# Patient Record
Sex: MALE | Race: WHITE | NOT HISPANIC OR LATINO | Employment: PART TIME | URBAN - METROPOLITAN AREA
[De-identification: names, ages, dates, MRNs, and addresses within clinical notes are randomized per-mention and may not be internally consistent; named-entity substitution may affect disease eponyms.]

---

## 2017-02-24 ENCOUNTER — OFFICE VISIT (OUTPATIENT)
Dept: RETAIL CLINIC | Facility: CLINIC | Age: 72
End: 2017-02-24

## 2017-02-24 DIAGNOSIS — Z02.83 ENCOUNTER FOR DRUG SCREENING: Primary | ICD-10-CM

## 2017-02-24 NOTE — PROGRESS NOTES
Donor presents to clinic for urine drug test. Photo ID and Epassport/Epassport ID number obtained. Consent form signed and dated.     Specimen collected per policy. See [facility] in scanned documents.     Jeri Neely, APRN

## 2017-04-26 ENCOUNTER — OFFICE VISIT (OUTPATIENT)
Dept: RETAIL CLINIC | Facility: CLINIC | Age: 72
End: 2017-04-26

## 2017-04-26 DIAGNOSIS — Z02.83 ENCOUNTER FOR DRUG SCREENING: Primary | ICD-10-CM

## 2017-04-26 NOTE — PROGRESS NOTES
Presents to clinic for urine drug screen. [facility] ID number and picture ID obtained from donor. Consent signed and dated. Urine drug screening performed per policy.       See scanned documents for Custody Control Form.

## 2017-06-16 ENCOUNTER — TRANSCRIBE ORDERS (OUTPATIENT)
Dept: ADMINISTRATIVE | Facility: HOSPITAL | Age: 72
End: 2017-06-16

## 2017-06-16 DIAGNOSIS — Z87.891 PERSONAL HISTORY OF TOBACCO USE, PRESENTING HAZARDS TO HEALTH: Primary | ICD-10-CM

## 2017-06-22 ENCOUNTER — HOSPITAL ENCOUNTER (OUTPATIENT)
Dept: ULTRASOUND IMAGING | Facility: HOSPITAL | Age: 72
Discharge: HOME | End: 2017-06-22
Admitting: NURSE PRACTITIONER

## 2017-06-22 DIAGNOSIS — Z87.891 PERSONAL HISTORY OF TOBACCO USE, PRESENTING HAZARDS TO HEALTH: ICD-10-CM

## 2017-06-22 PROCEDURE — 76706 US ABDL AORTA SCREEN AAA: CPT

## 2018-01-31 ENCOUNTER — TRANSCRIBE ORDERS (OUTPATIENT)
Dept: ADMINISTRATIVE | Facility: HOSPITAL | Age: 73
End: 2018-01-31

## 2018-01-31 ENCOUNTER — HOSPITAL ENCOUNTER (OUTPATIENT)
Dept: GENERAL RADIOLOGY | Facility: HOSPITAL | Age: 73
Discharge: HOME | End: 2018-01-31
Attending: INTERNAL MEDICINE | Admitting: INTERNAL MEDICINE

## 2018-01-31 DIAGNOSIS — M54.16 LUMBAR RADICULOPATHY: ICD-10-CM

## 2018-01-31 DIAGNOSIS — M54.16 LUMBAR RADICULOPATHY: Primary | ICD-10-CM

## 2018-01-31 PROCEDURE — 72110 X-RAY EXAM L-2 SPINE 4/>VWS: CPT

## 2018-03-09 ENCOUNTER — TRANSCRIBE ORDERS (OUTPATIENT)
Dept: ADMINISTRATIVE | Facility: HOSPITAL | Age: 73
End: 2018-03-09

## 2018-03-09 DIAGNOSIS — M54.16 LUMBAR RADICULOPATHY: Primary | ICD-10-CM

## 2018-03-15 ENCOUNTER — HOSPITAL ENCOUNTER (OUTPATIENT)
Dept: MRI IMAGING | Facility: HOSPITAL | Age: 73
Discharge: HOME | End: 2018-03-15
Attending: INTERNAL MEDICINE | Admitting: INTERNAL MEDICINE

## 2018-03-15 DIAGNOSIS — M54.16 LUMBAR RADICULOPATHY: ICD-10-CM

## 2018-03-15 PROCEDURE — 72148 MRI LUMBAR SPINE W/O DYE: CPT

## 2018-03-30 ENCOUNTER — OFFICE VISIT (OUTPATIENT)
Dept: NEUROSURGERY | Facility: CLINIC | Age: 73
End: 2018-03-30

## 2018-03-30 VITALS — WEIGHT: 190 LBS | HEIGHT: 66 IN | BODY MASS INDEX: 30.53 KG/M2 | TEMPERATURE: 97.3 F

## 2018-03-30 DIAGNOSIS — M43.06 LUMBAR SPONDYLOLYSIS: ICD-10-CM

## 2018-03-30 DIAGNOSIS — M48.061 SPINAL STENOSIS OF LUMBAR REGION WITHOUT NEUROGENIC CLAUDICATION: Primary | ICD-10-CM

## 2018-03-30 DIAGNOSIS — M51.36 DEGENERATIVE DISC DISEASE, LUMBAR: ICD-10-CM

## 2018-03-30 PROCEDURE — 99203 OFFICE O/P NEW LOW 30 MIN: CPT | Performed by: NEUROLOGICAL SURGERY

## 2018-03-30 NOTE — PROGRESS NOTES
Patient: Aj Marie  : 1945    Primary Care Provider: Asiya Gardner MD    Requesting Provider: As above        History    Chief Complaint: Low back and leg pain with sensory alteration.    History of Present Illness: Mr. Marie is a 72-year-old gentleman who works for [employer].  He's had some low-grade back difficulties in the past and was told that he had spinal stenosis some 15 years ago.  For 2 months he's had bothersome pain in his back that extended as a burning into his thighs.  His pain is actually better.  He's been doing some physical therapy.  His main complaint is an ongoing numbness in his feet and legs that may be worse by the end of the day.  He does some home exercises.  He gets some pain with protracted walking or standing but that doesn't seem to be particularly limiting.    Review of Systems   Constitutional: Negative for activity change, appetite change, chills, diaphoresis, fatigue, fever and unexpected weight change.   HENT: Positive for hearing loss. Negative for congestion, dental problem, drooling, ear discharge, ear pain, facial swelling, mouth sores, nosebleeds, postnasal drip, rhinorrhea, sinus pressure, sneezing, sore throat, tinnitus, trouble swallowing and voice change.    Eyes: Negative for photophobia, pain, discharge, redness, itching and visual disturbance.   Respiratory: Negative for apnea, cough, choking, chest tightness, shortness of breath, wheezing and stridor.    Cardiovascular: Negative for chest pain, palpitations and leg swelling.   Gastrointestinal: Negative for abdominal distention, abdominal pain, anal bleeding, blood in stool, constipation, diarrhea, nausea, rectal pain and vomiting.   Endocrine: Negative for cold intolerance, heat intolerance, polydipsia, polyphagia and polyuria.   Genitourinary: Negative for decreased urine volume, difficulty urinating, dysuria, enuresis, flank pain, frequency, genital sores, hematuria and urgency.  "  Musculoskeletal: Negative for arthralgias, back pain, gait problem, joint swelling, myalgias, neck pain and neck stiffness.   Skin: Negative for color change, pallor, rash and wound.   Allergic/Immunologic: Negative for environmental allergies, food allergies and immunocompromised state.   Neurological: Positive for numbness. Negative for dizziness, tremors, seizures, syncope, facial asymmetry, speech difficulty, weakness, light-headedness and headaches.   Hematological: Negative for adenopathy. Does not bruise/bleed easily.   Psychiatric/Behavioral: Negative for agitation, behavioral problems, confusion, decreased concentration, dysphoric mood, hallucinations, self-injury, sleep disturbance and suicidal ideas. The patient is not nervous/anxious and is not hyperactive.        The patient's past medical history, past surgical history, family history, and social history have been reviewed at length in the electronic medical record.    Physical Exam:   Temp 97.3 °F (36.3 °C) (Temporal Artery )   Ht 167.6 cm (66\")   Wt 86.2 kg (190 lb)   BMI 30.67 kg/m²   CONSTITUTIONAL: Patient is well-nourished, pleasant and appears stated age.  CV: Heart regular rate and rhythm without murmur, rub, or gallop.  PULMONARY: Lungs are clear to ascultation.  MUSCULOSKELETAL:  Straight leg raising is negative.  Ace's Sign is negative.  ROM in back is normal.  Tenderness in the back to palpation is not observed.  NEUROLOGICAL:  Orientation, memory, attention span, language function, and cognition have been examined and are intact.  Strength is intact in the lower extremities to direct testing.  Muscle tone is normal throughout.  Station and gait are normal.  Sensation is intact to light touch testing throughout.  Deep tendon reflexes are 2+ and symmetrical.  Herman signs are negative.  No clonus is elicited at his ankles.  Coordination is intact.      Medical Decision Making    Data Review:   MRI of the lumbar spine demonstrates " reversal of the normal lumbar lordosis with diffuse degenerative disc disease.  There is moderate stenosis at L3-4 and L4-5.    Diagnosis:   The patient has a mixed syndrome of neurogenic claudication as well as mechanical back pain.  His symptoms are modest at this point.    Treatment Options:   I've recommended ongoing home exercise and symptomatic treatment.  If his symptoms progress then I will be happy to see him in follow-up.  He is quite pleased that I have not recommended surgical intervention.       Diagnosis Plan   1. Spinal stenosis of lumbar region without neurogenic claudication     2. Degenerative disc disease, lumbar     3. Lumbar spondylolysis         Scribed for Spencer Ricketts MD by Falguni Holden CMA on 03/30/2018 at 12:55 PM      I, Dr. Ricketts, personally performed the services described in the documentation, as scribed in my presence, and it is both accurate and complete.

## 2018-10-30 ENCOUNTER — CONSULT (OUTPATIENT)
Dept: CARDIOLOGY | Facility: CLINIC | Age: 73
End: 2018-10-30

## 2018-10-30 VITALS
DIASTOLIC BLOOD PRESSURE: 82 MMHG | BODY MASS INDEX: 31.64 KG/M2 | HEART RATE: 63 BPM | WEIGHT: 201.6 LBS | HEIGHT: 67 IN | SYSTOLIC BLOOD PRESSURE: 128 MMHG

## 2018-10-30 DIAGNOSIS — I44.0 FIRST DEGREE ATRIOVENTRICULAR BLOCK: Chronic | ICD-10-CM

## 2018-10-30 DIAGNOSIS — I10 ESSENTIAL HYPERTENSION: ICD-10-CM

## 2018-10-30 DIAGNOSIS — R07.89 ATYPICAL CHEST PAIN: Primary | ICD-10-CM

## 2018-10-30 DIAGNOSIS — Z91.89 CANDIDATE FOR STATIN THERAPY DUE TO RISK OF FUTURE CARDIOVASCULAR EVENT: ICD-10-CM

## 2018-10-30 PROCEDURE — 93000 ELECTROCARDIOGRAM COMPLETE: CPT | Performed by: INTERNAL MEDICINE

## 2018-10-30 PROCEDURE — 99204 OFFICE O/P NEW MOD 45 MIN: CPT | Performed by: INTERNAL MEDICINE

## 2018-10-30 RX ORDER — TAMSULOSIN HYDROCHLORIDE 0.4 MG/1
CAPSULE ORAL DAILY
COMMUNITY
Start: 2018-09-27 | End: 2021-02-24

## 2018-10-30 NOTE — ASSESSMENT & PLAN NOTE
· Patient has recurrent atypical chest pain symptoms, some of which are pleuritic and chest pain and some of which are chest wall sounding.  · Given patient's cardiovascular risks, I recommend exercise echo for further risk stratification

## 2018-11-08 ENCOUNTER — TRANSCRIBE ORDERS (OUTPATIENT)
Dept: ADMINISTRATIVE | Facility: HOSPITAL | Age: 73
End: 2018-11-08

## 2018-11-08 ENCOUNTER — HOSPITAL ENCOUNTER (OUTPATIENT)
Dept: GENERAL RADIOLOGY | Facility: HOSPITAL | Age: 73
Discharge: HOME | End: 2018-11-08
Attending: INTERNAL MEDICINE | Admitting: INTERNAL MEDICINE

## 2018-11-08 DIAGNOSIS — R06.02 SHORTNESS OF BREATH: Primary | ICD-10-CM

## 2018-11-08 PROCEDURE — 71046 X-RAY EXAM CHEST 2 VIEWS: CPT

## 2018-11-29 ENCOUNTER — HOSPITAL ENCOUNTER (OUTPATIENT)
Dept: CARDIOLOGY | Facility: HOSPITAL | Age: 73
Discharge: HOME | End: 2018-11-29
Attending: INTERNAL MEDICINE | Admitting: INTERNAL MEDICINE

## 2018-11-29 VITALS
DIASTOLIC BLOOD PRESSURE: 78 MMHG | HEIGHT: 66 IN | WEIGHT: 189 LBS | HEART RATE: 70 BPM | BODY MASS INDEX: 30.37 KG/M2 | SYSTOLIC BLOOD PRESSURE: 164 MMHG

## 2018-11-29 DIAGNOSIS — R07.89 ATYPICAL CHEST PAIN: ICD-10-CM

## 2018-11-29 LAB
BH CV ECHO MEAS - AI DEC SLOPE: 120.9 CM/SEC^2
BH CV ECHO MEAS - AI MAX PG: 32.1 MMHG
BH CV ECHO MEAS - AI MAX VEL: 283.4 CM/SEC
BH CV ECHO MEAS - AI P1/2T: 686.8 MSEC
BH CV ECHO MEAS - AO ROOT AREA (BSA CORRECTED): 1.4
BH CV ECHO MEAS - AO ROOT AREA: 6.5 CM^2
BH CV ECHO MEAS - AO ROOT DIAM: 2.9 CM
BH CV ECHO MEAS - BSA(HAYCOCK): 2.1 M^2
BH CV ECHO MEAS - BSA: 2 M^2
BH CV ECHO MEAS - BZI_BMI: 32.4 KILOGRAMS/M^2
BH CV ECHO MEAS - BZI_METRIC_HEIGHT: 167.6 CM
BH CV ECHO MEAS - BZI_METRIC_WEIGHT: 91.2 KG
BH CV ECHO MEAS - EDV(CUBED): 51.2 ML
BH CV ECHO MEAS - EDV(TEICH): 58.6 ML
BH CV ECHO MEAS - EF(CUBED): 75.1 %
BH CV ECHO MEAS - EF(TEICH): 67.9 %
BH CV ECHO MEAS - ESV(CUBED): 12.7 ML
BH CV ECHO MEAS - ESV(TEICH): 18.8 ML
BH CV ECHO MEAS - FS: 37.1 %
BH CV ECHO MEAS - IVS/LVPW: 1
BH CV ECHO MEAS - IVSD: 1.2 CM
BH CV ECHO MEAS - LA DIMENSION: 3.1 CM
BH CV ECHO MEAS - LA/AO: 1.1
BH CV ECHO MEAS - LV MASS(C)D: 156.1 GRAMS
BH CV ECHO MEAS - LV MASS(C)DI: 77.9 GRAMS/M^2
BH CV ECHO MEAS - LVIDD: 3.7 CM
BH CV ECHO MEAS - LVIDS: 2.3 CM
BH CV ECHO MEAS - LVPWD: 1.2 CM
BH CV ECHO MEAS - MV A MAX VEL: 119 CM/SEC
BH CV ECHO MEAS - MV DEC SLOPE: 217.7 CM/SEC^2
BH CV ECHO MEAS - MV E MAX VEL: 59.2 CM/SEC
BH CV ECHO MEAS - MV E/A: 0.5
BH CV ECHO MEAS - MV P1/2T MAX VEL: 73.1 CM/SEC
BH CV ECHO MEAS - MV P1/2T: 98.4 MSEC
BH CV ECHO MEAS - MVA P1/2T LCG: 3 CM^2
BH CV ECHO MEAS - MVA(P1/2T): 2.2 CM^2
BH CV ECHO MEAS - SI(CUBED): 19.2 ML/M^2
BH CV ECHO MEAS - SI(TEICH): 19.9 ML/M^2
BH CV ECHO MEAS - SV(CUBED): 38.5 ML
BH CV ECHO MEAS - SV(TEICH): 39.8 ML
BH CV STRESS BP STAGE 1: NORMAL
BH CV STRESS BP STAGE 2: NORMAL
BH CV STRESS DURATION MIN STAGE 1: 3
BH CV STRESS DURATION MIN STAGE 2: 3
BH CV STRESS DURATION SEC STAGE 1: 0
BH CV STRESS DURATION SEC STAGE 2: 0
BH CV STRESS GRADE STAGE 1: 10
BH CV STRESS GRADE STAGE 2: 12
BH CV STRESS HR STAGE 2: 142
BH CV STRESS METS STAGE 1: 5
BH CV STRESS METS STAGE 2: 7.5
BH CV STRESS PROTOCOL 1: NORMAL
BH CV STRESS RECOVERY BP: NORMAL MMHG
BH CV STRESS RECOVERY HR: 89 BPM
BH CV STRESS SPEED STAGE 1: 1.7
BH CV STRESS SPEED STAGE 2: 2.5
BH CV STRESS STAGE 1: 1
BH CV STRESS STAGE 2: 2
LV EF 2D ECHO EST: 65 %
PERCENT MAX PREDICTED HR: 103.45 %
STRESS BASELINE BP: NORMAL MMHG
STRESS BASELINE HR: 70 BPM
STRESS PERCENT HR: 122 %
STRESS POST ESTIMATED WORKLOAD: 7 METS
STRESS POST EXERCISE DUR MIN: 6 MIN
STRESS POST EXERCISE DUR SEC: 0 SEC
STRESS POST PEAK BP: NORMAL MMHG
STRESS POST PEAK HR: 150 BPM

## 2018-11-29 PROCEDURE — 93350 STRESS TTE ONLY: CPT | Performed by: INTERNAL MEDICINE

## 2018-11-29 PROCEDURE — 93320 DOPPLER ECHO COMPLETE: CPT | Performed by: INTERNAL MEDICINE

## 2018-11-29 PROCEDURE — 93320 DOPPLER ECHO COMPLETE: CPT

## 2018-11-29 PROCEDURE — 25010000002 SULFUR HEXAFLUORIDE MICROSPH 60.7-25 MG RECONSTITUTED SUSPENSION: Performed by: INTERNAL MEDICINE

## 2018-11-29 PROCEDURE — 93017 CV STRESS TEST TRACING ONLY: CPT

## 2018-11-29 PROCEDURE — 93350 STRESS TTE ONLY: CPT

## 2018-11-29 PROCEDURE — 93325 DOPPLER ECHO COLOR FLOW MAPG: CPT

## 2018-11-29 PROCEDURE — 93352 ADMIN ECG CONTRAST AGENT: CPT | Performed by: INTERNAL MEDICINE

## 2018-11-29 PROCEDURE — 93325 DOPPLER ECHO COLOR FLOW MAPG: CPT | Performed by: INTERNAL MEDICINE

## 2018-11-29 PROCEDURE — 93018 CV STRESS TEST I&R ONLY: CPT | Performed by: INTERNAL MEDICINE

## 2018-11-29 RX ORDER — LISINOPRIL 5 MG/1
12.5 TABLET ORAL DAILY
COMMUNITY

## 2018-11-29 RX ADMIN — SULFUR HEXAFLUORIDE 5 ML: KIT at 14:00

## 2019-06-14 ENCOUNTER — TRANSCRIBE ORDERS (OUTPATIENT)
Dept: ADMINISTRATIVE | Facility: HOSPITAL | Age: 74
End: 2019-06-14

## 2019-06-14 DIAGNOSIS — R41.3 MEMORY CHANGE: Primary | ICD-10-CM

## 2019-06-20 ENCOUNTER — HOSPITAL ENCOUNTER (OUTPATIENT)
Dept: MRI IMAGING | Facility: HOSPITAL | Age: 74
Discharge: HOME | End: 2019-06-20
Admitting: INTERNAL MEDICINE

## 2019-06-20 DIAGNOSIS — R41.3 MEMORY CHANGE: ICD-10-CM

## 2019-06-20 PROCEDURE — 70551 MRI BRAIN STEM W/O DYE: CPT

## 2020-07-17 ENCOUNTER — OFFICE VISIT (OUTPATIENT)
Dept: NEUROLOGY | Facility: CLINIC | Age: 75
End: 2020-07-17

## 2020-07-17 VITALS
OXYGEN SATURATION: 98 % | DIASTOLIC BLOOD PRESSURE: 72 MMHG | TEMPERATURE: 97.3 F | HEART RATE: 56 BPM | WEIGHT: 188 LBS | HEIGHT: 67 IN | BODY MASS INDEX: 29.51 KG/M2 | SYSTOLIC BLOOD PRESSURE: 138 MMHG

## 2020-07-17 DIAGNOSIS — G31.84 MILD COGNITIVE IMPAIRMENT: Primary | ICD-10-CM

## 2020-07-17 PROCEDURE — 99204 OFFICE O/P NEW MOD 45 MIN: CPT | Performed by: PSYCHIATRY & NEUROLOGY

## 2020-07-17 RX ORDER — DONEPEZIL HYDROCHLORIDE 5 MG/1
5 TABLET, FILM COATED ORAL DAILY
Qty: 30 TABLET | Refills: 11 | Status: SHIPPED | OUTPATIENT
Start: 2020-07-17 | End: 2021-02-24

## 2020-07-17 NOTE — PROGRESS NOTES
"Subjective     CC: memory loss    History of Present Illness   Aj Marie is a 75 y.o. male who comes to clinic today for evaluation of memory impairment. His family has noted symptoms since about 2019 marked initially by mild forgetfulness. However, his symptoms worsened significantly after a Whipple's procedure for a pancreatic cyst at  in 11/19 marked by a significant delirium. Though he improved initially, he has had a progressive cognitive decline since that time marked by impairments in memory, along with some generally associated confusion and disorientation.  There are not identified modifying factors. There are not definite ADL impairments and he continues to work.    Prior evaluation has included a normal MRI of the brain and screening bloodwork.    I have reviewed and confirmed the past family, social and medical history as accurate on 7/17/20.     Review of Systems   Constitutional: Negative.    Respiratory: Negative.    Cardiovascular: Negative.    Gastrointestinal: Negative.    Genitourinary: Negative.    Musculoskeletal: Negative.    All other systems reviewed and are negative.      Objective   General appearance today is normal.   Peripheral pulses were present and symmetric.   The ophthalmoscopic exam today is unremarkable. The discs and posterior elements are unremarkable.    /72   Pulse 56   Temp 97.3 °F (36.3 °C)   Ht 168.9 cm (66.5\")   Wt 85.3 kg (188 lb)   SpO2 98%   BMI 29.89 kg/m²     Physical Exam   Neurological: He has normal strength. He has a normal Finger-Nose-Finger Test. Gait normal.   Reflex Scores:       Tricep reflexes are 2+ on the right side and 2+ on the left side.       Bicep reflexes are 2+ on the right side and 2+ on the left side.       Brachioradialis reflexes are 2+ on the right side and 2+ on the left side.  Psychiatric: His speech is normal.        Neurologic Exam     Mental Status   Oriented to person.   Oriented to place.   Disoriented to time. "   Registration: recalls 3 of 3 objects. Recall at 5 minutes: recalls 1 of 3 objects. Follows 3 step commands.   Attention: normal.   Speech: speech is normal   Level of consciousness: alert  Knowledge: poor.   Able to name object. Able to read. Able to repeat. Able to write. Normal comprehension.     Cranial Nerves   Cranial nerves II through XII intact.     Motor Exam   Muscle bulk: normal  Overall muscle tone: normal    Strength   Strength 5/5 throughout.     Sensory Exam   Light touch normal.     Gait, Coordination, and Reflexes     Gait  Gait: normal    Coordination   Finger to nose coordination: normal    Reflexes   Right brachioradialis: 2+  Left brachioradialis: 2+  Right biceps: 2+  Left biceps: 2+  Right triceps: 2+  Left triceps: 2+      MMSE=23      Assessment/Plan   Aj was seen today for memory loss.    Diagnoses and all orders for this visit:    Mild cognitive impairment    Other orders  -     donepezil (Aricept) 5 MG tablet; Take 1 tablet by mouth Daily.          DISCUSSION/SUMMARY    Aj Marie comes to clinic today for evaluation of memory impairment. His history and examination, including bedside cognitive testing are most consistent with Mild Cognitive Impairment (MCI) , which was discussed. His course is complicated by his history of delirium making prognostication difficult. I also discussed SLP referral. He will then follow up in 6 months , or sooner if needed.     As part of this visit I reviewed prior lab results, reviewed radiology results, reviewed radiology images and obtained additional history from the family which is incorporated in the HPI. Please see above for additional details.

## 2020-08-04 ENCOUNTER — TELEPHONE (OUTPATIENT)
Dept: NEUROLOGY | Facility: CLINIC | Age: 75
End: 2020-08-04

## 2020-08-04 DIAGNOSIS — G31.84 MILD COGNITIVE IMPAIRMENT: Primary | ICD-10-CM

## 2020-08-04 NOTE — TELEPHONE ENCOUNTER
PT'S WIFE ALPHONSO(RAMIRO) CALLING STATING THAT DR. COOK HAD MENTIONED ABOUT SPEECH THERAPY AT THE LAST APPT 7-17-20 AND PT IS WANTING TO DO IT.  PLEASE CALL HER BACK [phone]

## 2020-08-04 NOTE — TELEPHONE ENCOUNTER
Called and LVM informing pt wife Shwetha that order has been sent and once authorized, will receive a call from Central scheduling. Thanks.

## 2020-08-07 ENCOUNTER — TELEPHONE (OUTPATIENT)
Dept: NEUROLOGY | Facility: CLINIC | Age: 75
End: 2020-08-07

## 2020-08-07 NOTE — TELEPHONE ENCOUNTER
Phone call with wife who requested a call from  for education on his cognitive issues.  And Mrs. 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  Wife expressed a lot of stress in coping with his memory and thinking trouble. Says they both get very frustrated at his confusion, forgetting details, conversations or events (poor short term memory), she noted issues with executive functioning in that he couldn't problem solve through if he took some of his meds, took his night med in morning and didn't understand why he was taking Aricept, thinking it was for his dizziness. Wife is concerned about his driving cars, not that she's observed issues but concerned since memory has changed. She notices that he forgets he's already bathed and will shower twice in a day. She's noticed he is short tempered and irritable usually around noticing his memory issues. They keep a very structured day/week, both work, keep up with Je[employer] witness small groups via zoom, attend Je[employer] witness on sundays, lots of puzzles and games. They are interested in starting SLP/cog rehab which is in process of being scheduled, the techniques learned will be beneficial for them both. I provided her with self care techniques to help with her stress of coping with his memory issues, gave her support groups and educational program from the [employer]. She understood she can call me at any time.

## 2020-08-13 ENCOUNTER — HOSPITAL ENCOUNTER (OUTPATIENT)
Dept: SPEECH THERAPY | Facility: HOSPITAL | Age: 75
Setting detail: THERAPIES SERIES
Discharge: HOME | End: 2020-08-13

## 2020-08-13 DIAGNOSIS — G31.84 MILD COGNITIVE IMPAIRMENT: ICD-10-CM

## 2020-08-13 PROCEDURE — 92523 SPEECH SOUND LANG COMPREHEN: CPT

## 2020-08-13 NOTE — THERAPY EVALUATION
Outpatient Speech Language Pathology   Adult Speech Language Cognitive Initial Evaluation  [facility]     Patient Name: Aj Marie  : 1945  MRN: [id number]  Today's Date: 2020        Visit Date: 2020   Patient Active Problem List   Diagnosis   • First degree atrioventricular block   • OA (osteoarthritis)   • Hearing loss   • Essential hypertension   • Candidate for statin therapy due to risk of future cardiovascular event   • Atypical chest pain   • Mild cognitive impairment        Past Medical History:   Diagnosis Date   • Chest pain     Chest pain/dyspnea: Considering anginal equivalent and his risk factors, we will evaluate with a stress echocardiogram at his convenience and send a letter with the results and further recommendations.   • Dyslipidemia     2015:  Total cholesterol 186, triglycerides 147, HDL 37, .ASCVD 41.9%.   • First degree atrioventricular block     Chronicity unknown at this time, possibly first told 20 years ago.    • Hearing loss    • Hyperlipidemia    • Hypertension    • OA (osteoarthritis)         Past Surgical History:   Procedure Laterality Date   • HERNIA REPAIR     • OTHER SURGICAL HISTORY      Remote skull surgery.   • POLYPECTOMY           Visit Dx:    ICD-10-CM ICD-9-CM   1. Mild cognitive impairment G31.84 331.83           SLP [facility] - 20 1000        Communication Assessment/Intervention    Document Type  evaluation   -HG    Subjective Information  no complaints   -HG    Patient Observations  alert;cooperative;agree to therapy   -HG    Patient/Family Observations  Pt's wife present. Both acknowledge memory deficits.   -HG    Patient Effort  excellent   -HG    Symptoms Noted During/After Treatment  none   -HG       General Information    Patient Profile Reviewed  yes   -HG    Pertinent History Of Current Problem  Pt is a 75 year old male following by Neurologist for Mild Cognitive Impairment. Pt with a recent whipple procedue  in November of 2019 and pt states memory deficits more apparent following surgery.   -HG    Precautions/Limitations, Vision  WFL   -HG    Precautions/Limitations, Hearing  hearing impairment, bilaterally   -HG    Patient Level of Education  3 years college   -HG    Prior Level of Function-Communication  WFL   -HG    Plans/Goals Discussed with  patient;spouse/S.O.   -HG    Barriers to Rehab  none identified   -HG    Patient's Goals for Discharge  functional cognition   -HG    Family Goals for Discharge  functional cognition   -HG    Standardized Assessment Used  RBANS   -HG       Auditory Comprehension Assessment/Intervention    Auditory Comprehension (Communication)  WFL   -HG       Verbal Expression Assessment/Intervention    Verbal Expression  WFL   -HG       Oral Motor Structure and Function    Oral Motor Structure and Function  WFL   -HG       Oral Musculature and Cranial Nerve Assessment    Oral Motor General Assessment  WFL   -HG       Motor Speech Assessment/Intervention    Motor Speech Function  WFL   -HG       Cursory Voice Assessment/Intervention    Quality and Resonance (Voice)  WFL   -HG       Cognitive Assessment Intervention- SLP    Cognitive Function (Cognition)  moderate impairment   -HG    Orientation Status (Cognition)  WFL   -HG    Memory (Cognitive)  simple;immediate;moderate impairment;short-term;delayed;unrelated;severe impairment   -HG    Attention (Cognitive)  sustained;alternating;mild impairment;moderate impairment   -HG    Thought Organization (Cognitive)  concrete divergent;WFL   -HG    Reasoning (Cognitive)  simple;mod-complex;mild impairment   -HG    Problem Solving (Cognitive)  simple;divergent;multifactorial;moderate impairment   -HG       RBANS- Repeatable Battery for the Assessment of Neuropsychological Status    Immediate Memory Index Score  73   -HG    Immediate Memory Percentile  4 %   -HG    Immediate Memory Qualitative Description  borderline   -HG    Visuospatial Index Score   89   -HG    Visuospatial Percentile  23 %   -HG    Visuospatial Qualitative Description  low average   -HG    Language Index Score  96   -HG    Language Percentile  39 %   -HG    Language Qualitative Description  average   -HG    Attention Index Score  88   -HG    Attention Percentile  21 %   -HG    Attention Qualitative Description  low average   -HG    Delayed Memory Index Score  60   -HG    Delayed Memory Percentile  0 %   -HG    Delayed Memory Qualitative Description  extremely low   -HG    Total Index Score  406   -HG    Total Percentile  6 %   -HG    Total Qualitative Description  borderline   -HG      User Key  (r) = Recorded By, (t) = Taken By, (c) = Cosigned By    Initials Name Provider Type    Candy Bello MS CCC-SLP Speech and Language Pathologist                         OP SLP Education     Row Name 08/13/20 1000       Education    Barriers to Learning  No barriers identified  -HG    Education Provided  Family/caregivers demonstrated recommended strategies;Patient demonstrated recommended strategies;Family/caregivers require further education on strategies, risks;Patient requires further education on strategies, risks  -    Assessed  Learning needs;Learning motivation;Learning preferences;Learning readiness  -    Learning Motivation  Strong  -    Learning Method  Explanation;Demonstration;Teach back;Written materials  -    Teaching Response  Verbalized understanding;Demonstrated understanding;Reinforcement needed  -    Education Comments  Education initiated for internal and external memory strategies. Pt provided a handout and it was reviewed by SLP.   -HG      User Key  (r) = Recorded By, (t) = Taken By, (c) = Cosigned By    Initials Name Effective Dates    Candy Bello MS CCC-SLP 08/09/20 -           SLP OP Goals     Row Name 08/13/20 1000          Goal Type Needed    Goal Type Needed  Memory;Attention/Orientation;Other Adult Goals  -HG        Memory Goals    Patient will  be able to remember information needed to return to work and function on work-related tasks  90%:;with intermittent cues  -HG     Status: Patient will be able to remember information needed to return to work and function on work-related tasks  New  -HG     Patient will demonstrate improved ability to recall information by immediately recalling a series of words  80%:;related;after delay;with cues  -HG     Status: Patient will demonstrate improved ability to recall information by immediately recalling a series of words  New  -HG     Patient’s memory skills will be enhanced as reported by patient by utilizing internal memory strategies to recall up to 3 pieces of information after a 5- minute delay  80%:;with cues  -HG     Status: Patient’s memory skills will be enhanced as reported by patient by utilizing internal memory strategies to recall up to 3 pieces of information after a 5- minute delay  New  -HG     Patient’s memory skills will be enhanced as reported by patient by using external memory aides  80%:;with cues  -HG     Status: Patient’s memory skills will be enhanced as reported by patient by using external memory aides  New  -HG     Patient will demonstrate improved ability to recall information by stating activities patient has completed that day  80%:;with cues  -HG     Status: Patient will demonstrate improved ability to recall information by stating activities patient has completed that day  New  -HG        Attention/Orientation Goals    Patient will be able to execute all activities necessary to manage a home  Independently;With Cues  -HG     Status: Patient will be able to execute all activities necessary to manage a home  New  -HG     Patient will improve attention skills by sustaining consistent behavioral response during continuous and repetitive activity (e.g., listening for target words, auditory/reading comprehension tasks)  with cues;10 minute task  -HG     Status: Patient will improve attention  skills by sustaining consistent behavioral response during continuous and repetitive activity (e.g., listening for target words, auditory/reading comprehension tasks)  New  -HG     Patient will improve attention skills by sustaining focus in order to actively hold and manipulate information provided (e.g., sequencing auditorily presented number series in ascending or descending order)  80%:;with cues  -HG     Status: Patient will improve attention skills by sustaining focus in order to actively hold and manipulate information provided (e.g., sequencing auditorily presented number series in ascending or descending order)  New  -HG     Patient will improve attention skills by alternating or shifting focus between two different tasks in order to complete both tasks  80%:;with cues  -HG     Status: Patient will improve attention skills by alternating or shifting focus between two different tasks in order to complete both tasks  New  -HG     Patient will improve attention skills by dividing focus and responding simultaneously to multiple tasks or in order to complete task  80%:;with cues  -HG     Status: Patient will improve attention skills by dividing focus and responding simultaneously to multiple tasks or in order to complete task  New  -HG     Patient will improve attention skills by sustaining focus to high-level cognitive tasks in order to complete task  80%:;with cues  -HG     Status: Patient will improve attention skills by sustaining focus to high-level cognitive tasks in order to complete task  New  -HG        Other Goals    Other Adult Goal- 1  Pt will improve RBANS Total to at least an 85 placing pt in the Low Average range.   -HG     Status: Other Adult Goal- 1  New  -HG        SLP Time Calculation    SLP Goal Re-Cert Due Date  09/12/20  -HG       User Key  (r) = Recorded By, (t) = Taken By, (c) = Cosigned By    Initials Name Provider Type    Candy Bello MS CCC-SLP Speech and Language Pathologist           OP SLP Assessment/Plan - 08/13/20 1000        SLP Assessment    Functional Problems  Speech Language- Adult/Cognition   -HG    Impact on Function: Adult Speech Language/Cognition  Trouble learning or remembering new information;Requires supervision   -HG    Clinical Impression: Speech Language-Adult/Congnition  Moderate:;Cognitive Communication Impairment   -HG    Functional Problems Comment  Pt reports having the have information repeated to him over and over as well as 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    -HG    Clinical Impression Comments  RBANS Total Score of 77 places pt in the Borderline range.    -HG    Please refer to paper survey for additional self-reported information  Yes   -HG    Please refer to items scanned into chart for additional diagnostic informaiton and handouts as provided by clinician  Yes   -HG    SLP Diagnosis  Moderate Cognitive Communication impairment    -HG    Prognosis  Excellent (comment)   -HG    Patient/caregiver participated in establishment of treatment plan and goals  Yes   -HG    Patient would benefit from skilled therapy intervention  Yes   -HG       SLP Plan    Frequency  1-2x/week   -HG    Duration  12 weeks   -HG    Planned CPT's?  SLP SPEECH & LANGUAGE EVAL: 82280;SLP INDIVIDUAL SPEECH THERAPY: 29728   -HG    Expected Duration Therapy Session - minutes  45-60 minutes   -HG    Plan Comments  Initiate Cog tx.    -HG      User Key  (r) = Recorded By, (t) = Taken By, (c) = Cosigned By    Initials Name Provider Type     Candy Rubin, MS CCC-SLP Speech and Language Pathologist                 Time Calculation:   SLP Start Time: 1000    Therapy Charges for Today     Code Description Service Date Service Provider Modifiers Qty    [id number]  ST EVAL SPEECH AND PROD W LANG  4 8/13/2020 Candy Rubin MS CCC-SLP GN 1                   Candy Rubin MS CCC-SLP  8/13/2020

## 2020-08-27 ENCOUNTER — HOSPITAL ENCOUNTER (OUTPATIENT)
Dept: SPEECH THERAPY | Facility: HOSPITAL | Age: 75
Setting detail: THERAPIES SERIES
Discharge: HOME | End: 2020-08-27

## 2020-08-27 DIAGNOSIS — G31.84 MILD COGNITIVE IMPAIRMENT: Primary | ICD-10-CM

## 2020-08-27 PROCEDURE — 92507 TX SP LANG VOICE COMM INDIV: CPT

## 2020-08-27 NOTE — THERAPY TREATMENT NOTE
Outpatient Speech Language Pathology   Adult Speech Language Cognitive Treatment Note  [facility]     Patient Name: Aj Marie  : 1945  MRN: [id number]  Today's Date: 2020         Visit Date: 2020   Patient Active Problem List   Diagnosis   • First degree atrioventricular block   • OA (osteoarthritis)   • Hearing loss   • Essential hypertension   • Candidate for statin therapy due to risk of future cardiovascular event   • Atypical chest pain   • Mild cognitive impairment          Visit Dx:    ICD-10-CM ICD-9-CM   1. Mild cognitive impairment G31.84 331.83                         SLP OP Goals     Row Name 20 0900          Goal Type Needed    Goal Type Needed  Memory;Attention/Orientation;Other Adult Goals  -HG        Subjective Comments    Subjective Comments  Pt alert, cooperative, did not return with journal.   -HG        Memory Goals    Patient will be able to remember information needed to return to work and function on work-related tasks  90%:;with intermittent cues  -HG     Status: Patient will be able to remember information needed to return to work and function on work-related tasks  New  -HG     Patient will demonstrate improved ability to recall information by immediately recalling a series of words  80%:;related;after delay;with cues  -HG     Status: Patient will demonstrate improved ability to recall information by immediately recalling a series of words  Progressing as expected  -HG     Comments: Patient will demonstrate improved ability to recall information by immediately recalling a series of words  20: 4 related words and pt was 1/4 and improved to 3/4 and 4/4 with mneumonic device.   -HG     Patient’s memory skills will be enhanced as reported by patient by utilizing internal memory strategies to recall up to 3 pieces of information after a 5- minute delay  80%:;with cues  -HG     Status: Patient’s memory skills will be enhanced as reported by patient by  "utilizing internal memory strategies to recall up to 3 pieces of information after a 5- minute delay  New  -HG     Patient’s memory skills will be enhanced as reported by patient by using external memory aides  80%:;with cues  -HG     Status: Patient’s memory skills will be enhanced as reported by patient by using external memory aides  Progressing as expected  -HG     Comments: Patient’s memory skills will be enhanced as reported by patient by using external memory aides  8/27/20: Education for use of journal and the need for his delayed recall.   -HG     Patient will demonstrate improved ability to recall information by stating activities patient has completed that day  80%:;with cues  -HG     Status: Patient will demonstrate improved ability to recall information by stating activities patient has completed that day  Progressing as expected  -HG     Comments: Patient will demonstrate improved ability to recall information by stating activities patient has completed that day  8/27/20: Education for recall of what he has done throughout the day. Pt states that he never really worried about these things, \"That's why I had 2 secretaries.\"   -HG        Attention/Orientation Goals    Patient will be able to execute all activities necessary to manage a home  Independently;With Cues  -HG     Status: Patient will be able to execute all activities necessary to manage a home  New  -HG     Patient will improve attention skills by sustaining consistent behavioral response during continuous and repetitive activity (e.g., listening for target words, auditory/reading comprehension tasks)  with cues;10 minute task  -HG     Status: Patient will improve attention skills by sustaining consistent behavioral response during continuous and repetitive activity (e.g., listening for target words, auditory/reading comprehension tasks)  New  -HG     Patient will improve attention skills by sustaining focus in order to actively hold and " manipulate information provided (e.g., sequencing auditorily presented number series in ascending or descending order)  80%:;with cues  -HG     Status: Patient will improve attention skills by sustaining focus in order to actively hold and manipulate information provided (e.g., sequencing auditorily presented number series in ascending or descending order)  New  -HG     Patient will improve attention skills by alternating or shifting focus between two different tasks in order to complete both tasks  80%:;with cues  -HG     Status: Patient will improve attention skills by alternating or shifting focus between two different tasks in order to complete both tasks  New  -HG     Patient will improve attention skills by dividing focus and responding simultaneously to multiple tasks or in order to complete task  80%:;with cues  -HG     Status: Patient will improve attention skills by dividing focus and responding simultaneously to multiple tasks or in order to complete task  New  -HG     Patient will improve attention skills by sustaining focus to high-level cognitive tasks in order to complete task  80%:;with cues  -HG     Status: Patient will improve attention skills by sustaining focus to high-level cognitive tasks in order to complete task  Progressing as expected  -HG     Comments: Patient will improve attention skills by sustaining focus to high-level cognitive tasks in order to complete task  8/27/20: One Pile Card Game: Pt was 75% accurate.   -HG        Other Goals    Other Adult Goal- 1  Pt will improve RBANS Total to at least an 85 placing pt in the Low Average range.   -HG     Status: Other Adult Goal- 1  New  -HG        SLP Time Calculation    SLP Goal Re-Cert Due Date  09/12/20  -HG       User Key  (r) = Recorded By, (t) = Taken By, (c) = Cosigned By    Initials Name Provider Type    Candy Bello MS CCC-SLP Speech and Language Pathologist          OP SLP Education     Row Name 08/27/20 0900        Education    Education Comments  Education and written instructions for use of a daily journal or planner.   -      User Key  (r) = Recorded By, (t) = Taken By, (c) = Cosigned By    Initials Name Effective Dates    Candy Bello MS CCC-SLP 08/09/20 -           OP SLP Assessment/Plan - 08/27/20 0900        SLP Plan    Plan Comments  Cont with Cog tx.    -      User Key  (r) = Recorded By, (t) = Taken By, (c) = Cosigned By    Initials Name Provider Type    Candy Bello MS CCC-SLP Speech and Language Pathologist                 Time Calculation:   SLP Start Time: 0900    Therapy Charges for Today     Code Description Service Date Service Provider Modifiers Qty    28059301041 HC ST TREATMENT SPEECH 4 8/27/2020 Candy Rubin MS CCC-SLP GN 1                   Candy Rubin MS CCC-ROZINA  8/27/2020

## 2020-09-03 ENCOUNTER — HOSPITAL ENCOUNTER (OUTPATIENT)
Dept: SPEECH THERAPY | Facility: HOSPITAL | Age: 75
Setting detail: THERAPIES SERIES
Discharge: HOME | End: 2020-09-03

## 2020-09-03 DIAGNOSIS — G31.84 MILD COGNITIVE IMPAIRMENT: Primary | ICD-10-CM

## 2020-09-03 PROCEDURE — 92507 TX SP LANG VOICE COMM INDIV: CPT

## 2020-09-03 NOTE — THERAPY TREATMENT NOTE
Outpatient Speech Language Pathology   Adult Speech Language Cognitive Treatment Note  [facility]     Patient Name: Aj Marie  : 1945  MRN: [id number]  Today's Date: 9/3/2020         Visit Date: 2020   Patient Active Problem List   Diagnosis   • First degree atrioventricular block   • OA (osteoarthritis)   • Hearing loss   • Essential hypertension   • Candidate for statin therapy due to risk of future cardiovascular event   • Atypical chest pain   • Mild cognitive impairment          Visit Dx:    ICD-10-CM ICD-9-CM   1. Mild cognitive impairment G31.84 331.83                         SLP OP Goals     Row Name 20 0900          Goal Type Needed    Goal Type Needed  Memory;Attention/Orientation;Other Adult Goals  -HG        Subjective Comments    Subjective Comments  Pt alert, cooperative, returned with homework.   -HG        Memory Goals    Patient will be able to remember information needed to return to work and function on work-related tasks  90%:;with intermittent cues  -HG     Status: Patient will be able to remember information needed to return to work and function on work-related tasks  New  -HG     Patient will demonstrate improved ability to recall information by immediately recalling a series of words  80%:;related;after delay;with cues  -HG     Status: Patient will demonstrate improved ability to recall information by immediately recalling a series of words  Progressing as expected  -HG     Comments: Patient will demonstrate improved ability to recall information by immediately recalling a series of words  9/3/20: 4 related words from homework: pt was 2/4 I'ly. Moved to un-related pictures for visual ease and pt was 2/4. 20: 4 related words and pt was 1/4 and improved to 3/4 and 4/4 with mneumonic device.   -HG     Patient’s memory skills will be enhanced as reported by patient by utilizing internal memory strategies to recall up to 3 pieces of information after a 5- minute  "delay  80%:;with cues  -HG     Status: Patient’s memory skills will be enhanced as reported by patient by utilizing internal memory strategies to recall up to 3 pieces of information after a 5- minute delay  New  -HG     Patient’s memory skills will be enhanced as reported by patient by using external memory aides  80%:;with cues  -HG     Status: Patient’s memory skills will be enhanced as reported by patient by using external memory aides  Progressing as expected  -HG     Comments: Patient’s memory skills will be enhanced as reported by patient by using external memory aides  9/3/20: Pt states that he journaling \"a little\". 8/27/20: Education for use of journal and the need for his delayed recall.   -HG     Patient will demonstrate improved ability to recall information by stating activities patient has completed that day  80%:;with cues  -HG     Status: Patient will demonstrate improved ability to recall information by stating activities patient has completed that day  Progressing as expected  -HG     Comments: Patient will demonstrate improved ability to recall information by stating activities patient has completed that day  8/27/20: Education for recall of what he has done throughout the day. Pt states that he never really worried about these things, \"That's why I had 2 secretaries.\"   -HG        Attention/Orientation Goals    Patient will be able to execute all activities necessary to manage a home  Independently;With Cues  -HG     Status: Patient will be able to execute all activities necessary to manage a home  New  -HG     Patient will improve attention skills by sustaining consistent behavioral response during continuous and repetitive activity (e.g., listening for target words, auditory/reading comprehension tasks)  with cues;10 minute task  -HG     Status: Patient will improve attention skills by sustaining consistent behavioral response during continuous and repetitive activity (e.g., listening for " target words, auditory/reading comprehension tasks)  New  -HG     Patient will improve attention skills by sustaining focus in order to actively hold and manipulate information provided (e.g., sequencing auditorily presented number series in ascending or descending order)  80%:;with cues  -HG     Status: Patient will improve attention skills by sustaining focus in order to actively hold and manipulate information provided (e.g., sequencing auditorily presented number series in ascending or descending order)  Progressing as expected  -HG     Comments: Patient will improve attention skills by sustaining focus in order to actively hold and manipulate information provided (e.g., sequencing auditorily presented number series in ascending or descending order)  9/3/20: Digit Span: pt was able to recall up to 6 digits.   -HG     Patient will improve attention skills by alternating or shifting focus between two different tasks in order to complete both tasks  80%:;with cues  -HG     Status: Patient will improve attention skills by alternating or shifting focus between two different tasks in order to complete both tasks  Progressing as expected  -HG     Comments: Patient will improve attention skills by alternating or shifting focus between two different tasks in order to complete both tasks  9/3/20: Alternating attention for cards with N and sorting into suit and pt was 50% accurate.   -HG     Patient will improve attention skills by dividing focus and responding simultaneously to multiple tasks or in order to complete task  80%:;with cues  -HG     Status: Patient will improve attention skills by dividing focus and responding simultaneously to multiple tasks or in order to complete task  New  -HG     Patient will improve attention skills by sustaining focus to high-level cognitive tasks in order to complete task  80%:;with cues  -HG     Status: Patient will improve attention skills by sustaining focus to high-level cognitive  tasks in order to complete task  Progressing as expected  -HG     Comments: Patient will improve attention skills by sustaining focus to high-level cognitive tasks in order to complete task  8/27/20: One Pile Card Game: Pt was 75% accurate.   -HG        Other Goals    Other Adult Goal- 1  Pt will improve RBANS Total to at least an 85 placing pt in the Low Average range.   -HG     Status: Other Adult Goal- 1  New  -HG        SLP Time Calculation    SLP Goal Re-Cert Due Date  09/12/20  -HG       User Key  (r) = Recorded By, (t) = Taken By, (c) = Cosigned By    Initials Name Provider Type    Candy Bello MS CCC-SLP Speech and Language Pathologist          OP SLP Education     Row Name 09/03/20 0900       Education    Education Comments  Hmwk for delayed recall and journaling.   -HG      User Key  (r) = Recorded By, (t) = Taken By, (c) = Cosigned By    Initials Name Effective Dates    HG Candy Rubin MS CCC-SLP 08/09/20 -           OP SLP Assessment/Plan - 09/03/20 0900        SLP Plan    Plan Comments  Cont with Cog tx.    -HG      User Key  (r) = Recorded By, (t) = Taken By, (c) = Cosigned By    Initials Name Provider Type    Candy Bello MS CCC-SLP Speech and Language Pathologist                 Time Calculation:   SLP Start Time: 0900    Therapy Charges for Today     Code Description Service Date Service Provider Modifiers Qty    [id number]  ST TREATMENT SPEECH 4 9/3/2020 Candy Rubin MS CCC-SLP GN 1                   Candy Rubin MS CCC-SLP  9/3/2020

## 2020-09-10 ENCOUNTER — TELEPHONE (OUTPATIENT)
Dept: NEUROLOGY | Facility: CLINIC | Age: 75
End: 2020-09-10

## 2020-09-10 ENCOUNTER — HOSPITAL ENCOUNTER (OUTPATIENT)
Dept: SPEECH THERAPY | Facility: HOSPITAL | Age: 75
Setting detail: THERAPIES SERIES
Discharge: HOME | End: 2020-09-10

## 2020-09-10 DIAGNOSIS — G31.84 MILD COGNITIVE IMPAIRMENT: Primary | ICD-10-CM

## 2020-09-10 PROCEDURE — 92507 TX SP LANG VOICE COMM INDIV: CPT

## 2020-09-10 NOTE — TELEPHONE ENCOUNTER
Phone call with Mrs. Marie who wanted to discuss new/worsening cognitive concerns. She has noticed more forgetfulness, especially as it relates to the pandemic, not remembering mask, etc. He is still working for [employer] car and boss called her about concerns of needing more assistance and reminders while at work. He got lost while in [place] recently so they are limiting his driving to [place]. Wife feels he safe around town but will jump in car with him to reduce chance of him becoming disoriented. We discussed setting up his GPS, find my phone on iphone, and then we can discuss at next appt (or sooner if needed) [place] driving evaluation or the need to discuss giving up driving should more concerns arise. She asked about role of nutrition which we dicussed mediterranean and MIND diet, I offered to refer her to [facility] [place] Nutrition Education dept if needed. She will attend his next SLP session for more ideas on how to best help and communicate with him and we, too, will work with SLP on reassessing his cognitive abilities and related support needs. I again referred her to [employer] support groups.

## 2020-09-10 NOTE — THERAPY PROGRESS REPORT/RE-CERT
Outpatient Speech Language Pathology   Adult Speech Language Cognitive Progress Note  [facility]     Patient Name: Aj Marie  : 1945  MRN: [id number]  Today's Date: 9/10/2020         Visit Date: 09/10/2020   Patient Active Problem List   Diagnosis   • First degree atrioventricular block   • OA (osteoarthritis)   • Hearing loss   • Essential hypertension   • Candidate for statin therapy due to risk of future cardiovascular event   • Atypical chest pain   • Mild cognitive impairment          Visit Dx:    ICD-10-CM ICD-9-CM   1. Mild cognitive impairment G31.84 331.83                         SLP OP Goals     Row Name 09/10/20 0800          Goal Type Needed    Goal Type Needed  Memory;Attention/Orientation;Other Adult Goals  -HG        Subjective Comments    Subjective Comments  Pt alert, cooperative, returned with no notebook or homework.   -HG        Memory Goals    Patient will be able to remember information needed to return to work and function on work-related tasks  90%:;with intermittent cues  -HG     Status: Patient will be able to remember information needed to return to work and function on work-related tasks  New  -HG     Patient will demonstrate improved ability to recall information by immediately recalling a series of words  80%:;related;after delay;with cues  -HG     Status: Patient will demonstrate improved ability to recall information by immediately recalling a series of words  Progressing as expected  -HG     Comments: Patient will demonstrate improved ability to recall information by immediately recalling a series of words  :9/3/20: 4 related words from homework: pt was 2/4 I'ly. Moved to un-related pictures for visual ease and pt was 2/4. 20: 4 related words and pt was 1/4 and improved to 3/4 and 4/4 with mneumonic device.   -HG     Patient’s memory skills will be enhanced as reported by patient by utilizing internal memory strategies to recall up to 3 pieces of information  "after a 5- minute delay  80%:;with cues  -HG     Status: Patient’s memory skills will be enhanced as reported by patient by utilizing internal memory strategies to recall up to 3 pieces of information after a 5- minute delay  Progressing as expected  -HG     Comments: Patient’s memory skills will be enhanced as reported by patient by utilizing internal memory strategies to recall up to 3 pieces of information after a 5- minute delay  9/10/20: 4 un-related pictures from homework: pt was 3/4.  2/4- moved to 4 related pictures and pt was 0/4 I'ly. With cues, pt was 3/4.   -HG     Patient’s memory skills will be enhanced as reported by patient by using external memory aides  80%:;with cues  -HG     Status: Patient’s memory skills will be enhanced as reported by patient by using external memory aides  Progressing as expected  -HG     Comments: Patient’s memory skills will be enhanced as reported by patient by using external memory aides  9/10/20: Pt stated that he has written in his journal a little. Provided visual prompts to remind pt to write in his journal. 9/3/20: Pt states that he journaling \"a little\". 8/27/20: Education for use of journal and the need for his delayed recall.   -HG     Patient will demonstrate improved ability to recall information by stating activities patient has completed that day  80%:;with cues  -HG     Status: Patient will demonstrate improved ability to recall information by stating activities patient has completed that day  Progressing as expected  -HG     Comments: Patient will demonstrate improved ability to recall information by stating activities patient has completed that day  8/27/20: Education for recall of what he has done throughout the day. Pt states that he never really worried about these things, \"That's why I had 2 secretaries.\"   -HG        Attention/Orientation Goals    Patient will be able to execute all activities necessary to manage a home  Independently;With Cues  -HG  "    Status: Patient will be able to execute all activities necessary to manage a home  New  -HG     Patient will improve attention skills by sustaining consistent behavioral response during continuous and repetitive activity (e.g., listening for target words, auditory/reading comprehension tasks)  with cues;10 minute task  -HG     Status: Patient will improve attention skills by sustaining consistent behavioral response during continuous and repetitive activity (e.g., listening for target words, auditory/reading comprehension tasks)  Progressing as expected  -HG     Comments: Patient will improve attention skills by sustaining consistent behavioral response during continuous and repetitive activity (e.g., listening for target words, auditory/reading comprehension tasks)  9/10/20: Card ID for a card that is one less than one before it:   -HG     Patient will improve attention skills by sustaining focus in order to actively hold and manipulate information provided (e.g., sequencing auditorily presented number series in ascending or descending order)  80%:;with cues  -HG     Status: Patient will improve attention skills by sustaining focus in order to actively hold and manipulate information provided (e.g., sequencing auditorily presented number series in ascending or descending order)  Progressing as expected  -HG     Comments: Patient will improve attention skills by sustaining focus in order to actively hold and manipulate information provided (e.g., sequencing auditorily presented number series in ascending or descending order)  9/3/20: Digit Span: pt was able to recall up to 6 digits.   -HG     Patient will improve attention skills by alternating or shifting focus between two different tasks in order to complete both tasks  80%:;with cues  -HG     Status: Patient will improve attention skills by alternating or shifting focus between two different tasks in order to complete both tasks  Progressing as expected  -HG      Comments: Patient will improve attention skills by alternating or shifting focus between two different tasks in order to complete both tasks  9/3/20: Alternating attention for cards with N and sorting into suit and pt was 50% accurate.   -HG     Patient will improve attention skills by dividing focus and responding simultaneously to multiple tasks or in order to complete task  80%:;with cues  -HG     Status: Patient will improve attention skills by dividing focus and responding simultaneously to multiple tasks or in order to complete task  Progressing as expected  -HG     Comments: Patient will improve attention skills by dividing focus and responding simultaneously to multiple tasks or in order to complete task  9/10/20: Divided attention for card sorting according to suit and specific date: pt was 90% accurate.   -HG     Patient will improve attention skills by sustaining focus to high-level cognitive tasks in order to complete task  80%:;with cues  -HG     Status: Patient will improve attention skills by sustaining focus to high-level cognitive tasks in order to complete task  Progressing as expected  -HG     Comments: Patient will improve attention skills by sustaining focus to high-level cognitive tasks in order to complete task  8/27/20: One Pile Card Game: Pt was 75% accurate.   -HG        Other Goals    Other Adult Goal- 1  Pt will improve RBANS Total to at least an 85 placing pt in the Low Average range.   -HG     Status: Other Adult Goal- 1  Progressing as expected  -HG     Comments: Other Adult Goal- 1  9/10/20: RBANS not re-administered this date, pt given SLUMS and scored a 19/30.   -HG        SLP Time Calculation    SLP Goal Re-Cert Due Date  10/10/20  -HG       User Key  (r) = Recorded By, (t) = Taken By, (c) = Cosigned By    Initials Name Provider Type    Candy Bello MS CCC-SLP Speech and Language Pathologist          OP SLP Education     Row Name 09/10/20 0800       Education     Education Comments  Hmwk for delayed recall of 4 related pictures and to journal.   -HG      User Key  (r) = Recorded By, (t) = Taken By, (c) = Cosigned By    Initials Name Effective Dates    Candy Bello MS CCC-SLP 08/09/20 -           OP SLP Assessment/Plan - 09/10/20 0800        SLP Assessment    Functional Problems  Speech Language- Adult/Cognition   -HG    Clinical Impression Comments  RBANS not re-administered due to only 4 sessions in but SLUMS was given at a 19/30 falling in the dementia range.   -HG    Please refer to paper survey for additional self-reported information  Yes   -HG    Please refer to items scanned into chart for additional diagnostic informaiton and handouts as provided by clinician  Yes   -HG    SLP Diagnosis  Moderate cognitive impairment   -HG    Prognosis  Excellent (comment)   -HG       SLP Plan    Plan Comments  Cont with Cog tx.    -HG      User Key  (r) = Recorded By, (t) = Taken By, (c) = Cosigned By    Initials Name Provider Type    Candy Bello MS CCC-SLP Speech and Language Pathologist                 Time Calculation:   SLP Start Time: 0800    Therapy Charges for Today     Code Description Service Date Service Provider Modifiers Qty    [id number]  ST TREATMENT SPEECH 4 9/10/2020 Candy Rubin MS CCC-SLP GN 1                   Candy Rubin MS CCC-SLP  9/10/2020

## 2020-09-17 ENCOUNTER — HOSPITAL ENCOUNTER (OUTPATIENT)
Dept: SPEECH THERAPY | Facility: HOSPITAL | Age: 75
Setting detail: THERAPIES SERIES
Discharge: HOME | End: 2020-09-17

## 2020-09-17 DIAGNOSIS — G31.84 MILD COGNITIVE IMPAIRMENT: Primary | ICD-10-CM

## 2020-09-17 PROCEDURE — 92507 TX SP LANG VOICE COMM INDIV: CPT

## 2020-09-17 NOTE — THERAPY TREATMENT NOTE
Outpatient Speech Language Pathology   Adult Speech Language Cognitive Treatment Note  [facility]     Patient Name: Aj Marie  : 1945  MRN: [id number]  Today's Date: 2020         Visit Date: 2020   Patient Active Problem List   Diagnosis   • First degree atrioventricular block   • OA (osteoarthritis)   • Hearing loss   • Essential hypertension   • Candidate for statin therapy due to risk of future cardiovascular event   • Atypical chest pain   • Mild cognitive impairment          Visit Dx:    ICD-10-CM ICD-9-CM   1. Mild cognitive impairment  G31.84 331.83                         SLP OP Goals     Row Name 20 0800          Goal Type Needed    Goal Type Needed  Memory;Attention/Orientation;Other Adult Goals  -HG        Subjective Comments    Subjective Comments  Pt alert, cooperative, returned with journal.   -HG        Memory Goals    Patient will be able to remember information needed to return to work and function on work-related tasks  90%:;with intermittent cues  -HG     Status: Patient will be able to remember information needed to return to work and function on work-related tasks  New  -HG     Patient will demonstrate improved ability to recall information by immediately recalling a series of words  80%:;related;after delay;with cues  -HG     Status: Patient will demonstrate improved ability to recall information by immediately recalling a series of words  Progressing as expected  -HG     Comments: Patient will demonstrate improved ability to recall information by immediately recalling a series of words  20: Immediate recall of word placement for 4 words: pt was 100% accurate. Hmwk for 5 words.  9/3/20: 4 related words from homework: pt was 2/4 I'ly. Moved to un-related pictures for visual ease and pt was 2/4. 20: 4 related words and pt was 1/4 and improved to 3/4 and 4/4 with mneumonic device.   -HG     Patient’s memory skills will be enhanced as reported by  "patient by utilizing internal memory strategies to recall up to 3 pieces of information after a 5- minute delay  80%:;with cues  -HG     Status: Patient’s memory skills will be enhanced as reported by patient by utilizing internal memory strategies to recall up to 3 pieces of information after a 5- minute delay  Progressing as expected  -HG     Comments: Patient’s memory skills will be enhanced as reported by patient by utilizing internal memory strategies to recall up to 3 pieces of information after a 5- minute delay  9/17/20: 4 related words from homework and pt was 1/4- changed to 4 words that pertain to pt and he was 3/4.  9/10/20: 4 un-related pictures from homework: pt was 3/4.  2/4- moved to 4 related pictures and pt was 0/4 I'ly. With cues, pt was 3/4.   -HG     Patient’s memory skills will be enhanced as reported by patient by using external memory aides  80%:;with cues  -HG     Status: Patient’s memory skills will be enhanced as reported by patient by using external memory aides  Progressing as expected  -HG     Comments: Patient’s memory skills will be enhanced as reported by patient by using external memory aides  9/17/20: Pt writing in journal but not in great detail and not consistent- education ongoing.  9/10/20: Pt stated that he has written in his journal a little. Provided visual prompts to remind pt to write in his journal. 9/3/20: Pt states that he journaling \"a little\". 8/27/20: Education for use of journal and the need for his delayed recall.   -HG     Patient will demonstrate improved ability to recall information by stating activities patient has completed that day  80%:;with cues  -HG     Status: Patient will demonstrate improved ability to recall information by stating activities patient has completed that day  Progressing as expected  -HG     Comments: Patient will demonstrate improved ability to recall information by stating activities patient has completed that day  8/27/20: Education " "for recall of what he has done throughout the day. Pt states that he never really worried about these things, \"That's why I had 2 secretaries.\"   -HG        Attention/Orientation Goals    Patient will be able to execute all activities necessary to manage a home  Independently;With Cues  -HG     Status: Patient will be able to execute all activities necessary to manage a home  New  -HG     Patient will improve attention skills by sustaining consistent behavioral response during continuous and repetitive activity (e.g., listening for target words, auditory/reading comprehension tasks)  with cues;10 minute task  -HG     Status: Patient will improve attention skills by sustaining consistent behavioral response during continuous and repetitive activity (e.g., listening for target words, auditory/reading comprehension tasks)  Progressing as expected  -HG     Comments: Patient will improve attention skills by sustaining consistent behavioral response during continuous and repetitive activity (e.g., listening for target words, auditory/reading comprehension tasks)  9/10/20: Card ID for a card that is one less than one before it:   -HG     Patient will improve attention skills by sustaining focus in order to actively hold and manipulate information provided (e.g., sequencing auditorily presented number series in ascending or descending order)  80%:;with cues  -HG     Status: Patient will improve attention skills by sustaining focus in order to actively hold and manipulate information provided (e.g., sequencing auditorily presented number series in ascending or descending order)  Progressing as expected  -HG     Comments: Patient will improve attention skills by sustaining focus in order to actively hold and manipulate information provided (e.g., sequencing auditorily presented number series in ascending or descending order)  9/3/20: Digit Span: pt was able to recall up to 6 digits.   -HG     Patient will improve attention " skills by alternating or shifting focus between two different tasks in order to complete both tasks  80%:;with cues  -HG     Status: Patient will improve attention skills by alternating or shifting focus between two different tasks in order to complete both tasks  Progressing as expected  -HG     Comments: Patient will improve attention skills by alternating or shifting focus between two different tasks in order to complete both tasks  9/3/20: Alternating attention for cards with N and sorting into suit and pt was 50% accurate.   -HG     Patient will improve attention skills by dividing focus and responding simultaneously to multiple tasks or in order to complete task  80%:;with cues  -HG     Status: Patient will improve attention skills by dividing focus and responding simultaneously to multiple tasks or in order to complete task  Progressing as expected  -HG     Comments: Patient will improve attention skills by dividing focus and responding simultaneously to multiple tasks or in order to complete task  9/10/20: Divided attention for card sorting according to suit and specific date: pt was 90% accurate.   -HG     Patient will improve attention skills by sustaining focus to high-level cognitive tasks in order to complete task  80%:;with cues  -HG     Status: Patient will improve attention skills by sustaining focus to high-level cognitive tasks in order to complete task  Progressing as expected  -HG     Comments: Patient will improve attention skills by sustaining focus to high-level cognitive tasks in order to complete task  8/27/20: One Pile Card Game: Pt was 75% accurate.   -HG        Other Goals    Other Adult Goal- 1  Pt will improve RBANS Total to at least an 85 placing pt in the Low Average range.   -HG     Status: Other Adult Goal- 1  Progressing as expected  -HG     Comments: Other Adult Goal- 1  9/10/20: RBANS not re-administered this date, pt given SLUMS and scored a 19/30.   -HG        SLP Time  Calculation    SLP Goal Re-Cert Due Date  10/10/20  -       User Key  (r) = Recorded By, (t) = Taken By, (c) = Cosigned By    Initials Name Provider Type    Candy Bello MS CCC-SLP Speech and Language Pathologist          OP SLP Education     Row Name 09/17/20 0800       Education    Education Comments  Hmwk for delayed recall of 4 related words and journaling.   -      User Key  (r) = Recorded By, (t) = Taken By, (c) = Cosigned By    Initials Name Effective Dates    Candy Bello MS CCC-SLP 08/09/20 -           OP SLP Assessment/Plan - 09/17/20 0800        SLP Plan    Plan Comments  Cont with Cog tx.    -      User Key  (r) = Recorded By, (t) = Taken By, (c) = Cosigned By    Initials Name Provider Type    Candy Bello MS CCC-SLP Speech and Language Pathologist                 Time Calculation:   SLP Start Time: 0800    Therapy Charges for Today     Code Description Service Date Service Provider Modifiers Qty    [id number]  ST TREATMENT SPEECH 4 9/17/2020 Candy Rubin MS CCC-SLP GN 1                   Candy Rubin MS CCC-SLP  9/17/2020

## 2020-10-01 ENCOUNTER — HOSPITAL ENCOUNTER (OUTPATIENT)
Dept: SPEECH THERAPY | Facility: HOSPITAL | Age: 75
Setting detail: THERAPIES SERIES
Discharge: HOME | End: 2020-10-01

## 2020-10-01 DIAGNOSIS — G31.84 MILD COGNITIVE IMPAIRMENT: Primary | ICD-10-CM

## 2020-10-01 PROCEDURE — 92507 TX SP LANG VOICE COMM INDIV: CPT

## 2020-10-01 NOTE — THERAPY TREATMENT NOTE
Outpatient Speech Language Pathology   Adult Speech Language Cognitive Treatment Note  [facility]     Patient Name: Aj Marie  : 1945  MRN: [id number]  Today's Date: 10/1/2020         Visit Date: 10/01/2020   Patient Active Problem List   Diagnosis   • First degree atrioventricular block   • OA (osteoarthritis)   • Hearing loss   • Essential hypertension   • Candidate for statin therapy due to risk of future cardiovascular event   • Atypical chest pain   • Mild cognitive impairment          Visit Dx:    ICD-10-CM ICD-9-CM   1. Mild cognitive impairment  G31.84 331.83                         SLP OP Goals     Row Name 10/01/20 0800          Goal Type Needed    Goal Type Needed  Memory;Attention/Orientation;Other Adult Goals  -HG        Subjective Comments    Subjective Comments  Pt alert, cooperative, accompanied with wife.   -HG        Memory Goals    Patient will be able to remember information needed to return to work and function on work-related tasks  90%:;with intermittent cues  -HG     Status: Patient will be able to remember information needed to return to work and function on work-related tasks  New  -HG     Patient will demonstrate improved ability to recall information by immediately recalling a series of words  80%:;related;after delay;with cues  -HG     Status: Patient will demonstrate improved ability to recall information by immediately recalling a series of words  Progressing as expected  -HG     Comments: Patient will demonstrate improved ability to recall information by immediately recalling a series of words  10/1/20: IGORJOEL completed this date and pt did exhibit at least one significant error. 20: Immediate recall of word placement for 4 words: pt was 100% accurate. Hmwk for 5 words.  9/3/20: 4 related words from homework: pt was 2/4 I'ly. Moved to un-related pictures for visual ease and pt was 2/4. 20: 4 related words and pt was 1/4 and improved to 3/4 and 4/4 with  "mneumonic device.   -HG     Patient’s memory skills will be enhanced as reported by patient by utilizing internal memory strategies to recall up to 3 pieces of information after a 5- minute delay  80%:;with cues  -HG     Status: Patient’s memory skills will be enhanced as reported by patient by utilizing internal memory strategies to recall up to 3 pieces of information after a 5- minute delay  Progressing as expected  -HG     Comments: Patient’s memory skills will be enhanced as reported by patient by utilizing internal memory strategies to recall up to 3 pieces of information after a 5- minute delay  10/1/20: FROMAJE: Delayed recall also exhibited at least one significant error. 9/17/20: 4 related words from homework and pt was 1/4- changed to 4 words that pertain to pt and he was 3/4.  9/10/20: 4 un-related pictures from homework: pt was 3/4.  2/4- moved to 4 related pictures and pt was 0/4 I'ly. With cues, pt was 3/4.   -HG     Patient’s memory skills will be enhanced as reported by patient by using external memory aides  80%:;with cues  -HG     Status: Patient’s memory skills will be enhanced as reported by patient by using external memory aides  Progressing as expected  -HG     Comments: Patient’s memory skills will be enhanced as reported by patient by using external memory aides  10/1/20: Significant education with pt and wife regarding use of journal and/or notes lavon on phone. 9/17/20: Pt writing in journal but not in great detail and not consistent- education ongoing.  9/10/20: Pt stated that he has written in his journal a little. Provided visual prompts to remind pt to write in his journal. 9/3/20: Pt states that he journaling \"a little\". 8/27/20: Education for use of journal and the need for his delayed recall.   -HG     Patient will demonstrate improved ability to recall information by stating activities patient has completed that day  80%:;with cues  -HG     Status: Patient will demonstrate improved " "ability to recall information by stating activities patient has completed that day  Progressing as expected  -HG     Comments: Patient will demonstrate improved ability to recall information by stating activities patient has completed that day  10/1/20: Thorough education with pt and wife regarding use of journal. Pt admitted having to admit to his deficits. 8/27/20: Education for recall of what he has done throughout the day. Pt states that he never really worried about these things, \"That's why I had 2 secretaries.\"   -HG        Attention/Orientation Goals    Patient will be able to execute all activities necessary to manage a home  Independently;With Cues  -HG     Status: Patient will be able to execute all activities necessary to manage a home  Progressing as expected  -HG     Comments: Patient will be able to execute all activities necessary to manage a home  10/1/20: FROMDIANA for attention and pt exhibited fast processing with minimal errors.   -HG     Patient will improve attention skills by sustaining consistent behavioral response during continuous and repetitive activity (e.g., listening for target words, auditory/reading comprehension tasks)  with cues;10 minute task  -HG     Status: Patient will improve attention skills by sustaining consistent behavioral response during continuous and repetitive activity (e.g., listening for target words, auditory/reading comprehension tasks)  Progressing as expected  -HG     Comments: Patient will improve attention skills by sustaining consistent behavioral response during continuous and repetitive activity (e.g., listening for target words, auditory/reading comprehension tasks)  9/10/20: Card ID for a card that is one less than one before it:   -HG     Patient will improve attention skills by sustaining focus in order to actively hold and manipulate information provided (e.g., sequencing auditorily presented number series in ascending or descending order)  80%:;with " cues  -HG     Status: Patient will improve attention skills by sustaining focus in order to actively hold and manipulate information provided (e.g., sequencing auditorily presented number series in ascending or descending order)  Progressing as expected  -HG     Comments: Patient will improve attention skills by sustaining focus in order to actively hold and manipulate information provided (e.g., sequencing auditorily presented number series in ascending or descending order)  9/3/20: Digit Span: pt was able to recall up to 6 digits.   -HG     Patient will improve attention skills by alternating or shifting focus between two different tasks in order to complete both tasks  80%:;with cues  -HG     Status: Patient will improve attention skills by alternating or shifting focus between two different tasks in order to complete both tasks  Progressing as expected  -HG     Comments: Patient will improve attention skills by alternating or shifting focus between two different tasks in order to complete both tasks  9/3/20: Alternating attention for cards with N and sorting into suit and pt was 50% accurate.   -HG     Patient will improve attention skills by dividing focus and responding simultaneously to multiple tasks or in order to complete task  80%:;with cues  -HG     Status: Patient will improve attention skills by dividing focus and responding simultaneously to multiple tasks or in order to complete task  Progressing as expected  -HG     Comments: Patient will improve attention skills by dividing focus and responding simultaneously to multiple tasks or in order to complete task  9/10/20: Divided attention for card sorting according to suit and specific date: pt was 90% accurate.   -HG     Patient will improve attention skills by sustaining focus to high-level cognitive tasks in order to complete task  80%:;with cues  -HG     Status: Patient will improve attention skills by sustaining focus to high-level cognitive tasks  in order to complete task  Progressing as expected  -HG     Comments: Patient will improve attention skills by sustaining focus to high-level cognitive tasks in order to complete task  8/27/20: One Pile Card Game: Pt was 75% accurate.   -HG        Other Goals    Other Adult Goal- 1  Pt will improve RBANS Total to at least an 85 placing pt in the Low Average range.   -HG     Status: Other Adult Goal- 1  Progressing as expected  -HG     Comments: Other Adult Goal- 1  10/1/20: ROMIE: Pt score a 13- Stage 4.3. 9/10/20: RBANS not re-administered this date, pt given SLUMS and scored a 19/30.   -HG        SLP Time Calculation    SLP Goal Re-Cert Due Date  10/10/20  -HG       User Key  (r) = Recorded By, (t) = Taken By, (c) = Cosigned By    Initials Name Provider Type    Candy Bello MS CCC-SLP Speech and Language Pathologist          OP SLP Education     Row Name 10/01/20 0800       Education    Education Comments  Hmwk for delayed recall of 4 related pictures relative to pt.   -HG      User Key  (r) = Recorded By, (t) = Taken By, (c) = Cosigned By    Initials Name Effective Dates    Candy Bello MS CCC-SLP 08/09/20 -           OP SLP Assessment/Plan - 10/01/20 0800        SLP Assessment    Clinical Impression Comments  ROMIE score of 13- Stage 4.3   -HG       SLP Plan    Plan Comments  Cont with Cog tx.    -HG      User Key  (r) = Recorded By, (t) = Taken By, (c) = Cosigned By    Initials Name Provider Type    Candy Bello MS CCC-SLP Speech and Language Pathologist                 Time Calculation:   SLP Start Time: 0800    Therapy Charges for Today     Code Description Service Date Service Provider Modifiers Qty    34068348765 HC ST TREATMENT SPEECH 4 10/1/2020 Candy Rubin MS CCC-SLP GN 1                   Candy Rubin MS CCC-SLP  10/1/2020

## 2020-10-15 ENCOUNTER — HOSPITAL ENCOUNTER (OUTPATIENT)
Dept: SPEECH THERAPY | Facility: HOSPITAL | Age: 75
Setting detail: THERAPIES SERIES
Discharge: HOME | End: 2020-10-15

## 2020-10-15 DIAGNOSIS — G31.84 MILD COGNITIVE IMPAIRMENT: Primary | ICD-10-CM

## 2020-10-15 PROCEDURE — 92507 TX SP LANG VOICE COMM INDIV: CPT

## 2020-10-15 NOTE — THERAPY PROGRESS REPORT/RE-CERT
Outpatient Speech Language Pathology   Adult Speech Language Cognitive Progress Note  [facility]     Patient Name: Aj Marie  : 1945  MRN: [id number]  Today's Date: 10/15/2020         Visit Date: 10/15/2020   Patient Active Problem List   Diagnosis   • First degree atrioventricular block   • OA (osteoarthritis)   • Hearing loss   • Essential hypertension   • Candidate for statin therapy due to risk of future cardiovascular event   • Atypical chest pain   • Mild cognitive impairment          Visit Dx:    ICD-10-CM ICD-9-CM   1. Mild cognitive impairment  G31.84 331.83                         SLP OP Goals     Row Name 10/15/20 1300          Goal Type Needed    Goal Type Needed  Memory;Attention/Orientation;Other Adult Goals  -HG        Subjective Comments    Subjective Comments  Pt alert, cooperative, returned with homework.   -HG        Memory Goals    Patient will be able to remember information needed to return to work and function on work-related tasks  90%:;with intermittent cues  -HG     Status: Patient will be able to remember information needed to return to work and function on work-related tasks  New  -HG     Patient will demonstrate improved ability to recall information by immediately recalling a series of words  80%:;related;after delay;with cues  -HG     Status: Patient will demonstrate improved ability to recall information by immediately recalling a series of words  Progressing as expected  -HG     Comments: Patient will demonstrate improved ability to recall information by immediately recalling a series of words  10/15/20: Immediate recall of visuospatial Constructional task and pt was 80% accurate with min cues. 10/1/20: ROMIE completed this date and pt did exhibit at least one significant error. 20: Immediate recall of word placement for 4 words: pt was 100% accurate. Hmwk for 5 words.  9/3/20: 4 related words from homework: pt was 2/4 I'ly. Moved to un-related pictures for  "visual ease and pt was 2/4. 8/27/20: 4 related words and pt was 1/4 and improved to 3/4 and 4/4 with mneumonic device.   -HG     Patient’s memory skills will be enhanced as reported by patient by utilizing internal memory strategies to recall up to 3 pieces of information after a 5- minute delay  80%:;with cues  -HG     Status: Patient’s memory skills will be enhanced as reported by patient by utilizing internal memory strategies to recall up to 3 pieces of information after a 5- minute delay  Progressing as expected  -HG     Comments: Patient’s memory skills will be enhanced as reported by patient by utilizing internal memory strategies to recall up to 3 pieces of information after a 5- minute delay  10/15/20: Delayed recall of 4 related picture relative to pt and pt was 3/4 x 3. 10/1/20: FROMAJE: Delayed recall also exhibited at least one significant error. 9/17/20: 4 related words from homework and pt was 1/4- changed to 4 words that pertain to pt and he was 3/4.  9/10/20: 4 un-related pictures from homework: pt was 3/4.  2/4- moved to 4 related pictures and pt was 0/4 I'ly. With cues, pt was 3/4.   -HG     Patient’s memory skills will be enhanced as reported by patient by using external memory aides  80%:;with cues  -HG     Status: Patient’s memory skills will be enhanced as reported by patient by using external memory aides  Progressing as expected  -HG     Comments: Patient’s memory skills will be enhanced as reported by patient by using external memory aides  10/15/20: Pt reports writing a little- pt states that he doesn't write very well.  10/1/20: Significant education with pt and wife regarding use of journal and/or notes lavon on phone. 9/17/20: Pt writing in journal but not in great detail and not consistent- education ongoing.  9/10/20: Pt stated that he has written in his journal a little. Provided visual prompts to remind pt to write in his journal. 9/3/20: Pt states that he journaling \"a little\". " "8/27/20: Education for use of journal and the need for his delayed recall.   -HG     Patient will demonstrate improved ability to recall information by stating activities patient has completed that day  80%:;with cues  -HG     Status: Patient will demonstrate improved ability to recall information by stating activities patient has completed that day  Progressing as expected  -HG     Comments: Patient will demonstrate improved ability to recall information by stating activities patient has completed that day  10/1/20: Thorough education with pt and wife regarding use of journal. Pt admitted having to admit to his deficits. 8/27/20: Education for recall of what he has done throughout the day. Pt states that he never really worried about these things, \"That's why I had 2 secretaries.\"   -HG        Attention/Orientation Goals    Patient will be able to execute all activities necessary to manage a home  Independently;With Cues  -HG     Status: Patient will be able to execute all activities necessary to manage a home  Progressing as expected  -HG     Comments: Patient will be able to execute all activities necessary to manage a home  10/1/20: ROMIE for attention and pt exhibited fast processing with minimal errors.   -HG     Patient will improve attention skills by sustaining consistent behavioral response during continuous and repetitive activity (e.g., listening for target words, auditory/reading comprehension tasks)  with cues;10 minute task  -HG     Status: Patient will improve attention skills by sustaining consistent behavioral response during continuous and repetitive activity (e.g., listening for target words, auditory/reading comprehension tasks)  Progressing as expected  -HG     Comments: Patient will improve attention skills by sustaining consistent behavioral response during continuous and repetitive activity (e.g., listening for target words, auditory/reading comprehension tasks)  9/10/20: Card ID for a " card that is one less than one before it:   -HG     Patient will improve attention skills by sustaining focus in order to actively hold and manipulate information provided (e.g., sequencing auditorily presented number series in ascending or descending order)  80%:;with cues  -HG     Status: Patient will improve attention skills by sustaining focus in order to actively hold and manipulate information provided (e.g., sequencing auditorily presented number series in ascending or descending order)  Progressing as expected  -HG     Comments: Patient will improve attention skills by sustaining focus in order to actively hold and manipulate information provided (e.g., sequencing auditorily presented number series in ascending or descending order)  9/3/20: Digit Span: pt was able to recall up to 6 digits.   -HG     Patient will improve attention skills by alternating or shifting focus between two different tasks in order to complete both tasks  80%:;with cues  -HG     Status: Patient will improve attention skills by alternating or shifting focus between two different tasks in order to complete both tasks  Progressing as expected  -HG     Comments: Patient will improve attention skills by alternating or shifting focus between two different tasks in order to complete both tasks  9/3/20: Alternating attention for cards with N and sorting into suit and pt was 50% accurate.   -HG     Patient will improve attention skills by dividing focus and responding simultaneously to multiple tasks or in order to complete task  80%:;with cues  -HG     Status: Patient will improve attention skills by dividing focus and responding simultaneously to multiple tasks or in order to complete task  Progressing as expected  -HG     Comments: Patient will improve attention skills by dividing focus and responding simultaneously to multiple tasks or in order to complete task  9/10/20: Divided attention for card sorting according to suit and specific  date: pt was 90% accurate.   -HG     Patient will improve attention skills by sustaining focus to high-level cognitive tasks in order to complete task  80%:;with cues  -HG     Status: Patient will improve attention skills by sustaining focus to high-level cognitive tasks in order to complete task  Progressing as expected  -HG     Comments: Patient will improve attention skills by sustaining focus to high-level cognitive tasks in order to complete task  10/15/20: One Pile Card Game: reviewed before play: pt was 80%accurate. 8/27/20: One Pile Card Game: Pt was 75% accurate.   -HG        Other Goals    Other Adult Goal- 1  Pt will improve RBANS Total to at least an 85 placing pt in the Low Average range.   -HG     Status: Other Adult Goal- 1  Progressing as expected  -HG     Comments: Other Adult Goal- 1  10/1/20: FROMAJE: Pt score a 13- Stage 4.3. 9/10/20: RBANS not re-administered this date, pt given SLUMS and scored a 19/30.   -HG        SLP Time Calculation    SLP Goal Re-Cert Due Date  11/09/20  -HG       User Key  (r) = Recorded By, (t) = Taken By, (c) = Cosigned By    Initials Name Provider Type    Candy Bello MS CCC-SLP Speech and Language Pathologist          OP SLP Education     Row Name 10/15/20 1300       Education    Education Comments  Hmwk for 5 related pictures.   -HG      User Key  (r) = Recorded By, (t) = Taken By, (c) = Cosigned By    Initials Name Effective Dates    HG Candy Rubin MS CCC-SLP 08/09/20 -           OP SLP Assessment/Plan - 10/15/20 1300        SLP Assessment    Functional Problems  Speech Language- Adult/Cognition   -HG    Impact on Function: Adult Speech Language/Cognition  Trouble learning or remembering new information;Poor attention to task   -HG    Clinical Impression: Speech Language-Adult/Congnition  Moderate:;Cognitive Communication Impairment   -HG    Functional Problems Comment  Pt reports that the content doesn't change the level of difficulty   -HG     Clinical Impression Comments  FROMAJE score of 13- Stage 4.3   -HG    Please refer to paper survey for additional self-reported information  Yes   -HG    Please refer to items scanned into chart for additional diagnostic informaiton and handouts as provided by clinician  Yes   -HG    SLP Diagnosis  Moderate cognitive communication impairment   -HG    Prognosis  Excellent (comment)   -HG    Patient/caregiver participated in establishment of treatment plan and goals  Yes   -HG    Patient would benefit from skilled therapy intervention  Yes   -HG       SLP Plan    Frequency  1-2x/week   -HG    Duration  8 weeks   -HG    Planned CPT's?  SLP INDIVIDUAL SPEECH THERAPY: 23483   -    Plan Comments  Cont with Cog tx.    -HG      User Key  (r) = Recorded By, (t) = Taken By, (c) = Cosigned By    Initials Name Provider Type     Candy Rubin MS CCC-SLP Speech and Language Pathologist                 Time Calculation:   SLP Start Time: 1300    Therapy Charges for Today     Code Description Service Date Service Provider Modifiers Qty    91736089189 HC ST TREATMENT SPEECH 5 10/15/2020 Candy Rbuin MS CCC-SLP GN 1                   Candy Rubin MS CCC-SLP  10/15/2020

## 2020-10-22 ENCOUNTER — HOSPITAL ENCOUNTER (OUTPATIENT)
Dept: SPEECH THERAPY | Facility: HOSPITAL | Age: 75
Setting detail: THERAPIES SERIES
Discharge: HOME | End: 2020-10-22

## 2020-10-22 DIAGNOSIS — G31.84 MILD COGNITIVE IMPAIRMENT: Primary | ICD-10-CM

## 2020-10-22 PROCEDURE — 92507 TX SP LANG VOICE COMM INDIV: CPT

## 2020-10-22 NOTE — THERAPY TREATMENT NOTE
Outpatient Speech Language Pathology   Adult Speech Language Cognitive Treatment Note  [facility]     Patient Name: Aj Marie  : 1945  MRN: [id number]  Today's Date: 10/22/2020         Visit Date: 10/22/2020   Patient Active Problem List   Diagnosis   • First degree atrioventricular block   • OA (osteoarthritis)   • Hearing loss   • Essential hypertension   • Candidate for statin therapy due to risk of future cardiovascular event   • Atypical chest pain   • Mild cognitive impairment          Visit Dx:    ICD-10-CM ICD-9-CM   1. Mild cognitive impairment  G31.84 331.83                         SLP OP Goals     Row Name 10/22/20 0900          Goal Type Needed    Goal Type Needed  Memory;Attention/Orientation;Other Adult Goals  -HG        Subjective Comments    Subjective Comments  Pt alert, cooperative, returned with no journal or homework.   -HG        Memory Goals    Patient will be able to remember information needed to return to work and function on work-related tasks  90%:;with intermittent cues  -HG     Status: Patient will be able to remember information needed to return to work and function on work-related tasks  New  -HG     Patient will demonstrate improved ability to recall information by immediately recalling a series of words  80%:;related;after delay;with cues  -HG     Status: Patient will demonstrate improved ability to recall information by immediately recalling a series of words  Progressing as expected  -HG     Comments: Patient will demonstrate improved ability to recall information by immediately recalling a series of words  10/15/20: Immediate recall of visuospatial Constructional task and pt was 80% accurate with min cues. 10/1/20: ROMIE completed this date and pt did exhibit at least one significant error. 20: Immediate recall of word placement for 4 words: pt was 100% accurate. Hmwk for 5 words.  9/3/20: 4 related words from homework: pt was 2/4 I'ly. Moved to  un-related pictures for visual ease and pt was 2/4. 8/27/20: 4 related words and pt was 1/4 and improved to 3/4 and 4/4 with mneumonic device.   -HG     Patient’s memory skills will be enhanced as reported by patient by utilizing internal memory strategies to recall up to 3 pieces of information after a 5- minute delay  80%:;with cues  -HG     Status: Patient’s memory skills will be enhanced as reported by patient by utilizing internal memory strategies to recall up to 3 pieces of information after a 5- minute delay  Progressing as expected  -HG     Comments: Patient’s memory skills will be enhanced as reported by patient by utilizing internal memory strategies to recall up to 3 pieces of information after a 5- minute delay  10/22/20: Delayed recall of 5 related pictures and pt was 5/5, 4/5, 5/5 x 2.  Added a sixth picture and pt was 5/6 . 10/15/20: Delayed recall of 4 related picture relative to pt and pt was 3/4 x 3. 10/1/20: FROMAJE: Delayed recall also exhibited at least one significant error. 9/17/20: 4 related words from homework and pt was 1/4- changed to 4 words that pertain to pt and he was 3/4.  9/10/20: 4 un-related pictures from homework: pt was 3/4.  2/4- moved to 4 related pictures and pt was 0/4 I'ly. With cues, pt was 3/4.   -HG     Patient’s memory skills will be enhanced as reported by patient by using external memory aides  80%:;with cues  -HG     Status: Patient’s memory skills will be enhanced as reported by patient by using external memory aides  Progressing as expected  -HG     Comments: Patient’s memory skills will be enhanced as reported by patient by using external memory aides  10/15/20: Pt reports writing a little- pt states that he doesn't write very well.  10/1/20: Significant education with pt and wife regarding use of journal and/or notes lavon on phone. 9/17/20: Pt writing in journal but not in great detail and not consistent- education ongoing.  9/10/20: Pt stated that he has written  "in his journal a little. Provided visual prompts to remind pt to write in his journal. 9/3/20: Pt states that he journaling \"a little\". 8/27/20: Education for use of journal and the need for his delayed recall.   -HG     Patient will demonstrate improved ability to recall information by stating activities patient has completed that day  80%:;with cues  -HG     Status: Patient will demonstrate improved ability to recall information by stating activities patient has completed that day  Progressing as expected  -HG     Comments: Patient will demonstrate improved ability to recall information by stating activities patient has completed that day  10/22/20: When asked about his weekend, he was able to provide general information, not specifics of places or things seen. 10/1/20: Thorough education with pt and wife regarding use of journal. Pt admitted having to admit to his deficits. 8/27/20: Education for recall of what he has done throughout the day. Pt states that he never really worried about these things, \"That's why I had 2 secretaries.\"   -HG        Attention/Orientation Goals    Patient will be able to execute all activities necessary to manage a home  Independently;With Cues  -HG     Status: Patient will be able to execute all activities necessary to manage a home  Progressing as expected  -HG     Comments: Patient will be able to execute all activities necessary to manage a home  10/1/20: ROMIE for attention and pt exhibited fast processing with minimal errors.   -HG     Patient will improve attention skills by sustaining consistent behavioral response during continuous and repetitive activity (e.g., listening for target words, auditory/reading comprehension tasks)  with cues;10 minute task  -HG     Status: Patient will improve attention skills by sustaining consistent behavioral response during continuous and repetitive activity (e.g., listening for target words, auditory/reading comprehension tasks)  " Progressing as expected  -HG     Comments: Patient will improve attention skills by sustaining consistent behavioral response during continuous and repetitive activity (e.g., listening for target words, auditory/reading comprehension tasks)  10/22/20: Recognition of cards presented quickly: pt was nearly 100% accurate. 9/10/20: Card ID for a card that is one less than one before it:   -HG     Patient will improve attention skills by sustaining focus in order to actively hold and manipulate information provided (e.g., sequencing auditorily presented number series in ascending or descending order)  80%:;with cues  -HG     Status: Patient will improve attention skills by sustaining focus in order to actively hold and manipulate information provided (e.g., sequencing auditorily presented number series in ascending or descending order)  Progressing as expected  -HG     Comments: Patient will improve attention skills by sustaining focus in order to actively hold and manipulate information provided (e.g., sequencing auditorily presented number series in ascending or descending order)  10/22/20: Digit Span: pt continues to be successful with 6 digits. 7 digits: 6/7. 9/3/20: Digit Span: pt was able to recall up to 6 digits.   -HG     Patient will improve attention skills by alternating or shifting focus between two different tasks in order to complete both tasks  80%:;with cues  -HG     Status: Patient will improve attention skills by alternating or shifting focus between two different tasks in order to complete both tasks  Progressing as expected  -HG     Comments: Patient will improve attention skills by alternating or shifting focus between two different tasks in order to complete both tasks  10/22/20: Alternating attention for cards with N and into their suit and pt was 80% accurate. 9/3/20: Alternating attention for cards with N and sorting into suit and pt was 50% accurate.   -HG     Patient will improve attention  skills by dividing focus and responding simultaneously to multiple tasks or in order to complete task  80%:;with cues  -HG     Status: Patient will improve attention skills by dividing focus and responding simultaneously to multiple tasks or in order to complete task  Progressing as expected  -HG     Comments: Patient will improve attention skills by dividing focus and responding simultaneously to multiple tasks or in order to complete task  9/10/20: Divided attention for card sorting according to suit and specific date: pt was 90% accurate.   -HG     Patient will improve attention skills by sustaining focus to high-level cognitive tasks in order to complete task  80%:;with cues  -HG     Status: Patient will improve attention skills by sustaining focus to high-level cognitive tasks in order to complete task  Progressing as expected  -HG     Comments: Patient will improve attention skills by sustaining focus to high-level cognitive tasks in order to complete task  10/15/20: One Pile Card Game: reviewed before play: pt was 80%accurate. 8/27/20: One Pile Card Game: Pt was 75% accurate.   -HG        Other Goals    Other Adult Goal- 1  Pt will improve RBANS Total to at least an 85 placing pt in the Low Average range.   -HG     Status: Other Adult Goal- 1  Progressing as expected  -HG     Comments: Other Adult Goal- 1  10/1/20: FROMAJE: Pt score a 13- Stage 4.3. 9/10/20: RBANS not re-administered this date, pt given SLUMS and scored a 19/30.   -HG     Other Adult Goal- 2  Pt will complete high level reasoning tasks and problem solving with at least 80% accuracy.  -HG     Status: Other Adult Goal- 2  New;Progressing as expected  -HG     Comments: Other Adult Goal- 2  10/22/20: Deduction puzzle: pt was 70% accurate.    -HG        SLP Time Calculation    SLP Goal Re-Cert Due Date  11/09/20  -HG       User Key  (r) = Recorded By, (t) = Taken By, (c) = Cosigned By    Initials Name Provider Type    Candy Bello MS  CCC-SLP Speech and Language Pathologist          OP SLP Education     Row Name 10/22/20 0900       Education    Education Comments  Hmwk for 6 related pictures.   -      User Key  (r) = Recorded By, (t) = Taken By, (c) = Cosigned By    Initials Name Effective Dates    Candy Bello MS CCC-SLP 08/09/20 -           OP SLP Assessment/Plan - 10/22/20 0900        SLP Plan    Plan Comments  Cont with Cog tx.    -      User Key  (r) = Recorded By, (t) = Taken By, (c) = Cosigned By    Initials Name Provider Type    Candy Bello MS CCC-SLP Speech and Language Pathologist                 Time Calculation:   SLP Start Time: 0900    Therapy Charges for Today     Code Description Service Date Service Provider Modifiers Qty    91646457843 HC ST TREATMENT SPEECH 4 10/22/2020 Candy Rubin MS CCC-SLP GN 1                   Candy Rubin MS CCC-SLP  10/22/2020

## 2020-10-29 ENCOUNTER — HOSPITAL ENCOUNTER (OUTPATIENT)
Dept: SPEECH THERAPY | Facility: HOSPITAL | Age: 75
Setting detail: THERAPIES SERIES
Discharge: HOME | End: 2020-10-29

## 2020-10-29 DIAGNOSIS — G31.84 MILD COGNITIVE IMPAIRMENT: Primary | ICD-10-CM

## 2020-10-29 PROCEDURE — 92507 TX SP LANG VOICE COMM INDIV: CPT

## 2020-10-29 NOTE — THERAPY TREATMENT NOTE
Outpatient Speech Language Pathology   Adult Speech Language Cognitive Treatment Note  [facility]     Patient Name: Aj Marie  : 1945  MRN: [id number]  Today's Date: 10/29/2020         Visit Date: 10/29/2020   Patient Active Problem List   Diagnosis   • First degree atrioventricular block   • OA (osteoarthritis)   • Hearing loss   • Essential hypertension   • Candidate for statin therapy due to risk of future cardiovascular event   • Atypical chest pain   • Mild cognitive impairment          Visit Dx:    ICD-10-CM ICD-9-CM   1. Mild cognitive impairment  G31.84 331.83                         SLP OP Goals     Row Name 10/29/20 0900          Goal Type Needed    Goal Type Needed  Memory;Attention/Orientation;Other Adult Goals  -HG        Subjective Comments    Subjective Comments  Pt alert, cooperative, returned with no journal.   -HG        Memory Goals    Patient will be able to remember information needed to return to work and function on work-related tasks  90%:;with intermittent cues  -HG     Status: Patient will be able to remember information needed to return to work and function on work-related tasks  New  -HG     Patient will demonstrate improved ability to recall information by immediately recalling a series of words  80%:;related;after delay;with cues  -HG     Status: Patient will demonstrate improved ability to recall information by immediately recalling a series of words  Progressing as expected  -HG     Comments: Patient will demonstrate improved ability to recall information by immediately recalling a series of words  10/15/20: Immediate recall of visuospatial Constructional task and pt was 80% accurate with min cues. 10/1/20: ROMIE completed this date and pt did exhibit at least one significant error. 20: Immediate recall of word placement for 4 words: pt was 100% accurate. Hmwk for 5 words.  9/3/20: 4 related words from homework: pt was 2/4 I'ly. Moved to un-related pictures  for visual ease and pt was 2/4. 8/27/20: 4 related words and pt was 1/4 and improved to 3/4 and 4/4 with mneumonic device.   -HG     Patient’s memory skills will be enhanced as reported by patient by utilizing internal memory strategies to recall up to 3 pieces of information after a 5- minute delay  80%:;with cues  -HG     Status: Patient’s memory skills will be enhanced as reported by patient by utilizing internal memory strategies to recall up to 3 pieces of information after a 5- minute delay  Progressing as expected  -HG     Comments: Patient’s memory skills will be enhanced as reported by patient by utilizing internal memory strategies to recall up to 3 pieces of information after a 5- minute delay  10/29/20: Delayed recall of 6 related pictures: pt was 4/6. With use of first letter initials, pt was 6/6 accurate. 7th word added: pt was 7/7 x 3.   10/22/20: Delayed recall of 5 related pictures and pt was 5/5, 4/5, 5/5 x 2.  Added a sixth picture and pt was 5/6 . 10/15/20: Delayed recall of 4 related picture relative to pt and pt was 3/4 x 3. 10/1/20: FROMAJE: Delayed recall also exhibited at least one significant error. 9/17/20: 4 related words from homework and pt was 1/4- changed to 4 words that pertain to pt and he was 3/4.  9/10/20: 4 un-related pictures from homework: pt was 3/4.  2/4- moved to 4 related pictures and pt was 0/4 I'ly. With cues, pt was 3/4.   -HG     Patient’s memory skills will be enhanced as reported by patient by using external memory aides  80%:;with cues  -HG     Status: Patient’s memory skills will be enhanced as reported by patient by using external memory aides  Progressing as expected  -HG     Comments: Patient’s memory skills will be enhanced as reported by patient by using external memory aides  10/29/20: cont'd education for use of daily journal. Provided a visual prompt.   10/15/20: Pt reports writing a little- pt states that he doesn't write very well.  10/1/20: Significant  "education with pt and wife regarding use of journal and/or notes lavon on phone. 9/17/20: Pt writing in journal but not in great detail and not consistent- education ongoing.  9/10/20: Pt stated that he has written in his journal a little. Provided visual prompts to remind pt to write in his journal. 9/3/20: Pt states that he journaling \"a little\". 8/27/20: Education for use of journal and the need for his delayed recall.   -HG     Patient will demonstrate improved ability to recall information by stating activities patient has completed that day  80%:;with cues  -HG     Status: Patient will demonstrate improved ability to recall information by stating activities patient has completed that day  Progressing as expected  -HG     Comments: Patient will demonstrate improved ability to recall information by stating activities patient has completed that day  10/22/20: When asked about his weekend, he was able to provide general information, not specifics of places or things seen. 10/1/20: Thorough education with pt and wife regarding use of journal. Pt admitted having to admit to his deficits. 8/27/20: Education for recall of what he has done throughout the day. Pt states that he never really worried about these things, \"That's why I had 2 secretaries.\"   -HG        Attention/Orientation Goals    Patient will be able to execute all activities necessary to manage a home  Independently;With Cues  -HG     Status: Patient will be able to execute all activities necessary to manage a home  Progressing as expected  -HG     Comments: Patient will be able to execute all activities necessary to manage a home  10/1/20: ROMIE for attention and pt exhibited fast processing with minimal errors.   -HG     Patient will improve attention skills by sustaining consistent behavioral response during continuous and repetitive activity (e.g., listening for target words, auditory/reading comprehension tasks)  with cues;10 minute task  -HG     " Status: Patient will improve attention skills by sustaining consistent behavioral response during continuous and repetitive activity (e.g., listening for target words, auditory/reading comprehension tasks)  Progressing as expected  -HG     Comments: Patient will improve attention skills by sustaining consistent behavioral response during continuous and repetitive activity (e.g., listening for target words, auditory/reading comprehension tasks)  10/22/20: Recognition of cards presented quickly: pt was nearly 100% accurate. 9/10/20: Card ID for a card that is one less than one before it:   -HG     Patient will improve attention skills by sustaining focus in order to actively hold and manipulate information provided (e.g., sequencing auditorily presented number series in ascending or descending order)  80%:;with cues  -HG     Status: Patient will improve attention skills by sustaining focus in order to actively hold and manipulate information provided (e.g., sequencing auditorily presented number series in ascending or descending order)  Progressing as expected  -HG     Comments: Patient will improve attention skills by sustaining focus in order to actively hold and manipulate information provided (e.g., sequencing auditorily presented number series in ascending or descending order)  10/22/20: Digit Span: pt continues to be successful with 6 digits. 7 digits: 6/7. 9/3/20: Digit Span: pt was able to recall up to 6 digits.   -HG     Patient will improve attention skills by alternating or shifting focus between two different tasks in order to complete both tasks  80%:;with cues  -HG     Status: Patient will improve attention skills by alternating or shifting focus between two different tasks in order to complete both tasks  Progressing as expected  -HG     Comments: Patient will improve attention skills by alternating or shifting focus between two different tasks in order to complete both tasks  10/22/20: Alternating  attention for cards with N and into their suit and pt was 80% accurate. 9/3/20: Alternating attention for cards with N and sorting into suit and pt was 50% accurate.   -HG     Patient will improve attention skills by dividing focus and responding simultaneously to multiple tasks or in order to complete task  80%:;with cues  -HG     Status: Patient will improve attention skills by dividing focus and responding simultaneously to multiple tasks or in order to complete task  Progressing as expected  -HG     Comments: Patient will improve attention skills by dividing focus and responding simultaneously to multiple tasks or in order to complete task  10/29/20: Card sorting based on order per suit and high to low. 9/10/20: Divided attention for card sorting according to suit and specific date: pt was 90% accurate.   -HG     Patient will improve attention skills by sustaining focus to high-level cognitive tasks in order to complete task  80%:;with cues  -HG     Status: Patient will improve attention skills by sustaining focus to high-level cognitive tasks in order to complete task  Progressing as expected  -HG     Comments: Patient will improve attention skills by sustaining focus to high-level cognitive tasks in order to complete task  10/15/20: One Pile Card Game: reviewed before play: pt was 80%accurate. 8/27/20: One Pile Card Game: Pt was 75% accurate.   -HG        Other Goals    Other Adult Goal- 1  Pt will improve RBANS Total to at least an 85 placing pt in the Low Average range.   -HG     Status: Other Adult Goal- 1  Progressing as expected  -HG     Comments: Other Adult Goal- 1  10/1/20: FROMAJE: Pt score a 13- Stage 4.3. 9/10/20: RBANS not re-administered this date, pt given SLUMS and scored a 19/30.   -HG     Other Adult Goal- 2  Pt will complete high level reasoning tasks and problem solving with at least 80% accuracy.  -HG     Status: Other Adult Goal- 2  New;Progressing as expected  -HG     Comments: Other  Adult Goal- 2  10/22/20: Deduction puzzle: pt was 70% accurate.    -        SLP Time Calculation    SLP Goal Re-Cert Due Date  11/09/20  -HG       User Key  (r) = Recorded By, (t) = Taken By, (c) = Cosigned By    Initials Name Provider Type    Candy Bello MS CCC-SLP Speech and Language Pathologist          OP SLP Education     Row Name 10/29/20 0900       Education    Education Comments  Hwmk for 7 related pictures.  -      User Key  (r) = Recorded By, (t) = Taken By, (c) = Cosigned By    Initials Name Effective Dates    Candy Bello MS CCC-SLP 08/09/20 -           OP SLP Assessment/Plan - 10/29/20 0900        SLP Plan    Plan Comments  Cont with Cog tx with plans to re-assess at next session.    -      User Key  (r) = Recorded By, (t) = Taken By, (c) = Cosigned By    Initials Name Provider Type    Candy Bello MS CCC-SLP Speech and Language Pathologist                 Time Calculation:   SLP Start Time: 1000    Therapy Charges for Today     Code Description Service Date Service Provider Modifiers Qty    [id number] HC ST TREATMENT SPEECH 4 10/29/2020 Candy Rubin MS CCC-SLP GN 1                   Candy Rubin MS CCC-SLP  10/29/2020

## 2020-11-05 ENCOUNTER — HOSPITAL ENCOUNTER (OUTPATIENT)
Dept: SPEECH THERAPY | Facility: HOSPITAL | Age: 75
Setting detail: THERAPIES SERIES
Discharge: HOME | End: 2020-11-05

## 2020-11-05 DIAGNOSIS — G31.84 MILD COGNITIVE IMPAIRMENT: Primary | ICD-10-CM

## 2020-11-05 PROCEDURE — 92507 TX SP LANG VOICE COMM INDIV: CPT

## 2020-11-05 NOTE — THERAPY PROGRESS REPORT/RE-CERT
Outpatient Speech Language Pathology   Adult Speech Language Cognitive Progress Note  [facility]     Patient Name: Aj Marie  : 1945  MRN: [id number]  Today's Date: 2020         Visit Date: 2020   Patient Active Problem List   Diagnosis   • First degree atrioventricular block   • OA (osteoarthritis)   • Hearing loss   • Essential hypertension   • Candidate for statin therapy due to risk of future cardiovascular event   • Atypical chest pain   • Mild cognitive impairment          Visit Dx:    ICD-10-CM ICD-9-CM   1. Mild cognitive impairment  G31.84 331.83                         SLP OP Goals     Row Name 20 0900          Goal Type Needed    Goal Type Needed  Memory;Attention/Orientation;Other Adult Goals  -HG        Subjective Comments    Subjective Comments  Pt alert, cooperative. SLP had a lengthy conversation with wife via phone before session to address current function.  SLP to address   -HG        Memory Goals    Patient will be able to remember information needed to return to work and function on work-related tasks  90%:;with intermittent cues  -HG     Status: Patient will be able to remember information needed to return to work and function on work-related tasks  New  -HG     Patient will demonstrate improved ability to recall information by immediately recalling a series of words  80%:;related;after delay;with cues  -HG     Status: Patient will demonstrate improved ability to recall information by immediately recalling a series of words  Progressing as expected  -HG     Comments: Patient will demonstrate improved ability to recall information by immediately recalling a series of words  20: RBANS Immediate recall score: Pt improved to an 81 from a 73. 10/15/20: Immediate recall of visuospatial Constructional task and pt was 80% accurate with min cues. 10/1/20: ROMIE completed this date and pt did exhibit at least one significant error. 20: Immediate recall of  word placement for 4 words: pt was 100% accurate. Hmwk for 5 words.  9/3/20: 4 related words from homework: pt was 2/4 I'ly. Moved to un-related pictures for visual ease and pt was 2/4. 8/27/20: 4 related words and pt was 1/4 and improved to 3/4 and 4/4 with mneumonic device.   -HG     Patient’s memory skills will be enhanced as reported by patient by utilizing internal memory strategies to recall up to 3 pieces of information after a 5- minute delay  80%:;with cues  -HG     Status: Patient’s memory skills will be enhanced as reported by patient by utilizing internal memory strategies to recall up to 3 pieces of information after a 5- minute delay  Progressing as expected  -HG     Comments: Patient’s memory skills will be enhanced as reported by patient by utilizing internal memory strategies to recall up to 3 pieces of information after a 5- minute delay  11/5/20: RBANS Delayed recall: pt was improved to a 79 from a 60.  10/29/20: Delayed recall of 6 related pictures: pt was 4/6. With use of first letter initials, pt was 6/6 accurate. 7th word added: pt was 7/7 x 3.   10/22/20: Delayed recall of 5 related pictures and pt was 5/5, 4/5, 5/5 x 2.  Added a sixth picture and pt was 5/6 . 10/15/20: Delayed recall of 4 related picture relative to pt and pt was 3/4 x 3. 10/1/20: FROMAJE: Delayed recall also exhibited at least one significant error. 9/17/20: 4 related words from homework and pt was 1/4- changed to 4 words that pertain to pt and he was 3/4.  9/10/20: 4 un-related pictures from homework: pt was 3/4.  2/4- moved to 4 related pictures and pt was 0/4 I'ly. With cues, pt was 3/4.   -HG     Patient’s memory skills will be enhanced as reported by patient by using external memory aides  80%:;with cues  -HG     Status: Patient’s memory skills will be enhanced as reported by patient by using external memory aides  Progressing as expected  -HG     Comments: Patient’s memory skills will be enhanced as reported by  "patient by using external memory aides  11/5/20:Education and expectation of using his journal daily and returning to therapy with it next week. 10/29/20: cont'd education for use of daily journal. Provided a visual prompt.   10/15/20: Pt reports writing a little- pt states that he doesn't write very well.  10/1/20: Significant education with pt and wife regarding use of journal and/or notes lavon on phone. 9/17/20: Pt writing in journal but not in great detail and not consistent- education ongoing.  9/10/20: Pt stated that he has written in his journal a little. Provided visual prompts to remind pt to write in his journal. 9/3/20: Pt states that he journaling \"a little\". 8/27/20: Education for use of journal and the need for his delayed recall.   -HG     Patient will demonstrate improved ability to recall information by stating activities patient has completed that day  80%:;with cues  -HG     Status: Patient will demonstrate improved ability to recall information by stating activities patient has completed that day  Progressing as expected  -HG     Comments: Patient will demonstrate improved ability to recall information by stating activities patient has completed that day  10/22/20: When asked about his weekend, he was able to provide general information, not specifics of places or things seen. 10/1/20: Thorough education with pt and wife regarding use of journal. Pt admitted having to admit to his deficits. 8/27/20: Education for recall of what he has done throughout the day. Pt states that he never really worried about these things, \"That's why I had 2 secretaries.\"   -HG        Attention/Orientation Goals    Patient will be able to execute all activities necessary to manage a home  Independently;With Cues  -HG     Status: Patient will be able to execute all activities necessary to manage a home  Progressing as expected  -HG     Comments: Patient will be able to execute all activities necessary to manage a home  " 11/5/20: RBANS Attention Score: Improved to a 103 from an 88. 10/1/20: FROMAJE for attention and pt exhibited fast processing with minimal errors.   -HG     Patient will improve attention skills by sustaining consistent behavioral response during continuous and repetitive activity (e.g., listening for target words, auditory/reading comprehension tasks)  with cues;10 minute task  -HG     Status: Patient will improve attention skills by sustaining consistent behavioral response during continuous and repetitive activity (e.g., listening for target words, auditory/reading comprehension tasks)  Progressing as expected  -HG     Comments: Patient will improve attention skills by sustaining consistent behavioral response during continuous and repetitive activity (e.g., listening for target words, auditory/reading comprehension tasks)  10/22/20: Recognition of cards presented quickly: pt was nearly 100% accurate. 9/10/20: Card ID for a card that is one less than one before it:   -HG     Patient will improve attention skills by sustaining focus in order to actively hold and manipulate information provided (e.g., sequencing auditorily presented number series in ascending or descending order)  80%:;with cues  -HG     Status: Patient will improve attention skills by sustaining focus in order to actively hold and manipulate information provided (e.g., sequencing auditorily presented number series in ascending or descending order)  Progressing as expected  -HG     Comments: Patient will improve attention skills by sustaining focus in order to actively hold and manipulate information provided (e.g., sequencing auditorily presented number series in ascending or descending order)  10/22/20: Digit Span: pt continues to be successful with 6 digits. 7 digits: 6/7. 9/3/20: Digit Span: pt was able to recall up to 6 digits.   -HG     Patient will improve attention skills by alternating or shifting focus between two different tasks in order  to complete both tasks  80%:;with cues  -HG     Status: Patient will improve attention skills by alternating or shifting focus between two different tasks in order to complete both tasks  Progressing as expected  -HG     Comments: Patient will improve attention skills by alternating or shifting focus between two different tasks in order to complete both tasks  10/22/20: Alternating attention for cards with N and into their suit and pt was 80% accurate. 9/3/20: Alternating attention for cards with N and sorting into suit and pt was 50% accurate.   -HG     Patient will improve attention skills by dividing focus and responding simultaneously to multiple tasks or in order to complete task  80%:;with cues  -HG     Status: Patient will improve attention skills by dividing focus and responding simultaneously to multiple tasks or in order to complete task  Progressing as expected  -HG     Comments: Patient will improve attention skills by dividing focus and responding simultaneously to multiple tasks or in order to complete task  10/29/20: Card sorting based on order per suit and high to low. 9/10/20: Divided attention for card sorting according to suit and specific date: pt was 90% accurate.   -HG     Patient will improve attention skills by sustaining focus to high-level cognitive tasks in order to complete task  80%:;with cues  -HG     Status: Patient will improve attention skills by sustaining focus to high-level cognitive tasks in order to complete task  Progressing as expected  -HG     Comments: Patient will improve attention skills by sustaining focus to high-level cognitive tasks in order to complete task  10/15/20: One Pile Card Game: reviewed before play: pt was 80%accurate. 8/27/20: One Pile Card Game: Pt was 75% accurate.   -HG        Other Goals    Other Adult Goal- 1  Pt will improve RBANS Total to at least a 90 placing pt in the  Average range.   -HG     Status: Other Adult Goal- 1  Progressing as  expected;Revised  -HG     Comments: Other Adult Goal- 1  11/5/30: RBANS Total of 82 improved from a 77. 10/1/20: FROMAJE: Pt score a 13- Stage 4.3. 9/10/20: RBANS not re-administered this date, pt given SLUMS and scored a 19/30.   -HG     Other Adult Goal- 2  Pt will complete high level reasoning tasks and problem solving with at least 80% accuracy.  -HG     Status: Other Adult Goal- 2  New;Progressing as expected  -HG     Comments: Other Adult Goal- 2  10/22/20: Deduction puzzle: pt was 70% accurate.    -HG        SLP Time Calculation    SLP Goal Re-Cert Due Date  12/05/20  -HG       User Key  (r) = Recorded By, (t) = Taken By, (c) = Cosigned By    Initials Name Provider Type    ALYSON CanisCandy MS CCC-SLP Speech and Language Pathologist          OP SLP Education     Row Name 11/05/20 0900       Education    Education Comments  Hmwk for 8 related pictures. Education and importance of daily journal.   -HG      User Key  (r) = Recorded By, (t) = Taken By, (c) = Cosigned By    Initials Name Effective Dates    ALYSON CarynCandy MS CCC-SLP 08/09/20 -           OP SLP Assessment/Plan - 11/05/20 0900        SLP Assessment    Functional Problems  Speech Language- Adult/Cognition   -HG    Impact on Function: Adult Speech Language/Cognition  Trouble learning or remembering new information   -HG    Clinical Impression: Speech Language-Adult/Congnition  Mild-Moderate:;Cognitive Communication Impairment   -HG    Functional Problems Comment  Pt reports need for written cues at times.   -HG    Clinical Impression Comments  RBANS Re-assessment score of 82 is improved from a 77.   -HG    Please refer to paper survey for additional self-reported information  Yes   -HG    Please refer to items scanned into chart for additional diagnostic informaiton and handouts as provided by clinician  Yes   -HG    SLP Diagnosis  Mild to Moderate cognitive communication impairment   -HG    Prognosis  Excellent (comment)   -HG     Patient/caregiver participated in establishment of treatment plan and goals  Yes   -HG    Patient would benefit from skilled therapy intervention  Yes   -HG       SLP Plan    Frequency  1-2x/week   -HG    Duration  8 weeks   -HG    Planned CPT's?  SLP INDIVIDUAL SPEECH THERAPY: 38649   -HG    Expected Duration of Therapy Session (SLP Eval)  60   -HG    Plan Comments  Cont with Cog tx.    -HG      User Key  (r) = Recorded By, (t) = Taken By, (c) = Cosigned By    Initials Name Provider Type     Candy Rubin MS CCC-SLP Speech and Language Pathologist                 Time Calculation:   SLP Start Time: 0900    Therapy Charges for Today     Code Description Service Date Service Provider Modifiers Qty    [id number]  ST TREATMENT SPEECH 5 11/5/2020 Candy Rubin MS CCC-SLP GN 1                   Candy Rubin MS CCC-SLP  11/5/2020

## 2020-11-12 ENCOUNTER — HOSPITAL ENCOUNTER (OUTPATIENT)
Dept: SPEECH THERAPY | Facility: HOSPITAL | Age: 75
Setting detail: THERAPIES SERIES
Discharge: HOME | End: 2020-11-12

## 2020-11-12 DIAGNOSIS — G31.84 MILD COGNITIVE IMPAIRMENT: Primary | ICD-10-CM

## 2020-11-12 PROCEDURE — 92507 TX SP LANG VOICE COMM INDIV: CPT

## 2020-11-12 NOTE — THERAPY TREATMENT NOTE
Outpatient Speech Language Pathology   Adult Speech Language Cognitive Treatment Note  [facility]     Patient Name: Aj Marie  : 1945  MRN: [id number]  Today's Date: 2020         Visit Date: 2020   Patient Active Problem List   Diagnosis   • First degree atrioventricular block   • OA (osteoarthritis)   • Hearing loss   • Essential hypertension   • Candidate for statin therapy due to risk of future cardiovascular event   • Atypical chest pain   • Mild cognitive impairment          Visit Dx:    ICD-10-CM ICD-9-CM   1. Mild cognitive impairment  G31.84 331.83                         SLP OP Goals     Row Name 20 0900          Goal Type Needed    Goal Type Needed  Memory;Attention/Orientation;Other Adult Goals  -HG        Subjective Comments    Subjective Comments  Pt alert, cooperative.   -HG        Memory Goals    Patient will be able to remember information needed to return to work and function on work-related tasks  90%:;with intermittent cues  -HG     Status: Patient will be able to remember information needed to return to work and function on work-related tasks  New  -HG     Patient will demonstrate improved ability to recall information by immediately recalling a series of words  80%:;related;after delay;with cues  -HG     Status: Patient will demonstrate improved ability to recall information by immediately recalling a series of words  Progressing as expected  -HG     Comments: Patient will demonstrate improved ability to recall information by immediately recalling a series of words  20: Immediate recall of a picture scene and pt was 70% accurate with min cues. 20: RBANS Immediate recall score: Pt improved to an 81 from a 73. 10/15/20: Immediate recall of visuospatial Constructional task and pt was 80% accurate with min cues. 10/1/20: ROMIE completed this date and pt did exhibit at least one significant error. 20: Immediate recall of word placement for 4  words: pt was 100% accurate. Hmwk for 5 words.  9/3/20: 4 related words from homework: pt was 2/4 I'ly. Moved to un-related pictures for visual ease and pt was 2/4. 8/27/20: 4 related words and pt was 1/4 and improved to 3/4 and 4/4 with mneumonic device.   -HG     Patient’s memory skills will be enhanced as reported by patient by utilizing internal memory strategies to recall up to 3 pieces of information after a 5- minute delay  80%:;with cues  -HG     Status: Patient’s memory skills will be enhanced as reported by patient by utilizing internal memory strategies to recall up to 3 pieces of information after a 5- minute delay  Progressing as expected  -HG     Comments: Patient’s memory skills will be enhanced as reported by patient by utilizing internal memory strategies to recall up to 3 pieces of information after a 5- minute delay  11/12/20: Delayed recall of 8 related pictures from homework: pt was 8/8 x 2. 11/5/20: RBANS Delayed recall: pt was improved to a 79 from a 60.  10/29/20: Delayed recall of 6 related pictures: pt was 4/6. With use of first letter initials, pt was 6/6 accurate. 7th word added: pt was 7/7 x 3.   10/22/20: Delayed recall of 5 related pictures and pt was 5/5, 4/5, 5/5 x 2.  Added a sixth picture and pt was 5/6 . 10/15/20: Delayed recall of 4 related picture relative to pt and pt was 3/4 x 3. 10/1/20: FROMAJE: Delayed recall also exhibited at least one significant error. 9/17/20: 4 related words from homework and pt was 1/4- changed to 4 words that pertain to pt and he was 3/4.  9/10/20: 4 un-related pictures from homework: pt was 3/4.  2/4- moved to 4 related pictures and pt was 0/4 I'ly. With cues, pt was 3/4.   -HG     Patient’s memory skills will be enhanced as reported by patient by using external memory aides  80%:;with cues  -HG     Status: Patient’s memory skills will be enhanced as reported by patient by using external memory aides  Progressing as expected  -HG     Comments:  "Patient’s memory skills will be enhanced as reported by patient by using external memory aides  11/12/20: Education with pt for use of journal and bringing it to next therapy session.  11/5/20: Education and expectation of using his journal daily and returning to therapy with it next week. 10/29/20: cont'd education for use of daily journal. Provided a visual prompt.   10/15/20: Pt reports writing a little- pt states that he doesn't write very well.  10/1/20: Significant education with pt and wife regarding use of journal and/or notes lavon on phone. 9/17/20: Pt writing in journal but not in great detail and not consistent- education ongoing.  9/10/20: Pt stated that he has written in his journal a little. Provided visual prompts to remind pt to write in his journal. 9/3/20: Pt states that he journaling \"a little\". 8/27/20: Education for use of journal and the need for his delayed recall.   -HG     Patient will demonstrate improved ability to recall information by stating activities patient has completed that day  80%:;with cues  -HG     Status: Patient will demonstrate improved ability to recall information by stating activities patient has completed that day  Progressing as expected  -HG     Comments: Patient will demonstrate improved ability to recall information by stating activities patient has completed that day  11/12/20: Pt states that he does the same thing every day. 10/22/20: When asked about his weekend, he was able to provide general information, not specifics of places or things seen. 10/1/20: Thorough education with pt and wife regarding use of journal. Pt admitted having to admit to his deficits. 8/27/20: Education for recall of what he has done throughout the day. Pt states that he never really worried about these things, \"That's why I had 2 secretaries.\"   -HG        Attention/Orientation Goals    Patient will be able to execute all activities necessary to manage a home  Independently;With Cues  -HG  "    Status: Patient will be able to execute all activities necessary to manage a home  Progressing as expected  -HG     Comments: Patient will be able to execute all activities necessary to manage a home  11/5/20: RBANS Attention Score: Improved to a 103 from an 88. 10/1/20: FROMAJE for attention and pt exhibited fast processing with minimal errors.   -HG     Patient will improve attention skills by sustaining consistent behavioral response during continuous and repetitive activity (e.g., listening for target words, auditory/reading comprehension tasks)  with cues;10 minute task  -HG     Status: Patient will improve attention skills by sustaining consistent behavioral response during continuous and repetitive activity (e.g., listening for target words, auditory/reading comprehension tasks)  Progressing as expected  -HG     Comments: Patient will improve attention skills by sustaining consistent behavioral response during continuous and repetitive activity (e.g., listening for target words, auditory/reading comprehension tasks)  10/22/20: Recognition of cards presented quickly: pt was nearly 100% accurate. 9/10/20: Card ID for a card that is one less than one before it:   -HG     Patient will improve attention skills by sustaining focus in order to actively hold and manipulate information provided (e.g., sequencing auditorily presented number series in ascending or descending order)  80%:;with cues  -HG     Status: Patient will improve attention skills by sustaining focus in order to actively hold and manipulate information provided (e.g., sequencing auditorily presented number series in ascending or descending order)  Progressing as expected  -HG     Comments: Patient will improve attention skills by sustaining focus in order to actively hold and manipulate information provided (e.g., sequencing auditorily presented number series in ascending or descending order)  10/22/20: Digit Span: pt continues to be successful  with 6 digits. 7 digits: 6/7. 9/3/20: Digit Span: pt was able to recall up to 6 digits.   -HG     Patient will improve attention skills by alternating or shifting focus between two different tasks in order to complete both tasks  80%:;with cues  -HG     Status: Patient will improve attention skills by alternating or shifting focus between two different tasks in order to complete both tasks  Progressing as expected  -HG     Comments: Patient will improve attention skills by alternating or shifting focus between two different tasks in order to complete both tasks  11/12/20: Card sorting by N and suit: pt was 80-90% accurate with 2 run throughs.  10/22/20: Alternating attention for cards with N and into their suit and pt was 80% accurate. 9/3/20: Alternating attention for cards with N and sorting into suit and pt was 50% accurate.   -HG     Patient will improve attention skills by dividing focus and responding simultaneously to multiple tasks or in order to complete task  80%:;with cues  -HG     Status: Patient will improve attention skills by dividing focus and responding simultaneously to multiple tasks or in order to complete task  Progressing as expected  -HG     Comments: Patient will improve attention skills by dividing focus and responding simultaneously to multiple tasks or in order to complete task  10/29/20: Card sorting based on order per suit and high to low. 9/10/20: Divided attention for card sorting according to suit and specific date: pt was 90% accurate.   -HG     Patient will improve attention skills by sustaining focus to high-level cognitive tasks in order to complete task  80%:;with cues  -HG     Status: Patient will improve attention skills by sustaining focus to high-level cognitive tasks in order to complete task  Progressing as expected  -HG     Comments: Patient will improve attention skills by sustaining focus to high-level cognitive tasks in order to complete task  10/15/20: One Pile Card  Game: reviewed before play: pt was 80%accurate. 8/27/20: One Pile Card Game: Pt was 75% accurate.   -HG        Other Goals    Other Adult Goal- 1  Pt will improve RBANS Total to at least a 90 placing pt in the  Average range.   -HG     Status: Other Adult Goal- 1  Progressing as expected;Revised  -HG     Comments: Other Adult Goal- 1  11/5/30: RBANS Total of 82 improved from a 77. 10/1/20: FROMAJE: Pt score a 13- Stage 4.3. 9/10/20: RBANS not re-administered this date, pt given SLUMS and scored a 19/30.   -HG     Other Adult Goal- 2  Pt will complete high level reasoning tasks and problem solving with at least 80% accuracy.  -HG     Status: Other Adult Goal- 2  New;Progressing as expected  -HG     Comments: Other Adult Goal- 2  10/22/20: Deduction puzzle: pt was 70% accurate.    -HG        SLP Time Calculation    SLP Goal Re-Cert Due Date  12/05/20  -HG       User Key  (r) = Recorded By, (t) = Taken By, (c) = Cosigned By    Initials Name Provider Type    Candy Bello MS CCC-SLP Speech and Language Pathologist          OP SLP Education     Row Name 11/12/20 0900       Education    Education Comments  Hmwk for 3 un-related words.   -HG      User Key  (r) = Recorded By, (t) = Taken By, (c) = Cosigned By    Initials Name Effective Dates    Candy Bello MS CCC-SLP 08/09/20 -           OP SLP Assessment/Plan - 11/12/20 0900        SLP Plan    Plan Comments  Cont with Cog tx.    -HG      User Key  (r) = Recorded By, (t) = Taken By, (c) = Cosigned By    Initials Name Provider Type    Candy Bello MS CCC-SLP Speech and Language Pathologist                 Time Calculation:   SLP Start Time: 0900    Therapy Charges for Today     Code Description Service Date Service Provider Modifiers Qty    [id number]  ST TREATMENT SPEECH 4 11/12/2020 Candy Rubin MS CCC-SLP GN 1                   Candy Rubin MS CCC-SLP  11/12/2020

## 2020-11-19 ENCOUNTER — HOSPITAL ENCOUNTER (OUTPATIENT)
Dept: SPEECH THERAPY | Facility: HOSPITAL | Age: 75
Setting detail: THERAPIES SERIES
Discharge: HOME | End: 2020-11-19

## 2020-11-19 DIAGNOSIS — G31.84 MILD COGNITIVE IMPAIRMENT: Primary | ICD-10-CM

## 2020-11-19 PROCEDURE — 92507 TX SP LANG VOICE COMM INDIV: CPT

## 2020-11-19 NOTE — THERAPY TREATMENT NOTE
Outpatient Speech Language Pathology   Adult Speech Language Cognitive Treatment Note  [facility]     Patient Name: Aj Marie  : 1945  MRN: [id number]  Today's Date: 2020         Visit Date: 2020   Patient Active Problem List   Diagnosis   • First degree atrioventricular block   • OA (osteoarthritis)   • Hearing loss   • Essential hypertension   • Candidate for statin therapy due to risk of future cardiovascular event   • Atypical chest pain   • Mild cognitive impairment          Visit Dx:    ICD-10-CM ICD-9-CM   1. Mild cognitive impairment  G31.84 331.83                         SLP OP Goals     Row Name 20 0900          Goal Type Needed    Goal Type Needed  Memory;Attention/Orientation;Other Adult Goals  -HG        Subjective Comments    Subjective Comments  Pt alert, cooperative, did not return with journal- left it in the car (a reminder call was made)  -HG        Memory Goals    Patient will be able to remember information needed to return to work and function on work-related tasks  90%:;with intermittent cues  -HG     Status: Patient will be able to remember information needed to return to work and function on work-related tasks  New  -HG     Patient will demonstrate improved ability to recall information by immediately recalling a series of words  80%:;related;after delay;with cues  -HG     Status: Patient will demonstrate improved ability to recall information by immediately recalling a series of words  Progressing as expected  -HG     Comments: Patient will demonstrate improved ability to recall information by immediately recalling a series of words  20: Immediate recall of picture scene: pt was 70% accurate.  20: Immediate recall of a picture scene and pt was 70% accurate with min cues. 20: RBANS Immediate recall score: Pt improved to an 81 from a 73. 10/15/20: Immediate recall of visuospatial Constructional task and pt was 80% accurate with min cues.  10/1/20: ROMIE completed this date and pt did exhibit at least one significant error. 9/17/20: Immediate recall of word placement for 4 words: pt was 100% accurate. Hmwk for 5 words.  9/3/20: 4 related words from homework: pt was 2/4 I'ly. Moved to un-related pictures for visual ease and pt was 2/4. 8/27/20: 4 related words and pt was 1/4 and improved to 3/4 and 4/4 with mneumonic device.   -HG     Patient’s memory skills will be enhanced as reported by patient by utilizing internal memory strategies to recall up to 3 pieces of information after a 5- minute delay  80%:;with cues  -HG     Status: Patient’s memory skills will be enhanced as reported by patient by utilizing internal memory strategies to recall up to 3 pieces of information after a 5- minute delay  Progressing as expected  -HG     Comments: Patient’s memory skills will be enhanced as reported by patient by utilizing internal memory strategies to recall up to 3 pieces of information after a 5- minute delay  11/19/20: Delayed recall of 3 un-related words: pt was 2/3 with cues x 4. 11/12/20: Delayed recall of 8 related pictures from homework: pt was 8/8 x 2. 11/5/20: RBANS Delayed recall: pt was improved to a 79 from a 60.  10/29/20: Delayed recall of 6 related pictures: pt was 4/6. With use of first letter initials, pt was 6/6 accurate. 7th word added: pt was 7/7 x 3.   10/22/20: Delayed recall of 5 related pictures and pt was 5/5, 4/5, 5/5 x 2.  Added a sixth picture and pt was 5/6 . 10/15/20: Delayed recall of 4 related picture relative to pt and pt was 3/4 x 3. 10/1/20: ROMIE: Delayed recall also exhibited at least one significant error. 9/17/20: 4 related words from homework and pt was 1/4- changed to 4 words that pertain to pt and he was 3/4.  9/10/20: 4 un-related pictures from homework: pt was 3/4.  2/4- moved to 4 related pictures and pt was 0/4 I'ly. With cues, pt was 3/4.   -HG     Patient’s memory skills will be enhanced as reported by  "patient by using external memory aides  80%:;with cues  -HG     Status: Patient’s memory skills will be enhanced as reported by patient by using external memory aides  Progressing as expected  -HG     Comments: Patient’s memory skills will be enhanced as reported by patient by using external memory aides  11/19/20: Pt states \"a little bit.\" 11/12/20: Education with pt for use of journal and bringing it to next therapy session.  11/5/20: Education and expectation of using his journal daily and returning to therapy with it next week. 10/29/20: cont'd education for use of daily journal. Provided a visual prompt.   10/15/20: Pt reports writing a little- pt states that he doesn't write very well.  10/1/20: Significant education with pt and wife regarding use of journal and/or notes lavon on phone. 9/17/20: Pt writing in journal but not in great detail and not consistent- education ongoing.  9/10/20: Pt stated that he has written in his journal a little. Provided visual prompts to remind pt to write in his journal. 9/3/20: Pt states that he journaling \"a little\". 8/27/20: Education for use of journal and the need for his delayed recall.   -HG     Patient will demonstrate improved ability to recall information by stating activities patient has completed that day  80%:;with cues  -HG     Status: Patient will demonstrate improved ability to recall information by stating activities patient has completed that day  Progressing as expected  -HG     Comments: Patient will demonstrate improved ability to recall information by stating activities patient has completed that day  11/12/20: Pt states that he does the same thing every day. 10/22/20: When asked about his weekend, he was able to provide general information, not specifics of places or things seen. 10/1/20: Thorough education with pt and wife regarding use of journal. Pt admitted having to admit to his deficits. 8/27/20: Education for recall of what he has done throughout the " "day. Pt states that he never really worried about these things, \"That's why I had 2 secretaries.\"   -HG        Attention/Orientation Goals    Patient will be able to execute all activities necessary to manage a home  Independently;With Cues  -HG     Status: Patient will be able to execute all activities necessary to manage a home  Progressing as expected  -HG     Comments: Patient will be able to execute all activities necessary to manage a home  11/5/20: RBANS Attention Score: Improved to a 103 from an 88. 10/1/20: FROMAJE for attention and pt exhibited fast processing with minimal errors.   -HG     Patient will improve attention skills by sustaining consistent behavioral response during continuous and repetitive activity (e.g., listening for target words, auditory/reading comprehension tasks)  with cues;10 minute task  -HG     Status: Patient will improve attention skills by sustaining consistent behavioral response during continuous and repetitive activity (e.g., listening for target words, auditory/reading comprehension tasks)  Progressing as expected  -HG     Comments: Patient will improve attention skills by sustaining consistent behavioral response during continuous and repetitive activity (e.g., listening for target words, auditory/reading comprehension tasks)  10/22/20: Recognition of cards presented quickly: pt was nearly 100% accurate. 9/10/20: Card ID for a card that is one less than one before it:   -HG     Patient will improve attention skills by sustaining focus in order to actively hold and manipulate information provided (e.g., sequencing auditorily presented number series in ascending or descending order)  80%:;with cues  -HG     Status: Patient will improve attention skills by sustaining focus in order to actively hold and manipulate information provided (e.g., sequencing auditorily presented number series in ascending or descending order)  Progressing as expected  -HG     Comments: Patient will " improve attention skills by sustaining focus in order to actively hold and manipulate information provided (e.g., sequencing auditorily presented number series in ascending or descending order)  10/22/20: Digit Span: pt continues to be successful with 6 digits. 7 digits: 6/7. 9/3/20: Digit Span: pt was able to recall up to 6 digits.   -HG     Patient will improve attention skills by alternating or shifting focus between two different tasks in order to complete both tasks  80%:;with cues  -HG     Status: Patient will improve attention skills by alternating or shifting focus between two different tasks in order to complete both tasks  Progressing as expected  -HG     Comments: Patient will improve attention skills by alternating or shifting focus between two different tasks in order to complete both tasks  11/12/20: Card sorting by N and suit: pt was 80-90% accurate with 2 run throughs.  10/22/20: Alternating attention for cards with N and into their suit and pt was 80% accurate. 9/3/20: Alternating attention for cards with N and sorting into suit and pt was 50% accurate.   -HG     Patient will improve attention skills by dividing focus and responding simultaneously to multiple tasks or in order to complete task  80%:;with cues  -HG     Status: Patient will improve attention skills by dividing focus and responding simultaneously to multiple tasks or in order to complete task  Progressing as expected  -HG     Comments: Patient will improve attention skills by dividing focus and responding simultaneously to multiple tasks or in order to complete task  10/29/20: Card sorting based on order per suit and high to low. 9/10/20: Divided attention for card sorting according to suit and specific date: pt was 90% accurate.   -HG     Patient will improve attention skills by sustaining focus to high-level cognitive tasks in order to complete task  80%:;with cues  -HG     Status: Patient will improve attention skills by sustaining  focus to high-level cognitive tasks in order to complete task  Progressing as expected  -HG     Comments: Patient will improve attention skills by sustaining focus to high-level cognitive tasks in order to complete task  11/19/20: One Pile Card Game: pt was impulsive and required mod cues and was 80% accurate.  10/15/20: One Pile Card Game: reviewed before play: pt was 80%accurate. 8/27/20: One Pile Card Game: Pt was 75% accurate.   -HG        Other Goals    Other Adult Goal- 1  Pt will improve RBANS Total to at least a 90 placing pt in the  Average range.   -HG     Status: Other Adult Goal- 1  Progressing as expected;Revised  -HG     Comments: Other Adult Goal- 1  11/5/30: RBANS Total of 82 improved from a 77. 10/1/20: FROMAJE: Pt score a 13- Stage 4.3. 9/10/20: RBANS not re-administered this date, pt given SLUMS and scored a 19/30.   -HG     Other Adult Goal- 2  Pt will complete high level reasoning tasks and problem solving with at least 80% accuracy.  -HG     Status: Other Adult Goal- 2  New;Progressing as expected  -HG     Comments: Other Adult Goal- 2  10/22/20: Deduction puzzle: pt was 70% accurate.    -HG        SLP Time Calculation    SLP Goal Re-Cert Due Date  12/05/20  -HG       User Key  (r) = Recorded By, (t) = Taken By, (c) = Cosigned By    Initials Name Provider Type    ALYSON CarynCandy pabon MS JOAO CCC-SLP Speech and Language Pathologist          OP SLP Education     Row Name 11/19/20 0900       Education    Education Comments  Hmwk for cont'd 3 un-related words.   -HG      User Key  (r) = Recorded By, (t) = Taken By, (c) = Cosigned By    Initials Name Effective Dates    Candy Bello MS CCC-SLP 08/09/20 -           OP SLP Assessment/Plan - 11/19/20 0900        SLP Plan    Plan Comments  Cont with Cog tx.    -HG      User Key  (r) = Recorded By, (t) = Taken By, (c) = Cosigned By    Initials Name Provider Type    ALYSON Cancm Candy SHEPHERD MS CCC-SLP Speech and Language Pathologist                 Time  Calculation:   SLP Start Time: 0900    Therapy Charges for Today     Code Description Service Date Service Provider Modifiers Qty    [id number]  ST TREATMENT SPEECH 4 11/19/2020 Candy Rubin, MS CCC-SLP GN 1                   Candy Rubin MS CCC-SLP  11/19/2020

## 2020-12-03 ENCOUNTER — HOSPITAL ENCOUNTER (OUTPATIENT)
Dept: SPEECH THERAPY | Facility: HOSPITAL | Age: 75
Setting detail: THERAPIES SERIES
Discharge: HOME | End: 2020-12-03

## 2020-12-03 DIAGNOSIS — G31.84 MILD COGNITIVE IMPAIRMENT: Primary | ICD-10-CM

## 2020-12-03 PROCEDURE — 92507 TX SP LANG VOICE COMM INDIV: CPT

## 2020-12-03 NOTE — THERAPY PROGRESS REPORT/RE-CERT
Outpatient Speech Language Pathology   Adult Speech Language Cognitive Progress Note  [facility]     Patient Name: Aj Marie  : 1945  MRN: [id number]  Today's Date: 12/3/2020         Visit Date: 2020   Patient Active Problem List   Diagnosis   • First degree atrioventricular block   • OA (osteoarthritis)   • Hearing loss   • Essential hypertension   • Candidate for statin therapy due to risk of future cardiovascular event   • Atypical chest pain   • Mild cognitive impairment          Visit Dx:    ICD-10-CM ICD-9-CM   1. Mild cognitive impairment  G31.84 331.83                         SLP OP Goals     Row Name 20 0900          Goal Type Needed    Goal Type Needed  Memory;Attention/Orientation;Other Adult Goals  -HG        Subjective Comments    Subjective Comments  Pt alert, cooperative. Didn't return with journal. Noted that he left it on the kitchen table.   -HG        Memory Goals    Patient will be able to remember information needed to return to work and function on work-related tasks  90%:;with intermittent cues  -HG     Status: Patient will be able to remember information needed to return to work and function on work-related tasks  New  -HG     Patient will demonstrate improved ability to recall information by immediately recalling a series of words  80%:;related;after delay;with cues  -HG     Status: Patient will demonstrate improved ability to recall information by immediately recalling a series of words  Progressing as expected  -HG     Comments: Patient will demonstrate improved ability to recall information by immediately recalling a series of words  20: Immediate recall of picture scene: pt was 70% accurate.  20: Immediate recall of a picture scene and pt was 70% accurate with min cues. 20: RBANS Immediate recall score: Pt improved to an 81 from a 73. 10/15/20: Immediate recall of visuospatial Constructional task and pt was 80% accurate with min cues.  10/1/20: ROMIE completed this date and pt did exhibit at least one significant error. 9/17/20: Immediate recall of word placement for 4 words: pt was 100% accurate. Hmwk for 5 words.  9/3/20: 4 related words from homework: pt was 2/4 I'ly. Moved to un-related pictures for visual ease and pt was 2/4. 8/27/20: 4 related words and pt was 1/4 and improved to 3/4 and 4/4 with mneumonic device.   -HG     Patient’s memory skills will be enhanced as reported by patient by utilizing internal memory strategies to recall up to 3 pieces of information after a 5- minute delay  80%:;with cues  -HG     Status: Patient’s memory skills will be enhanced as reported by patient by utilizing internal memory strategies to recall up to 3 pieces of information after a 5- minute delay  Progressing as expected  -HG     Comments: Patient’s memory skills will be enhanced as reported by patient by utilizing internal memory strategies to recall up to 3 pieces of information after a 5- minute delay  12/3/20: Delayed recall for 3 un-related and pt was 2/3 x 2 with review and with use of a sentence, pt was 3/3. 11/19/20: Delayed recall of 3 un-related words: pt was 2/3 with cues x 4. 11/12/20: Delayed recall of 8 related pictures from homework: pt was 8/8 x 2. 11/5/20: RBANS Delayed recall: pt was improved to a 79 from a 60.  10/29/20: Delayed recall of 6 related pictures: pt was 4/6. With use of first letter initials, pt was 6/6 accurate. 7th word added: pt was 7/7 x 3.   10/22/20: Delayed recall of 5 related pictures and pt was 5/5, 4/5, 5/5 x 2.  Added a sixth picture and pt was 5/6 . 10/15/20: Delayed recall of 4 related picture relative to pt and pt was 3/4 x 3. 10/1/20: ROMIE: Delayed recall also exhibited at least one significant error. 9/17/20: 4 related words from homework and pt was 1/4- changed to 4 words that pertain to pt and he was 3/4.  9/10/20: 4 un-related pictures from homework: pt was 3/4.  2/4- moved to 4 related pictures  "and pt was 0/4 I'ly. With cues, pt was 3/4.   -HG     Patient’s memory skills will be enhanced as reported by patient by using external memory aides  80%:;with cues  -HG     Status: Patient’s memory skills will be enhanced as reported by patient by using external memory aides  Progressing as expected  -HG     Comments: Patient’s memory skills will be enhanced as reported by patient by using external memory aides  12/3/20: Pt reports writing in journal every other day. 11/19/20: Pt states \"a little bit.\" 11/12/20: Education with pt for use of journal and bringing it to next therapy session.  11/5/20: Education and expectation of using his journal daily and returning to therapy with it next week. 10/29/20: cont'd education for use of daily journal. Provided a visual prompt.   10/15/20: Pt reports writing a little- pt states that he doesn't write very well.  10/1/20: Significant education with pt and wife regarding use of journal and/or notes lavon on phone. 9/17/20: Pt writing in journal but not in great detail and not consistent- education ongoing.  9/10/20: Pt stated that he has written in his journal a little. Provided visual prompts to remind pt to write in his journal. 9/3/20: Pt states that he journaling \"a little\". 8/27/20: Education for use of journal and the need for his delayed recall.   -HG     Patient will demonstrate improved ability to recall information by stating activities patient has completed that day  80%:;with cues  -HG     Status: Patient will demonstrate improved ability to recall information by stating activities patient has completed that day  Progressing as expected  -HG     Comments: Patient will demonstrate improved ability to recall information by stating activities patient has completed that day  11/12/20: Pt states that he does the same thing every day. 10/22/20: When asked about his weekend, he was able to provide general information, not specifics of places or things seen. 10/1/20: " "Thorough education with pt and wife regarding use of journal. Pt admitted having to admit to his deficits. 8/27/20: Education for recall of what he has done throughout the day. Pt states that he never really worried about these things, \"That's why I had 2 secretaries.\"   -HG        Attention/Orientation Goals    Patient will be able to execute all activities necessary to manage a home  Independently;With Cues  -HG     Status: Patient will be able to execute all activities necessary to manage a home  Progressing as expected  -HG     Comments: Patient will be able to execute all activities necessary to manage a home  11/5/20: RBANS Attention Score: Improved to a 103 from an 88. 10/1/20: FROMAJE for attention and pt exhibited fast processing with minimal errors.   -HG     Patient will improve attention skills by sustaining consistent behavioral response during continuous and repetitive activity (e.g., listening for target words, auditory/reading comprehension tasks)  with cues;10 minute task  -HG     Status: Patient will improve attention skills by sustaining consistent behavioral response during continuous and repetitive activity (e.g., listening for target words, auditory/reading comprehension tasks)  Progressing as expected  -HG     Comments: Patient will improve attention skills by sustaining consistent behavioral response during continuous and repetitive activity (e.g., listening for target words, auditory/reading comprehension tasks)  10/22/20: Recognition of cards presented quickly: pt was nearly 100% accurate. 9/10/20: Card ID for a card that is one less than one before it:   -HG     Patient will improve attention skills by sustaining focus in order to actively hold and manipulate information provided (e.g., sequencing auditorily presented number series in ascending or descending order)  80%:;with cues  -HG     Status: Patient will improve attention skills by sustaining focus in order to actively hold and " manipulate information provided (e.g., sequencing auditorily presented number series in ascending or descending order)  Progressing as expected  -HG     Comments: Patient will improve attention skills by sustaining focus in order to actively hold and manipulate information provided (e.g., sequencing auditorily presented number series in ascending or descending order)  12/3/20: Mental Manipulation for word progression: pt was 90% accurate.  10/22/20: Digit Span: pt continues to be successful with 6 digits. 7 digits: 6/7. 9/3/20: Digit Span: pt was able to recall up to 6 digits.   -HG     Patient will improve attention skills by alternating or shifting focus between two different tasks in order to complete both tasks  80%:;with cues  -HG     Status: Patient will improve attention skills by alternating or shifting focus between two different tasks in order to complete both tasks  Progressing as expected  -HG     Comments: Patient will improve attention skills by alternating or shifting focus between two different tasks in order to complete both tasks  11/12/20: Card sorting by N and suit: pt was 80-90% accurate with 2 run throughs.  10/22/20: Alternating attention for cards with N and into their suit and pt was 80% accurate. 9/3/20: Alternating attention for cards with N and sorting into suit and pt was 50% accurate.   -HG     Patient will improve attention skills by dividing focus and responding simultaneously to multiple tasks or in order to complete task  80%:;with cues  -HG     Status: Patient will improve attention skills by dividing focus and responding simultaneously to multiple tasks or in order to complete task  Progressing as expected  -HG     Comments: Patient will improve attention skills by dividing focus and responding simultaneously to multiple tasks or in order to complete task  12/3/20: Alternating attention for visual scanning and pt's impulsivity plays ar role but pt improved to 90% accurate.   10/29/20: Card sorting based on order per suit and high to low. 9/10/20: Divided attention for card sorting according to suit and specific date: pt was 90% accurate.   -HG     Patient will improve attention skills by sustaining focus to high-level cognitive tasks in order to complete task  80%:;with cues  -HG     Status: Patient will improve attention skills by sustaining focus to high-level cognitive tasks in order to complete task  Progressing as expected  -HG     Comments: Patient will improve attention skills by sustaining focus to high-level cognitive tasks in order to complete task  12/3/20: One Pile Card Game: pt was 80% accurate with mod cues. Pt continues to be impulsive when playing Independently and was 70% accurate.  11/19/20: One Pile Card Game: pt was impulsive and required mod cues and was 80% accurate.  10/15/20: One Pile Card Game: reviewed before play: pt was 80%accurate. 8/27/20: One Pile Card Game: Pt was 75% accurate.   -HG        Other Goals    Other Adult Goal- 1  Pt will improve RBANS Total to at least a 90 placing pt in the  Average range.   -HG     Status: Other Adult Goal- 1  Progressing as expected;Revised  -HG     Comments: Other Adult Goal- 1  11/5/30: RBANS Total of 82 improved from a 77. 10/1/20: FROMAJE: Pt score a 13- Stage 4.3. 9/10/20: RBANS not re-administered this date, pt given SLUMS and scored a 19/30.   -HG     Other Adult Goal- 2  Pt will complete high level reasoning tasks and problem solving with at least 80% accuracy.  -HG     Status: Other Adult Goal- 2  New;Progressing as expected  -HG     Comments: Other Adult Goal- 2  10/22/20: Deduction puzzle: pt was 70% accurate.    -HG        SLP Time Calculation    SLP Goal Re-Cert Due Date  01/02/21  -HG       User Key  (r) = Recorded By, (t) = Taken By, (c) = Cosigned By    Initials Name Provider Type    Candy Bello MS CCC-SLP Speech and Language Pathologist          OP SLP Education     Row Name 12/03/20 0900        "Education    Barriers to Learning  No barriers identified  -HG    Education Comments  Hmwk for 4 un-related words.   -HG      User Key  (r) = Recorded By, (t) = Taken By, (c) = Cosigned By    Initials Name Effective Dates    ALYSON CarynCandy MS CCC-SLP 08/09/20 -           OP SLP Assessment/Plan - 12/03/20 0900        SLP Assessment    Functional Problems  Speech Language- Adult/Cognition   -HG    Impact on Function: Adult Speech Language/Cognition  Trouble learning or remembering new information;Poor attention to task   -HG    Clinical Impression: Speech Language-Adult/Congnition  Mild-Moderate:;Cognitive Communication Impairment   -HG    Functional Problems Comment  Pt reports that he remains \"fuzzy\" at times and not able to recall things immediatly.   -HG    Clinical Impression Comments  RBANS not re-administered this date due to short time between last re-assessment.   -HG    Please refer to paper survey for additional self-reported information  No   -HG    Please refer to items scanned into chart for additional diagnostic informaiton and handouts as provided by clinician  No   -HG    SLP Diagnosis  mild to moderate cognitive communication imparirment   -HG    Prognosis  Excellent (comment)   -HG    Patient/caregiver participated in establishment of treatment plan and goals  Yes   -HG    Patient would benefit from skilled therapy intervention  Yes   -HG       SLP Plan    Frequency  1-2x/week   -HG    Duration  46- weeks   -HG    Planned CPT's?  SLP INDIVIDUAL SPEECH THERAPY: [id number]   -HG    Expected Duration of Therapy Session (SLP Eval)  60   -HG    Plan Comments  Cont with Cog tx.    -HG      User Key  (r) = Recorded By, (t) = Taken By, (c) = Cosigned By    Initials Name Provider Type    ALYSON RubinCandy MS CCC-SLP Speech and Language Pathologist                 Time Calculation:   SLP Start Time: 0900    Therapy Charges for Today     Code Description Service Date Service Provider Modifiers Qty    " [id number]  ST TREATMENT SPEECH 4 12/3/2020 Candy Rubin, MS CCC-SLP GN 1                   Candy Rubin MS CCC-SLP  12/3/2020

## 2020-12-10 ENCOUNTER — HOSPITAL ENCOUNTER (OUTPATIENT)
Dept: SPEECH THERAPY | Facility: HOSPITAL | Age: 75
Setting detail: THERAPIES SERIES
Discharge: HOME | End: 2020-12-10

## 2020-12-10 DIAGNOSIS — G31.84 MILD COGNITIVE IMPAIRMENT: Primary | ICD-10-CM

## 2020-12-10 PROCEDURE — 92507 TX SP LANG VOICE COMM INDIV: CPT

## 2020-12-10 NOTE — THERAPY TREATMENT NOTE
Outpatient Speech Language Pathology   Adult Speech Language Cognitive Treatment Note  [facility]     Patient Name: Aj Marie  : 1945  MRN: [id number]  Today's Date: 12/10/2020         Visit Date: 12/10/2020   Patient Active Problem List   Diagnosis   • First degree atrioventricular block   • OA (osteoarthritis)   • Hearing loss   • Essential hypertension   • Candidate for statin therapy due to risk of future cardiovascular event   • Atypical chest pain   • Mild cognitive impairment          Visit Dx:    ICD-10-CM ICD-9-CM   1. Mild cognitive impairment  G31.84 331.83                         SLP OP Goals     Row Name 12/10/20 0900          Goal Type Needed    Goal Type Needed  Memory;Attention/Orientation;Other Adult Goals  -HG        Subjective Comments    Subjective Comments  Pt alert, cooperative, did not return with his journal.   -HG        Memory Goals    Patient will be able to remember information needed to return to work and function on work-related tasks  90%:;with intermittent cues  -HG     Status: Patient will be able to remember information needed to return to work and function on work-related tasks  New  -HG     Patient will demonstrate improved ability to recall information by immediately recalling a series of words  80%:;related;after delay;with cues  -HG     Status: Patient will demonstrate improved ability to recall information by immediately recalling a series of words  Progressing as expected  -HG     Comments: Patient will demonstrate improved ability to recall information by immediately recalling a series of words  20: Immediate recall of picture scene: pt was 70% accurate.  20: Immediate recall of a picture scene and pt was 70% accurate with min cues. 20: RBANS Immediate recall score: Pt improved to an 81 from a 73. 10/15/20: Immediate recall of visuospatial Constructional task and pt was 80% accurate with min cues. 10/1/20: ROMIE completed this date and  pt did exhibit at least one significant error. 9/17/20: Immediate recall of word placement for 4 words: pt was 100% accurate. Hmwk for 5 words.  9/3/20: 4 related words from homework: pt was 2/4 I'ly. Moved to un-related pictures for visual ease and pt was 2/4. 8/27/20: 4 related words and pt was 1/4 and improved to 3/4 and 4/4 with mneumonic device.   -HG     Patient’s memory skills will be enhanced as reported by patient by utilizing internal memory strategies to recall up to 3 pieces of information after a 5- minute delay  80%:;with cues  -HG     Status: Patient’s memory skills will be enhanced as reported by patient by utilizing internal memory strategies to recall up to 3 pieces of information after a 5- minute delay  Progressing as expected  -HG     Comments: Patient’s memory skills will be enhanced as reported by patient by utilizing internal memory strategies to recall up to 3 pieces of information after a 5- minute delay  12/10/20: Delayed recall of 4 un-related words from homework and pt had no studied them and SLP reviewed and with a delay, pt was 3/4 consistently. 12/3/20: Delayed recall for 3 un-related and pt was 2/3 x 2 with review and with use of a sentence, pt was 3/3. 11/19/20: Delayed recall of 3 un-related words: pt was 2/3 with cues x 4. 11/12/20: Delayed recall of 8 related pictures from homework: pt was 8/8 x 2. 11/5/20: RBANS Delayed recall: pt was improved to a 79 from a 60.  10/29/20: Delayed recall of 6 related pictures: pt was 4/6. With use of first letter initials, pt was 6/6 accurate. 7th word added: pt was 7/7 x 3.   10/22/20: Delayed recall of 5 related pictures and pt was 5/5, 4/5, 5/5 x 2.  Added a sixth picture and pt was 5/6 . 10/15/20: Delayed recall of 4 related picture relative to pt and pt was 3/4 x 3. 10/1/20: FROMAJE: Delayed recall also exhibited at least one significant error. 9/17/20: 4 related words from homework and pt was 1/4- changed to 4 words that pertain to pt and  "he was 3/4.  9/10/20: 4 un-related pictures from homework: pt was 3/4.  2/4- moved to 4 related pictures and pt was 0/4 I'ly. With cues, pt was 3/4.   -HG     Patient’s memory skills will be enhanced as reported by patient by using external memory aides  80%:;with cues  -HG     Status: Patient’s memory skills will be enhanced as reported by patient by using external memory aides  Progressing as expected  -HG     Comments: Patient’s memory skills will be enhanced as reported by patient by using external memory aides  12/3/20: Pt reports writing in journal every other day. 11/19/20: Pt states \"a little bit.\" 11/12/20: Education with pt for use of journal and bringing it to next therapy session.  11/5/20: Education and expectation of using his journal daily and returning to therapy with it next week. 10/29/20: cont'd education for use of daily journal. Provided a visual prompt.   10/15/20: Pt reports writing a little- pt states that he doesn't write very well.  10/1/20: Significant education with pt and wife regarding use of journal and/or notes lavon on phone. 9/17/20: Pt writing in journal but not in great detail and not consistent- education ongoing.  9/10/20: Pt stated that he has written in his journal a little. Provided visual prompts to remind pt to write in his journal. 9/3/20: Pt states that he journaling \"a little\". 8/27/20: Education for use of journal and the need for his delayed recall.   -HG     Patient will demonstrate improved ability to recall information by stating activities patient has completed that day  80%:;with cues  -HG     Status: Patient will demonstrate improved ability to recall information by stating activities patient has completed that day  Progressing as expected  -HG     Comments: Patient will demonstrate improved ability to recall information by stating activities patient has completed that day  11/12/20: Pt states that he does the same thing every day. 10/22/20: When asked about his " "weekend, he was able to provide general information, not specifics of places or things seen. 10/1/20: Thorough education with pt and wife regarding use of journal. Pt admitted having to admit to his deficits. 8/27/20: Education for recall of what he has done throughout the day. Pt states that he never really worried about these things, \"That's why I had 2 secretaries.\"   -HG        Attention/Orientation Goals    Patient will be able to execute all activities necessary to manage a home  Independently;With Cues  -HG     Status: Patient will be able to execute all activities necessary to manage a home  Progressing as expected  -HG     Comments: Patient will be able to execute all activities necessary to manage a home  11/5/20: RBANS Attention Score: Improved to a 103 from an 88. 10/1/20: FROMAJE for attention and pt exhibited fast processing with minimal errors.   -HG     Patient will improve attention skills by sustaining consistent behavioral response during continuous and repetitive activity (e.g., listening for target words, auditory/reading comprehension tasks)  with cues;10 minute task  -HG     Status: Patient will improve attention skills by sustaining consistent behavioral response during continuous and repetitive activity (e.g., listening for target words, auditory/reading comprehension tasks)  Progressing as expected  -HG     Comments: Patient will improve attention skills by sustaining consistent behavioral response during continuous and repetitive activity (e.g., listening for target words, auditory/reading comprehension tasks)  12/10/20: APT TEST: Pt scored above average for all areas except Divided attention and that was below average. Pt very concerned it can't remember more than he does. 10/22/20: Recognition of cards presented quickly: pt was nearly 100% accurate. 9/10/20: Card ID for a card that is one less than one before it:   -HG     Patient will improve attention skills by sustaining focus in " order to actively hold and manipulate information provided (e.g., sequencing auditorily presented number series in ascending or descending order)  80%:;with cues  -HG     Status: Patient will improve attention skills by sustaining focus in order to actively hold and manipulate information provided (e.g., sequencing auditorily presented number series in ascending or descending order)  Progressing as expected  -HG     Comments: Patient will improve attention skills by sustaining focus in order to actively hold and manipulate information provided (e.g., sequencing auditorily presented number series in ascending or descending order)  12/3/20: Mental Manipulation for word progression: pt was 90% accurate.  10/22/20: Digit Span: pt continues to be successful with 6 digits. 7 digits: 6/7. 9/3/20: Digit Span: pt was able to recall up to 6 digits.   -HG     Patient will improve attention skills by alternating or shifting focus between two different tasks in order to complete both tasks  80%:;with cues  -HG     Status: Patient will improve attention skills by alternating or shifting focus between two different tasks in order to complete both tasks  Progressing as expected  -HG     Comments: Patient will improve attention skills by alternating or shifting focus between two different tasks in order to complete both tasks  12/10/20: Card Sorting by N and pt was less impulsive and used his own strategy for sorting and pt was 80% accurate. 11/12/20: Card sorting by N and suit: pt was 80-90% accurate with 2 run throughs.  10/22/20: Alternating attention for cards with N and into their suit and pt was 80% accurate. 9/3/20: Alternating attention for cards with N and sorting into suit and pt was 50% accurate.   -HG     Patient will improve attention skills by dividing focus and responding simultaneously to multiple tasks or in order to complete task  80%:;with cues  -HG     Status: Patient will improve attention skills by dividing  focus and responding simultaneously to multiple tasks or in order to complete task  Progressing as expected  -HG     Comments: Patient will improve attention skills by dividing focus and responding simultaneously to multiple tasks or in order to complete task  12/3/20: Alternating attention for visual scanning and pt's impulsivity plays ar role but pt improved to 90% accurate.  10/29/20: Card sorting based on order per suit and high to low. 9/10/20: Divided attention for card sorting according to suit and specific date: pt was 90% accurate.   -HG     Patient will improve attention skills by sustaining focus to high-level cognitive tasks in order to complete task  80%:;with cues  -HG     Status: Patient will improve attention skills by sustaining focus to high-level cognitive tasks in order to complete task  Progressing as expected  -HG     Comments: Patient will improve attention skills by sustaining focus to high-level cognitive tasks in order to complete task  12/3/20: One Pile Card Game: pt was 80% accurate with mod cues. Pt continues to be impulsive when playing Independently and was 70% accurate.  11/19/20: One Pile Card Game: pt was impulsive and required mod cues and was 80% accurate.  10/15/20: One Pile Card Game: reviewed before play: pt was 80%accurate. 8/27/20: One Pile Card Game: Pt was 75% accurate.   -HG        Other Goals    Other Adult Goal- 1  Pt will improve RBANS Total to at least a 90 placing pt in the  Average range.   -HG     Status: Other Adult Goal- 1  Progressing as expected;Revised  -HG     Comments: Other Adult Goal- 1  11/5/30: RBANS Total of 82 improved from a 77. 10/1/20: FROMAJE: Pt score a 13- Stage 4.3. 9/10/20: RBANS not re-administered this date, pt given SLUMS and scored a 19/30.   -HG     Other Adult Goal- 2  Pt will complete high level reasoning tasks and problem solving with at least 80% accuracy.  -HG     Status: Other Adult Goal- 2  New;Progressing as expected  -HG      Comments: Other Adult Goal- 2  10/22/20: Deduction puzzle: pt was 70% accurate.    -        SLP Time Calculation    SLP Goal Re-Cert Due Date  01/02/21  -HG       User Key  (r) = Recorded By, (t) = Taken By, (c) = Cosigned By    Initials Name Provider Type    Candy Bello MS CCC-SLP Speech and Language Pathologist          OP SLP Education     Row Name 12/10/20 0900       Education    Education Comments  Hwmk for 5 un-related words. Ongoing education for use of his journal in order to improve recall.   -      User Key  (r) = Recorded By, (t) = Taken By, (c) = Cosigned By    Initials Name Effective Dates    Candy Bello MS CCC-SLP 08/09/20 -           OP SLP Assessment/Plan - 12/10/20 0900        SLP Plan    Plan Comments  Cont with Cog tx.    -      User Key  (r) = Recorded By, (t) = Taken By, (c) = Cosigned By    Initials Name Provider Type    Candy Bello MS CCC-SLP Speech and Language Pathologist                 Time Calculation:   SLP Start Time: 0900    Therapy Charges for Today     Code Description Service Date Service Provider Modifiers Qty    [id number] HC ST TREATMENT SPEECH 4 12/10/2020 Candy Rubin MS CCC-SLP GN 1                   Candy Rubin MS CCC-ROZINA  12/10/2020

## 2020-12-17 ENCOUNTER — HOSPITAL ENCOUNTER (OUTPATIENT)
Dept: SPEECH THERAPY | Facility: HOSPITAL | Age: 75
Setting detail: THERAPIES SERIES
Discharge: HOME | End: 2020-12-17

## 2020-12-17 DIAGNOSIS — G31.84 MILD COGNITIVE IMPAIRMENT: Primary | ICD-10-CM

## 2020-12-17 PROCEDURE — 92507 TX SP LANG VOICE COMM INDIV: CPT

## 2021-01-20 RX ORDER — SODIUM, POTASSIUM,MAG SULFATES 17.5-3.13G
2 SOLUTION, RECONSTITUTED, ORAL ORAL TAKE AS DIRECTED
Qty: 354 ML | Refills: 0 | Status: SHIPPED | OUTPATIENT
Start: 2021-01-20 | End: 2021-02-24

## 2021-01-21 ENCOUNTER — HOSPITAL ENCOUNTER (OUTPATIENT)
Dept: SPEECH THERAPY | Facility: HOSPITAL | Age: 76
Setting detail: THERAPIES SERIES
Discharge: HOME | End: 2021-01-21

## 2021-01-21 DIAGNOSIS — G31.84 MILD COGNITIVE IMPAIRMENT: Primary | ICD-10-CM

## 2021-01-21 PROCEDURE — 92507 TX SP LANG VOICE COMM INDIV: CPT

## 2021-01-21 NOTE — THERAPY PROGRESS REPORT/RE-CERT
Outpatient Speech Language Pathology   Adult Speech Language Cognitive Progress Note  [facility]     Patient Name: Aj Marie  : 1945  MRN: [id number]  Today's Date: 2021         Visit Date: 2021   Patient Active Problem List   Diagnosis   • First degree atrioventricular block   • OA (osteoarthritis)   • Hearing loss   • Essential hypertension   • Candidate for statin therapy due to risk of future cardiovascular event   • Atypical chest pain   • Mild cognitive impairment          Visit Dx:    ICD-10-CM ICD-9-CM   1. Mild cognitive impairment  G31.84 331.83                         SLP OP Goals     Row Name 21 1100          Goal Type Needed    Goal Type Needed  Memory;Attention/Orientation;Other Adult Goals  -HG        Subjective Comments    Subjective Comments  Pt alert, cooperative, returned after a break from [place] over the [place] and wife notes (over the phone) that pt appears more confused.   -HG        Memory Goals    Patient will be able to remember information needed to return to work and function on work-related tasks  90%:;with intermittent cues  -HG     Status: Patient will be able to remember information needed to return to work and function on work-related tasks  New  -HG     Patient will demonstrate improved ability to recall information by immediately recalling a series of words  80%:;related;after delay;with cues  -HG     Status: Patient will demonstrate improved ability to recall information by immediately recalling a series of words  Progressing as expected  -HG     Comments: Patient will demonstrate improved ability to recall information by immediately recalling a series of words  21: RBANS re-assess score of 78 places pt in the Borderline range, down from previous re-assessment.  20: Immediate recall of picture scene: pt was 70% accurate.  20: Immediate recall of a picture scene and pt was 70% accurate with min cues. 20: RBANS Immediate recall  score: Pt improved to an 81 from a 73. 10/15/20: Immediate recall of visuospatial Constructional task and pt was 80% accurate with min cues. 10/1/20: ROMIE completed this date and pt did exhibit at least one significant error. 9/17/20: Immediate recall of word placement for 4 words: pt was 100% accurate. Hmwk for 5 words.  9/3/20: 4 related words from homework: pt was 2/4 I'ly. Moved to un-related pictures for visual ease and pt was 2/4. 8/27/20: 4 related words and pt was 1/4 and improved to 3/4 and 4/4 with mneumonic device.   -HG     Patient’s memory skills will be enhanced as reported by patient by utilizing internal memory strategies to recall up to 3 pieces of information after a 5- minute delay  80%:;with cues  -HG     Status: Patient’s memory skills will be enhanced as reported by patient by utilizing internal memory strategies to recall up to 3 pieces of information after a 5- minute delay  Progressing as expected  -HG     Comments: Patient’s memory skills will be enhanced as reported by patient by utilizing internal memory strategies to recall up to 3 pieces of information after a 5- minute delay  1/21/21: RBANS re-assess score of Delayed  recall is at 60, Extremely low range, down from last re-assessment by 19 points.  12/17/20: Delayed recall of 5 un-related words: pt was 3/5 with use of visual prompts.  12/10/20: Delayed recall of 4 un-related words from homework and pt had no studied them and SLP reviewed and with a delay, pt was 3/4 consistently. 12/3/20: Delayed recall for 3 un-related and pt was 2/3 x 2 with review and with use of a sentence, pt was 3/3. 11/19/20: Delayed recall of 3 un-related words: pt was 2/3 with cues x 4. 11/12/20: Delayed recall of 8 related pictures from homework: pt was 8/8 x 2. 11/5/20: RBANS Delayed recall: pt was improved to a 79 from a 60.  10/29/20: Delayed recall of 6 related pictures: pt was 4/6. With use of first letter initials, pt was 6/6 accurate. 7th word  "added: pt was 7/7 x 3.   10/22/20: Delayed recall of 5 related pictures and pt was 5/5, 4/5, 5/5 x 2.  Added a sixth picture and pt was 5/6 . 10/15/20: Delayed recall of 4 related picture relative to pt and pt was 3/4 x 3. 10/1/20: FROMAJE: Delayed recall also exhibited at least one significant error. 9/17/20: 4 related words from homework and pt was 1/4- changed to 4 words that pertain to pt and he was 3/4.  9/10/20: 4 un-related pictures from homework: pt was 3/4.  2/4- moved to 4 related pictures and pt was 0/4 I'ly. With cues, pt was 3/4.   -HG     Patient’s memory skills will be enhanced as reported by patient by using external memory aides  80%:;with cues  -HG     Status: Patient’s memory skills will be enhanced as reported by patient by using external memory aides  Progressing as expected  -HG     Comments: Patient’s memory skills will be enhanced as reported by patient by using external memory aides  1/21/21: Education with pt regarding importance of journaling whether on phone (shown lavon and strated an entry for this date) or in a paper journal due to low Delayed recall scores. 12/17/20: Pt reports writing in journal daily but did not return with journal to session. 12/3/20: Pt reports writing in journal every other day. 11/19/20: Pt states \"a little bit.\" 11/12/20: Education with pt for use of journal and bringing it to next therapy session.  11/5/20: Education and expectation of using his journal daily and returning to therapy with it next week. 10/29/20: cont'd education for use of daily journal. Provided a visual prompt.   10/15/20: Pt reports writing a little- pt states that he doesn't write very well.  10/1/20: Significant education with pt and wife regarding use of journal and/or notes lavon on phone. 9/17/20: Pt writing in journal but not in great detail and not consistent- education ongoing.  9/10/20: Pt stated that he has written in his journal a little. Provided visual prompts to remind pt to write " "in his journal. 9/3/20: Pt states that he journaling \"a little\". 8/27/20: Education for use of journal and the need for his delayed recall.   -HG     Patient will demonstrate improved ability to recall information by stating activities patient has completed that day  80%:;with cues  -HG     Status: Patient will demonstrate improved ability to recall information by stating activities patient has completed that day  Progressing as expected  -HG     Comments: Patient will demonstrate improved ability to recall information by stating activities patient has completed that day  1/21/21: Pt required cueing for his confusion of a morning and that he missed his first appt and was re-scheduled.  11/12/20: Pt states that he does the same thing every day. 10/22/20: When asked about his weekend, he was able to provide general information, not specifics of places or things seen. 10/1/20: Thorough education with pt and wife regarding use of journal. Pt admitted having to admit to his deficits. 8/27/20: Education for recall of what he has done throughout the day. Pt states that he never really worried about these things, \"That's why I had 2 secretaries.\"   -HG        Attention/Orientation Goals    Patient will be able to execute all activities necessary to manage a home  Independently;With Cues  -HG     Status: Patient will be able to execute all activities necessary to manage a home  Progressing as expected  -HG     Comments: Patient will be able to execute all activities necessary to manage a home  1/21/21: RBANS re-assess Attention score of 91 is down from 103. 11/5/20: RBANS Attention Score: Improved to a 103 from an 88. 10/1/20: ROMIE for attention and pt exhibited fast processing with minimal errors.   -HG     Patient will improve attention skills by sustaining consistent behavioral response during continuous and repetitive activity (e.g., listening for target words, auditory/reading comprehension tasks)  with cues;10 " minute task  -HG     Status: Patient will improve attention skills by sustaining consistent behavioral response during continuous and repetitive activity (e.g., listening for target words, auditory/reading comprehension tasks)  Progressing as expected  -HG     Comments: Patient will improve attention skills by sustaining consistent behavioral response during continuous and repetitive activity (e.g., listening for target words, auditory/reading comprehension tasks)  12/10/20: APT TEST: Pt scored above average for all areas except Divided attention and that was below average. Pt very concerned it can't remember more than he does. 10/22/20: Recognition of cards presented quickly: pt was nearly 100% accurate. 9/10/20: Card ID for a card that is one less than one before it:   -HG     Patient will improve attention skills by sustaining focus in order to actively hold and manipulate information provided (e.g., sequencing auditorily presented number series in ascending or descending order)  80%:;with cues  -HG     Status: Patient will improve attention skills by sustaining focus in order to actively hold and manipulate information provided (e.g., sequencing auditorily presented number series in ascending or descending order)  Progressing as expected  -HG     Comments: Patient will improve attention skills by sustaining focus in order to actively hold and manipulate information provided (e.g., sequencing auditorily presented number series in ascending or descending order)  12/3/20: Mental Manipulation for word progression: pt was 90% accurate.  10/22/20: Digit Span: pt continues to be successful with 6 digits. 7 digits: 6/7. 9/3/20: Digit Span: pt was able to recall up to 6 digits.   -HG     Patient will improve attention skills by alternating or shifting focus between two different tasks in order to complete both tasks  80%:;with cues  -HG     Status: Patient will improve attention skills by alternating or shifting focus  between two different tasks in order to complete both tasks  Progressing as expected  -HG     Comments: Patient will improve attention skills by alternating or shifting focus between two different tasks in order to complete both tasks  12/10/20: Card Sorting by N and pt was less impulsive and used his own strategy for sorting and pt was 80% accurate. 11/12/20: Card sorting by N and suit: pt was 80-90% accurate with 2 run throughs.  10/22/20: Alternating attention for cards with N and into their suit and pt was 80% accurate. 9/3/20: Alternating attention for cards with N and sorting into suit and pt was 50% accurate.   -HG     Patient will improve attention skills by dividing focus and responding simultaneously to multiple tasks or in order to complete task  80%:;with cues  -HG     Status: Patient will improve attention skills by dividing focus and responding simultaneously to multiple tasks or in order to complete task  Progressing as expected  -HG     Comments: Patient will improve attention skills by dividing focus and responding simultaneously to multiple tasks or in order to complete task  12/3/20: Alternating attention for visual scanning and pt's impulsivity plays ar role but pt improved to 90% accurate.  10/29/20: Card sorting based on order per suit and high to low. 9/10/20: Divided attention for card sorting according to suit and specific date: pt was 90% accurate.   -HG     Patient will improve attention skills by sustaining focus to high-level cognitive tasks in order to complete task  80%:;with cues  -HG     Status: Patient will improve attention skills by sustaining focus to high-level cognitive tasks in order to complete task  Progressing as expected  -HG     Comments: Patient will improve attention skills by sustaining focus to high-level cognitive tasks in order to complete task  12/3/20: One Pile Card Game: pt was 80% accurate with mod cues. Pt continues to be impulsive when playing Independently  and was 70% accurate.  11/19/20: One Pile Card Game: pt was impulsive and required mod cues and was 80% accurate.  10/15/20: One Pile Card Game: reviewed before play: pt was 80%accurate. 8/27/20: One Pile Card Game: Pt was 75% accurate.   -HG        Other Goals    Other Adult Goal- 1  Pt will improve RBANS Total to at least a 90 placing pt in the  Average range.   -HG     Status: Other Adult Goal- 1  Progressing as expected;Revised  -     Comments: Other Adult Goal- 1  1/21/21: RBANS Total score of 74 is down from 82 at time of last re-assessment.  11/5/30: RBANS Total of 82 improved from a 77. 10/1/20: FROMAJE: Pt score a 13- Stage 4.3. 9/10/20: RBANS not re-administered this date, pt given SLUMS and scored a 19/30.   -HG     Other Adult Goal- 2  Pt will complete high level reasoning tasks and problem solving with at least 80% accuracy.  -HG     Status: Other Adult Goal- 2  New;Progressing as expected  -HG     Comments: Other Adult Goal- 2  12/17/20: Language and reasoning for game and pt was 90% accurate.  10/22/20: Deduction puzzle: pt was 70% accurate.    -        SLP Time Calculation    SLP Goal Re-Cert Due Date  02/20/21  -       User Key  (r) = Recorded By, (t) = Taken By, (c) = Cosigned By    Initials Name Provider Type     Candy Rubin MS CCC-SLP Speech and Language Pathologist          OP SLP Education     Row Name 01/21/21 1100       Education    Barriers to Learning  No barriers identified  -    Education Provided  Described results of evaluation;Patient expressed understanding of evaluation;Patient participated in establishing goals and treatment plan;Patient demonstrated recommended strategies;Patient requires further education on strategies, risks  -    Assessed  Learning needs;Learning preferences;Learning motivation;Learning readiness  -    Learning Motivation  Strong  -    Learning Method  Explanation;Demonstration;Teach back;Written materials  -    Teaching Response   Verbalized understanding;Demonstrated understanding;Reinforcement needed  -HG    Education Comments  Education with pt regarding his need for external memory aides. Wife was called as wel.   -HG      User Key  (r) = Recorded By, (t) = Taken By, (c) = Cosigned By    Initials Name Effective Dates    Candy Bello, MS [facility]-SLP 08/09/20 -           OP SLP Assessment/Plan - 01/21/21 1100        SLP Assessment    Functional Problems  Speech Language- Adult/Cognition   -HG    Impact on Function: Adult Speech Language/Cognition  Trouble learning or remembering new information;Difficulty following directions;Lack of insight or awareness of deficits, safety issues;Restrictions in personal and social life   -HG    Clinical Impression: Speech Language-Adult/Congnition  Moderate:;Cognitive Communication Impairment   -HG    Functional Problems Comment  Pt's wife notes increased confusion. Pt missed his appt this date and as unaware of it and that it was re-scheduled for same day at a later time.   -HG    Clinical Impression Comments  RBANS re-assessment score of 74 places pt in the Borderline range.   -HG    Please refer to paper survey for additional self-reported information  Yes   -HG    Please refer to items scanned into chart for additional diagnostic informaiton and handouts as provided by clinician  Yes   -HG    SLP Diagnosis  Moderate cog/comm deficits   -HG    Prognosis  Excellent (comment)   -HG    Patient/caregiver participated in establishment of treatment plan and goals  Yes   -HG    Patient would benefit from skilled therapy intervention  Yes   -HG       SLP Plan    Frequency  1-2x/week   -HG    Duration  8 weeks   -HG    Planned CPT's?  SLP INDIVIDUAL SPEECH THERAPY: 98451   -HG    Expected Duration of Therapy Session (SLP Eval)  60   -HG    Plan Comments  Cont with cog tx.   -HG      User Key  (r) = Recorded By, (t) = Taken By, (c) = Cosigned By    Initials Name Provider Type    ALYSON CanisCandy MS  CCC-SLP Speech and Language Pathologist                 Time Calculation:   SLP Start Time: 1100    Therapy Charges for Today     Code Description Service Date Service Provider Modifiers Qty    [id number]  ST TREATMENT SPEECH 4 1/21/2021 Candy Rubin, MS CCC-SLP GN 1                   Candy Rubin, MS RADHA-SLP  1/21/2021

## 2021-01-31 ENCOUNTER — APPOINTMENT (OUTPATIENT)
Dept: PREADMISSION TESTING | Facility: HOSPITAL | Age: 76
End: 2021-01-31

## 2021-01-31 PROCEDURE — U0004 COV-19 TEST NON-CDC HGH THRU: HCPCS

## 2021-01-31 PROCEDURE — C9803 HOPD COVID-19 SPEC COLLECT: HCPCS

## 2021-02-01 LAB — SARS-COV-2 RNA RESP QL NAA+PROBE: NOT DETECTED

## 2021-02-02 ENCOUNTER — OUTSIDE FACILITY SERVICE (OUTPATIENT)
Dept: GASTROENTEROLOGY | Facility: CLINIC | Age: 76
End: 2021-02-02

## 2021-02-02 PROCEDURE — 88305 TISSUE EXAM BY PATHOLOGIST: CPT | Performed by: INTERNAL MEDICINE

## 2021-02-02 PROCEDURE — 45385 COLONOSCOPY W/LESION REMOVAL: CPT | Performed by: INTERNAL MEDICINE

## 2021-02-03 ENCOUNTER — LAB REQUISITION (OUTPATIENT)
Dept: LAB | Facility: HOSPITAL | Age: 76
End: 2021-02-03

## 2021-02-03 DIAGNOSIS — Z12.11 ENCOUNTER FOR SCREENING FOR MALIGNANT NEOPLASM OF COLON: ICD-10-CM

## 2021-02-04 ENCOUNTER — HOSPITAL ENCOUNTER (OUTPATIENT)
Dept: SPEECH THERAPY | Facility: HOSPITAL | Age: 76
Setting detail: THERAPIES SERIES
Discharge: HOME | End: 2021-02-04

## 2021-02-04 DIAGNOSIS — G31.84 MILD COGNITIVE IMPAIRMENT: Primary | ICD-10-CM

## 2021-02-04 LAB
CYTO UR: NORMAL
LAB AP CASE REPORT: NORMAL
LAB AP CLINICAL INFORMATION: NORMAL
PATH REPORT.FINAL DX SPEC: NORMAL
PATH REPORT.GROSS SPEC: NORMAL

## 2021-02-04 PROCEDURE — 92507 TX SP LANG VOICE COMM INDIV: CPT

## 2021-02-04 NOTE — THERAPY TREATMENT NOTE
Outpatient Speech Language Pathology   Adult Speech Language Cognitive Treatment Note  [facility]     Patient Name: Aj Marie  : 1945  MRN: [id number]  Today's Date: 2021         Visit Date: 2021   Patient Active Problem List   Diagnosis   • First degree atrioventricular block   • OA (osteoarthritis)   • Hearing loss   • Essential hypertension   • Candidate for statin therapy due to risk of future cardiovascular event   • Atypical chest pain   • Mild cognitive impairment          Visit Dx:    ICD-10-CM ICD-9-CM   1. Mild cognitive impairment  G31.84 331.83                         SLP OP Goals     Row Name 21 0900          Goal Type Needed    Goal Type Needed  Memory;Attention/Orientation;Other Adult Goals  -HG        Subjective Comments    Subjective Comments  Pt alert, cooperative, didn't remember to bring in his journal. Much education regarding the need for external memory aides. Pt admitted that he never knows where he parked the car and takes that for granted.   -HG        Memory Goals    Patient will be able to remember information needed to return to work and function on work-related tasks  90%:;with intermittent cues  -HG     Status: Patient will be able to remember information needed to return to work and function on work-related tasks  New  -HG     Patient will demonstrate improved ability to recall information by immediately recalling a series of words  80%:;related;after delay;with cues  -HG     Status: Patient will demonstrate improved ability to recall information by immediately recalling a series of words  Progressing as expected  -HG     Comments: Patient will demonstrate improved ability to recall information by immediately recalling a series of words  21: RBANS re-assess score of 78 places pt in the Borderline range, down from previous re-assessment.  20: Immediate recall of picture scene: pt was 70% accurate.  20: Immediate recall of a picture  scene and pt was 70% accurate with min cues. 11/5/20: RBANS Immediate recall score: Pt improved to an 81 from a 73. 10/15/20: Immediate recall of visuospatial Constructional task and pt was 80% accurate with min cues. 10/1/20: ROMIE completed this date and pt did exhibit at least one significant error. 9/17/20: Immediate recall of word placement for 4 words: pt was 100% accurate. Hmwk for 5 words.  9/3/20: 4 related words from homework: pt was 2/4 I'ly. Moved to un-related pictures for visual ease and pt was 2/4. 8/27/20: 4 related words and pt was 1/4 and improved to 3/4 and 4/4 with mneumonic device.   -HG     Patient’s memory skills will be enhanced as reported by patient by utilizing internal memory strategies to recall up to 3 pieces of information after a 5- minute delay  80%:;with cues  -HG     Status: Patient’s memory skills will be enhanced as reported by patient by utilizing internal memory strategies to recall up to 3 pieces of information after a 5- minute delay  Progressing as expected  -HG     Comments: Patient’s memory skills will be enhanced as reported by patient by utilizing internal memory strategies to recall up to 3 pieces of information after a 5- minute delay  2/4/21: 5 un-related words: pt was 3/5 consistently even with use of picture and sentence. Words given for homework.   Pt did use a written strategy to remember this date where he parked but couldn't recall for sure if he wrote it or not. 1/21/21: RBANS re-assess score of Delayed  recall is at 60, Extremely low range, down from last re-assessment by 19 points.  12/17/20: Delayed recall of 5 un-related words: pt was 3/5 with use of visual prompts.  12/10/20: Delayed recall of 4 un-related words from homework and pt had no studied them and SLP reviewed and with a delay, pt was 3/4 consistently. 12/3/20: Delayed recall for 3 un-related and pt was 2/3 x 2 with review and with use of a sentence, pt was 3/3. 11/19/20: Delayed recall of 3  "un-related words: pt was 2/3 with cues x 4. 11/12/20: Delayed recall of 8 related pictures from homework: pt was 8/8 x 2. 11/5/20: RBANS Delayed recall: pt was improved to a 79 from a 60.  10/29/20: Delayed recall of 6 related pictures: pt was 4/6. With use of first letter initials, pt was 6/6 accurate. 7th word added: pt was 7/7 x 3.   10/22/20: Delayed recall of 5 related pictures and pt was 5/5, 4/5, 5/5 x 2.  Added a sixth picture and pt was 5/6 . 10/15/20: Delayed recall of 4 related picture relative to pt and pt was 3/4 x 3. 10/1/20: FROMAJE: Delayed recall also exhibited at least one significant error. 9/17/20: 4 related words from homework and pt was 1/4- changed to 4 words that pertain to pt and he was 3/4.  9/10/20: 4 un-related pictures from homework: pt was 3/4.  2/4- moved to 4 related pictures and pt was 0/4 I'ly. With cues, pt was 3/4.   -HG     Patient’s memory skills will be enhanced as reported by patient by using external memory aides  80%:;with cues  -HG     Status: Patient’s memory skills will be enhanced as reported by patient by using external memory aides  Progressing as expected  -HG     Comments: Patient’s memory skills will be enhanced as reported by patient by using external memory aides  2/4/21:Increased education and reasoning for use of an external memory aide. Provided visual prompt to return with memory book next session. 1/21/21: Education with pt regarding importance of journaling whether on phone (shown lavon and strated an entry for this date) or in a paper journal due to low Delayed recall scores. 12/17/20: Pt reports writing in journal daily but did not return with journal to session. 12/3/20: Pt reports writing in journal every other day. 11/19/20: Pt states \"a little bit.\" 11/12/20: Education with pt for use of journal and bringing it to next therapy session.  11/5/20: Education and expectation of using his journal daily and returning to therapy with it next week. 10/29/20: " "cont'd education for use of daily journal. Provided a visual prompt.   10/15/20: Pt reports writing a little- pt states that he doesn't write very well.  10/1/20: Significant education with pt and wife regarding use of journal and/or notes lavon on phone. 9/17/20: Pt writing in journal but not in great detail and not consistent- education ongoing.  9/10/20: Pt stated that he has written in his journal a little. Provided visual prompts to remind pt to write in his journal. 9/3/20: Pt states that he journaling \"a little\". 8/27/20: Education for use of journal and the need for his delayed recall.   -HG     Patient will demonstrate improved ability to recall information by stating activities patient has completed that day  80%:;with cues  -HG     Status: Patient will demonstrate improved ability to recall information by stating activities patient has completed that day  Progressing as expected  -HG     Comments: Patient will demonstrate improved ability to recall information by stating activities patient has completed that day  1/21/21: Pt required cueing for his confusion of a morning and that he missed his first appt and was re-scheduled.  11/12/20: Pt states that he does the same thing every day. 10/22/20: When asked about his weekend, he was able to provide general information, not specifics of places or things seen. 10/1/20: Thorough education with pt and wife regarding use of journal. Pt admitted having to admit to his deficits. 8/27/20: Education for recall of what he has done throughout the day. Pt states that he never really worried about these things, \"That's why I had 2 secretaries.\"   -HG        Attention/Orientation Goals    Patient will be able to execute all activities necessary to manage a home  Independently;With Cues  -HG     Status: Patient will be able to execute all activities necessary to manage a home  Progressing as expected  -HG     Comments: Patient will be able to execute all activities " necessary to manage a home  1/21/21: RBANS re-assess Attention score of 91 is down from 103. 11/5/20: RBANS Attention Score: Improved to a 103 from an 88. 10/1/20: FROMAJE for attention and pt exhibited fast processing with minimal errors.   -HG     Patient will improve attention skills by sustaining consistent behavioral response during continuous and repetitive activity (e.g., listening for target words, auditory/reading comprehension tasks)  with cues;10 minute task  -HG     Status: Patient will improve attention skills by sustaining consistent behavioral response during continuous and repetitive activity (e.g., listening for target words, auditory/reading comprehension tasks)  Progressing as expected  -HG     Comments: Patient will improve attention skills by sustaining consistent behavioral response during continuous and repetitive activity (e.g., listening for target words, auditory/reading comprehension tasks)  12/10/20: APT TEST: Pt scored above average for all areas except Divided attention and that was below average. Pt very concerned it can't remember more than he does. 10/22/20: Recognition of cards presented quickly: pt was nearly 100% accurate. 9/10/20: Card ID for a card that is one less than one before it:   -HG     Patient will improve attention skills by sustaining focus in order to actively hold and manipulate information provided (e.g., sequencing auditorily presented number series in ascending or descending order)  80%:;with cues  -HG     Status: Patient will improve attention skills by sustaining focus in order to actively hold and manipulate information provided (e.g., sequencing auditorily presented number series in ascending or descending order)  Progressing as expected  -HG     Comments: Patient will improve attention skills by sustaining focus in order to actively hold and manipulate information provided (e.g., sequencing auditorily presented number series in ascending or descending order)   12/3/20: Mental Manipulation for word progression: pt was 90% accurate.  10/22/20: Digit Span: pt continues to be successful with 6 digits. 7 digits: 6/7. 9/3/20: Digit Span: pt was able to recall up to 6 digits.   -HG     Patient will improve attention skills by alternating or shifting focus between two different tasks in order to complete both tasks  80%:;with cues  -HG     Status: Patient will improve attention skills by alternating or shifting focus between two different tasks in order to complete both tasks  Progressing as expected  -HG     Comments: Patient will improve attention skills by alternating or shifting focus between two different tasks in order to complete both tasks  12/10/20: Card Sorting by N and pt was less impulsive and used his own strategy for sorting and pt was 80% accurate. 11/12/20: Card sorting by N and suit: pt was 80-90% accurate with 2 run throughs.  10/22/20: Alternating attention for cards with N and into their suit and pt was 80% accurate. 9/3/20: Alternating attention for cards with N and sorting into suit and pt was 50% accurate.   -HG     Patient will improve attention skills by dividing focus and responding simultaneously to multiple tasks or in order to complete task  80%:;with cues  -HG     Status: Patient will improve attention skills by dividing focus and responding simultaneously to multiple tasks or in order to complete task  Progressing as expected  -HG     Comments: Patient will improve attention skills by dividing focus and responding simultaneously to multiple tasks or in order to complete task  12/3/20: Alternating attention for visual scanning and pt's impulsivity plays ar role but pt improved to 90% accurate.  10/29/20: Card sorting based on order per suit and high to low. 9/10/20: Divided attention for card sorting according to suit and specific date: pt was 90% accurate.   -HG     Patient will improve attention skills by sustaining focus to high-level cognitive  tasks in order to complete task  80%:;with cues  -HG     Status: Patient will improve attention skills by sustaining focus to high-level cognitive tasks in order to complete task  Progressing as expected  -HG     Comments: Patient will improve attention skills by sustaining focus to high-level cognitive tasks in order to complete task  2/4/21: Clock drawing and setting clock to accurate time: pt struggled initially with making the hour and minute hand the same length and with further instruction and cues, pt was 12/3/20: One Pile Card Game: pt was 80% accurate with mod cues. Pt continues to be impulsive when playing Independently and was 70% accurate.  11/19/20: One Pile Card Game: pt was impulsive and required mod cues and was 80% accurate.  10/15/20: One Pile Card Game: reviewed before play: pt was 80%accurate. 8/27/20: One Pile Card Game: Pt was 75% accurate.   -HG        Other Goals    Other Adult Goal- 1  Pt will improve RBANS Total to at least a 90 placing pt in the  Average range.   -HG     Status: Other Adult Goal- 1  Progressing as expected;Revised  -HG     Comments: Other Adult Goal- 1  1/21/21: RBANS Total score of 74 is down from 82 at time of last re-assessment.  11/5/30: RBANS Total of 82 improved from a 77. 10/1/20: FROMAJE: Pt score a 13- Stage 4.3. 9/10/20: RBANS not re-administered this date, pt given SLUMS and scored a 19/30.   -HG     Other Adult Goal- 2  Pt will complete high level reasoning tasks and problem solving with at least 80% accuracy.  -HG     Status: Other Adult Goal- 2  New;Progressing as expected  -HG     Comments: Other Adult Goal- 2  12/17/20: Language and reasoning for game and pt was 90% accurate.  10/22/20: Deduction puzzle: pt was 70% accurate.    -        SLP Time Calculation    SLP Goal Re-Cert Due Date  02/20/21  -HG       User Key  (r) = Recorded By, (t) = Taken By, (c) = Cosigned By    Initials Name Provider Type    Candy Bello MS CCC-SLP Speech and Language  Pathologist          OP SLP Education     Row Name 02/04/21 1000       Education    Education Comments  --  -HG    Row Name 02/04/21 0900       Education    Education Comments  Hmwk for 5 un-related words. Education for use of journal for things he's completed not just to use as a To Do List.   -HG      User Key  (r) = Recorded By, (t) = Taken By, (c) = Cosigned By    Initials Name Effective Dates    Candy Bello MS CCC-SLP 08/09/20 -           OP SLP Assessment/Plan - 02/04/21 1000        SLP Plan    Plan Comments  --   -HG      User Key  (r) = Recorded By, (t) = Taken By, (c) = Cosigned By    Initials Name Provider Type    Candy Bello MS CCC-SLP Speech and Language Pathologist                 Time Calculation:   SLP Start Time: 0900    Therapy Charges for Today     Code Description Service Date Service Provider Modifiers Qty    [id number]  ST TREATMENT SPEECH 4 2/4/2021 Candy Rubin MS CCC-SLP GN 1                   Candy Rubin MS CCC-SLP  2/4/2021

## 2021-02-11 ENCOUNTER — APPOINTMENT (OUTPATIENT)
Dept: SPEECH THERAPY | Facility: HOSPITAL | Age: 76
End: 2021-02-11

## 2021-02-18 ENCOUNTER — APPOINTMENT (OUTPATIENT)
Dept: SPEECH THERAPY | Facility: HOSPITAL | Age: 76
End: 2021-02-18

## 2021-02-24 ENCOUNTER — OFFICE VISIT (OUTPATIENT)
Dept: NEUROLOGY | Facility: CLINIC | Age: 76
End: 2021-02-24

## 2021-02-24 VITALS
OXYGEN SATURATION: 99 % | TEMPERATURE: 98 F | SYSTOLIC BLOOD PRESSURE: 140 MMHG | HEART RATE: 53 BPM | DIASTOLIC BLOOD PRESSURE: 82 MMHG | BODY MASS INDEX: 30.86 KG/M2 | HEIGHT: 66 IN | WEIGHT: 192 LBS

## 2021-02-24 DIAGNOSIS — G31.84 MILD COGNITIVE IMPAIRMENT: Primary | ICD-10-CM

## 2021-02-24 PROCEDURE — 99214 OFFICE O/P EST MOD 30 MIN: CPT | Performed by: NURSE PRACTITIONER

## 2021-02-24 RX ORDER — DONEPEZIL HYDROCHLORIDE 10 MG/1
10 TABLET, FILM COATED ORAL NIGHTLY
Qty: 90 TABLET | Refills: 3 | Status: SHIPPED | OUTPATIENT
Start: 2021-02-24 | End: 2021-08-25

## 2021-02-24 NOTE — PROGRESS NOTES
Subjective:     Patient ID: Aj Marie is a 75 y.o. male.    CC:   Chief Complaint   Patient presents with   • Memory Loss       HPI:   History of Present Illness   Aj Marie is a 75 y.o. male who presents for 6 month follow up on memory impairment.   He is accompanied by his wife during today's visit.  When he initially saw Dr. Robledo in July 2020 they started him on donepezil 5 mg daily.  He has tolerated the medication well.  His wife tells me that he was not consistently taking this over the past 3 months she has started giving him his medication and she has seen no regression.  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  Since that time the wife tells me that with communication from Mr. Marie's manager they have kept him driving locally in [place] alone and his forgetfulness has significantly improved.  His wife does pay the bills.  He is able to complete toileting and bathing and cook and feed himself.  He is able to follow directions.  He does have 3 years of college education.  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  His wife has been concerned about his driving but has felt more comfortable with local work.  He admits that his memory is not great.  He does feel like overall that he is functioning relatively well.  His family has noted symptoms since about 2019 marked initially by mild forgetfulness. However, his symptoms worsened significantly after a Whipple's procedure for a pancreatic cyst at  in 11/19 marked by a significant delirium. Though he improved initially, he has had a progressive cognitive decline since that time marked by impairments in memory, along with some generally associated confusion and disorientation.There are not identified modifying factors.  He has been going to [facility]  therapy for cognitive training and his slums score was a 19 out of 30 for them.  He has shown some progression and a would like to continue with the services.  His wife tells me that PCP did want to start him on memantine as well but they are not ready to start this medication at this time.  His wife feels that she has a good relationship with the hertz  and if there are additional concerns she can notify us.     Prior evaluation has included a normal MRI of the brain 2019 showing minimal generalized atrophy and 2 punctate chronic small vessel ischemic changes noted and reviewed imaging of today again in clinic with the patient and wife on 2/24/2021.  And screening blood work with PCP normal B12, folate and RPR was negative.    The following portions of the patient's history were reviewed and updated as appropriate: allergies, current medications, past family history, past medical history, past social history, past surgical history and problem list.    Past Medical History:   Diagnosis Date   • Chest pain     Chest pain/dyspnea: Considering anginal equivalent and his risk factors, we will evaluate with a stress echocardiogram at his convenience and send a letter with the results and further recommendations.   • Dyslipidemia     November 2015:  Total cholesterol 186, triglycerides 147, HDL 37, .ASCVD 41.9%.   • First degree atrioventricular block     Chronicity unknown at this time, possibly first told 20 years ago.    • Hearing loss    • Hyperlipidemia    • Hypertension    • OA (osteoarthritis)        Past Surgical History:   Procedure Laterality Date   • HERNIA REPAIR     • OTHER SURGICAL HISTORY      Remote skull surgery.   • POLYPECTOMY         Social History     Socioeconomic History   • Marital status:      Spouse name: Not on file   • Number of children: Not on file   • Years of education: Not on file   • Highest education level: Not on file   Tobacco Use   • Smoking status:  "Former Smoker     Quit date:      Years since quittin.1   • Smokeless tobacco: Never Used   • Tobacco comment: 3/11/2016:He quit smoking 20 years ago and was smoking 1 to 2 packs per day for 15-20 years before that.    Substance and Sexual Activity   • Alcohol use: No   • Drug use: No   • Sexual activity: Defer       Family History   Problem Relation Age of Onset   • Heart disease Mother    • Emphysema Mother    • Dementia Father    • No Known Problems Sister    • No Known Problems Sister         Review of Systems   Constitutional: Negative for chills, fatigue, fever and unexpected weight change.   HENT: Negative for ear pain, hearing loss, nosebleeds, rhinorrhea and sore throat.    Eyes: Negative for photophobia, pain, discharge, itching and visual disturbance.   Respiratory: Negative for cough, chest tightness, shortness of breath and wheezing.    Cardiovascular: Negative for chest pain, palpitations and leg swelling.   Gastrointestinal: Negative for abdominal pain, blood in stool, constipation, diarrhea, nausea and vomiting.   Genitourinary: Negative for dysuria, frequency, hematuria and urgency.   Musculoskeletal: Negative for arthralgias, back pain, gait problem, joint swelling, myalgias, neck pain and neck stiffness.   Skin: Negative for rash and wound.   Allergic/Immunologic: Negative for environmental allergies and food allergies.   Neurological: Negative for dizziness, tremors, seizures, syncope, speech difficulty, weakness, light-headedness, numbness and headaches.   Hematological: Negative for adenopathy. Does not bruise/bleed easily.   Psychiatric/Behavioral: Negative for agitation, confusion, decreased concentration, hallucinations, sleep disturbance and suicidal ideas. The patient is not nervous/anxious.    All other systems reviewed and are negative.       Objective:  /82   Pulse 53   Temp 98 °F (36.7 °C)   Ht 167.6 cm (66\")   Wt 87.1 kg (192 lb)   SpO2 99%   BMI 30.99 kg/m² "     Neurologic Exam     Mental Status   Oriented to person.   Oriented to place.   Disoriented to time.   Registration: recalls 3 of 3 objects. Recall at 5 minutes: recalls 2 of 3 objects.   Speech: speech is normal   Level of consciousness: alert    Cranial Nerves   Cranial nerves II through XII intact.     Motor Exam   Muscle bulk: normal  Overall muscle tone: normal    Strength   Strength 5/5 throughout.     Gait, Coordination, and Reflexes     Gait  Gait: normal    Coordination   Finger to nose coordination: normal    Tremor   Resting tremor: absent  Intention tremor: absent  Action tremor: absent    Reflexes   Right brachioradialis: 2+  Left brachioradialis: 2+  Right biceps: 2+  Left biceps: 2+  Right patellar: 2+  Left patellar: 2+  Right achilles: 2+  Left achilles: 2+  Right : 2+  Left : 2+      Physical Exam  Constitutional:       Appearance: Normal appearance.   Neurological:      Mental Status: He is alert.      Coordination: Finger-Nose-Finger Test normal.      Gait: Gait is intact.      Deep Tendon Reflexes: Strength normal.      Reflex Scores:       Bicep reflexes are 2+ on the right side and 2+ on the left side.       Brachioradialis reflexes are 2+ on the right side and 2+ on the left side.       Patellar reflexes are 2+ on the right side and 2+ on the left side.       Achilles reflexes are 2+ on the right side and 2+ on the left side.  Psychiatric:         Attention and Perception: Attention and perception normal.         Mood and Affect: Mood and affect normal.         Speech: Speech normal.         Behavior: Behavior normal.         Thought Content: Thought content normal.         Cognition and Memory: He exhibits impaired recent memory.         Judgment: Judgment normal.       MMSE 7/2020 23/30   MMSE 2/24/21 25/30    Assessment/Plan:       Diagnoses and all orders for this visit:    1. Mild cognitive impairment (Primary)  -     donepezil (ARICEPT) 10 MG tablet; Take 1 tablet by mouth  Every Night.  Dispense: 90 tablet; Refill: 3  -     NeuroPsych Testing  -     Ambulatory Referral to Speech Therapy           Total time of encounter today including review of prior work-up and notes as well as today's visit was 30 minutes. We will increase his donepezil to 10 mg.  We will continue speech therapy cognitive training services with [facility].  We will also refer him to  neuropsych testing for evaluation of mild cognitive impairment versus early/mild Alzheimer's disease.  I have explained that this can take up to 6 months to get an appointment.  For now he will continue to work and drive locally.  If his work or wife have any additional concerns they can call us.  I will also have our  Melvina Ordonez reach out to his wife with any additional concerns or follow-up.  We are going to follow-up in clinic in 6 months or sooner if needed.    Reviewed medications, potential side effects and signs and symptoms to report. Discussed risk versus benefits of treatment plan with patient and/or family-including medications, labs and radiology that may be ordered. Addressed questions and concerns during visit. Patient and/or family verbalized understanding and agree with plan.    AS THE PROVIDER, I PERSONALLY WORE PPE DURING ENTIRE FACE TO FACE ENCOUNTER IN CLINIC WITH THE PATIENT. PATIENT ALSO WORE PPE DURING ENTIRE FACE TO FACE ENCOUNTER EXCEPT FOR A MAX OF 30 SECONDS DURING NEUROLOGICAL EVALUATION OF CRANIAL NERVES AND THEN MASK WAS PLACED BACK OVER PATIENT FACE FOR REMAINDER OF VISIT. I WASHED MY HANDS BEFORE AND AFTER VISIT.    During this visit the following were done:  Labs Reviewed [x]    Labs Ordered []    Radiology Reports Reviewed [x]    Radiology Ordered []    PCP Records Reviewed []    Referring Provider Records Reviewed []    ER Records Reviewed []    Hospital Records Reviewed []    History Obtained From Family []    Radiology Images Reviewed [x]    Other Reviewed []    Records  Requested []      Gisell Marlow, APRN  2/24/2021

## 2021-02-25 ENCOUNTER — TELEPHONE (OUTPATIENT)
Dept: NEUROLOGY | Facility: CLINIC | Age: 76
End: 2021-02-25

## 2021-02-25 ENCOUNTER — HOSPITAL ENCOUNTER (OUTPATIENT)
Dept: SPEECH THERAPY | Facility: HOSPITAL | Age: 76
Setting detail: THERAPIES SERIES
Discharge: HOME | End: 2021-02-25

## 2021-02-25 DIAGNOSIS — G31.84 MILD COGNITIVE IMPAIRMENT: Primary | ICD-10-CM

## 2021-02-25 PROCEDURE — 92507 TX SP LANG VOICE COMM INDIV: CPT

## 2021-02-25 NOTE — TELEPHONE ENCOUNTER
Phone call to talk with wife,  answered. I asked how visit with Gisell went yesterday and he didn't recall being here until after several prompts. Said it went well, no questions. Wife not available as he said he'll ask her and have her call me. I emailed her asking if she needed any additional support or education. Look forward to talking with her.

## 2021-02-25 NOTE — THERAPY PROGRESS REPORT/RE-CERT
Outpatient Speech Language Pathology   Adult Speech Language Cognitive Progress Note  [facility]     Patient Name: Aj Marie  : 1945  MRN: [id number]  Today's Date: 2021         Visit Date: 2021   Patient Active Problem List   Diagnosis   • First degree atrioventricular block   • OA (osteoarthritis)   • Hearing loss   • Essential hypertension   • Candidate for statin therapy due to risk of future cardiovascular event   • Atypical chest pain   • Mild cognitive impairment          Visit Dx:    ICD-10-CM ICD-9-CM   1. Mild cognitive impairment  G31.84 331.83                         SLP OP Goals     Row Name 21 0900          Goal Type Needed    Goal Type Needed  Memory;Attention/Orientation;Other Adult Goals  -HG        Subjective Comments    Subjective Comments  Pt alert, cooperative, did not return with his journal.   -HG        Memory Goals    Patient will be able to remember information needed to return to work and function on work-related tasks  90%:;with intermittent cues  -HG     Status: Patient will be able to remember information needed to return to work and function on work-related tasks  New  -HG     Patient will demonstrate improved ability to recall information by immediately recalling a series of words  80%:;related;after delay;with cues  -HG     Status: Patient will demonstrate improved ability to recall information by immediately recalling a series of words  Progressing as expected  -HG     Comments: Patient will demonstrate improved ability to recall information by immediately recalling a series of words  21: Immediate recall for shapes and figures and pt was 70% accurate I'ly. 21: RBANS re-assess score of 78 places pt in the Borderline range, down from previous re-assessment.  20: Immediate recall of picture scene: pt was 70% accurate.  20: Immediate recall of a picture scene and pt was 70% accurate with min cues. 20: RBANS Immediate recall  score: Pt improved to an 81 from a 73. 10/15/20: Immediate recall of visuospatial Constructional task and pt was 80% accurate with min cues. 10/1/20: ROMIE completed this date and pt did exhibit at least one significant error. 9/17/20: Immediate recall of word placement for 4 words: pt was 100% accurate. Hmwk for 5 words.  9/3/20: 4 related words from homework: pt was 2/4 I'ly. Moved to un-related pictures for visual ease and pt was 2/4. 8/27/20: 4 related words and pt was 1/4 and improved to 3/4 and 4/4 with mneumonic device.   -HG     Patient’s memory skills will be enhanced as reported by patient by utilizing internal memory strategies to recall up to 3 pieces of information after a 5- minute delay  80%:;with cues  -HG     Status: Patient’s memory skills will be enhanced as reported by patient by utilizing internal memory strategies to recall up to 3 pieces of information after a 5- minute delay  Progressing as expected  -HG     Comments: Patient’s memory skills will be enhanced as reported by patient by utilizing internal memory strategies to recall up to 3 pieces of information after a 5- minute delay  2/25/21: 5 un-related words from previous session and pt required use of a fill in sentence but I'ly, pt was 3/5.  2/4/21: 5 un-related words: pt was 3/5 consistently even with use of picture and sentence. Words given for homework.   Pt did use a written strategy to remember this date where he parked but couldn't recall for sure if he wrote it or not. 1/21/21: RBANS re-assess score of Delayed  recall is at 60, Extremely low range, down from last re-assessment by 19 points.  12/17/20: Delayed recall of 5 un-related words: pt was 3/5 with use of visual prompts.  12/10/20: Delayed recall of 4 un-related words from homework and pt had no studied them and SLP reviewed and with a delay, pt was 3/4 consistently. 12/3/20: Delayed recall for 3 un-related and pt was 2/3 x 2 with review and with use of a sentence, pt was  "3/3. 11/19/20: Delayed recall of 3 un-related words: pt was 2/3 with cues x 4. 11/12/20: Delayed recall of 8 related pictures from homework: pt was 8/8 x 2. 11/5/20: RBANS Delayed recall: pt was improved to a 79 from a 60.  10/29/20: Delayed recall of 6 related pictures: pt was 4/6. With use of first letter initials, pt was 6/6 accurate. 7th word added: pt was 7/7 x 3.   10/22/20: Delayed recall of 5 related pictures and pt was 5/5, 4/5, 5/5 x 2.  Added a sixth picture and pt was 5/6 . 10/15/20: Delayed recall of 4 related picture relative to pt and pt was 3/4 x 3. 10/1/20: FROMAJE: Delayed recall also exhibited at least one significant error. 9/17/20: 4 related words from homework and pt was 1/4- changed to 4 words that pertain to pt and he was 3/4.  9/10/20: 4 un-related pictures from homework: pt was 3/4.  2/4- moved to 4 related pictures and pt was 0/4 I'ly. With cues, pt was 3/4.   -HG     Patient’s memory skills will be enhanced as reported by patient by using external memory aides  80%:;with cues  -HG     Status: Patient’s memory skills will be enhanced as reported by patient by using external memory aides  Progressing as expected  -HG     Comments: Patient’s memory skills will be enhanced as reported by patient by using external memory aides  2/25/21: Pt given a small notepad for delayed recall to carry in his pocket since he is not writing consistently in his larger journal. 2/4/21:Increased education and reasoning for use of an external memory aide. Provided visual prompt to return with memory book next session. 1/21/21: Education with pt regarding importance of journaling whether on phone (shown lavon and strated an entry for this date) or in a paper journal due to low Delayed recall scores. 12/17/20: Pt reports writing in journal daily but did not return with journal to session. 12/3/20: Pt reports writing in journal every other day. 11/19/20: Pt states \"a little bit.\" 11/12/20: Education with pt for use " "of journal and bringing it to next therapy session.  11/5/20: Education and expectation of using his journal daily and returning to therapy with it next week. 10/29/20: cont'd education for use of daily journal. Provided a visual prompt.   10/15/20: Pt reports writing a little- pt states that he doesn't write very well.  10/1/20: Significant education with pt and wife regarding use of journal and/or notes lavon on phone. 9/17/20: Pt writing in journal but not in great detail and not consistent- education ongoing.  9/10/20: Pt stated that he has written in his journal a little. Provided visual prompts to remind pt to write in his journal. 9/3/20: Pt states that he journaling \"a little\". 8/27/20: Education for use of journal and the need for his delayed recall.   -HG     Patient will demonstrate improved ability to recall information by stating activities patient has completed that day  80%:;with cues  -HG     Status: Patient will demonstrate improved ability to recall information by stating activities patient has completed that day  Progressing as expected  -HG     Comments: Patient will demonstrate improved ability to recall information by stating activities patient has completed that day  2/25/21: Pt could not recall where he parked and brought it up multiple times and was bothered by it. SLP provided pt with a small steno pad and he wrote it down. 1/21/21: Pt required cueing for his confusion of a morning and that he missed his first appt and was re-scheduled.  11/12/20: Pt states that he does the same thing every day. 10/22/20: When asked about his weekend, he was able to provide general information, not specifics of places or things seen. 10/1/20: Thorough education with pt and wife regarding use of journal. Pt admitted having to admit to his deficits. 8/27/20: Education for recall of what he has done throughout the day. Pt states that he never really worried about these things, \"That's why I had 2 secretaries.\"   " -HG        Attention/Orientation Goals    Patient will be able to execute all activities necessary to manage a home  Independently;With Cues  -HG     Status: Patient will be able to execute all activities necessary to manage a home  Progressing as expected  -HG     Comments: Patient will be able to execute all activities necessary to manage a home  1/21/21: RBANS re-assess Attention score of 91 is down from 103. 11/5/20: RBANS Attention Score: Improved to a 103 from an 88. 10/1/20: FROMAJE for attention and pt exhibited fast processing with minimal errors.   -HG     Patient will improve attention skills by sustaining consistent behavioral response during continuous and repetitive activity (e.g., listening for target words, auditory/reading comprehension tasks)  with cues;10 minute task  -HG     Status: Patient will improve attention skills by sustaining consistent behavioral response during continuous and repetitive activity (e.g., listening for target words, auditory/reading comprehension tasks)  Progressing as expected  -HG     Comments: Patient will improve attention skills by sustaining consistent behavioral response during continuous and repetitive activity (e.g., listening for target words, auditory/reading comprehension tasks)  12/10/20: APT TEST: Pt scored above average for all areas except Divided attention and that was below average. Pt very concerned it can't remember more than he does. 10/22/20: Recognition of cards presented quickly: pt was nearly 100% accurate. 9/10/20: Card ID for a card that is one less than one before it:   -HG     Patient will improve attention skills by sustaining focus in order to actively hold and manipulate information provided (e.g., sequencing auditorily presented number series in ascending or descending order)  80%:;with cues  -HG     Status: Patient will improve attention skills by sustaining focus in order to actively hold and manipulate information provided (e.g.,  sequencing auditorily presented number series in ascending or descending order)  Progressing as expected  -HG     Comments: Patient will improve attention skills by sustaining focus in order to actively hold and manipulate information provided (e.g., sequencing auditorily presented number series in ascending or descending order)  12/3/20: Mental Manipulation for word progression: pt was 90% accurate.  10/22/20: Digit Span: pt continues to be successful with 6 digits. 7 digits: 6/7. 9/3/20: Digit Span: pt was able to recall up to 6 digits.   -HG     Patient will improve attention skills by alternating or shifting focus between two different tasks in order to complete both tasks  80%:;with cues  -HG     Status: Patient will improve attention skills by alternating or shifting focus between two different tasks in order to complete both tasks  Progressing as expected  -HG     Comments: Patient will improve attention skills by alternating or shifting focus between two different tasks in order to complete both tasks  12/10/20: Card Sorting by N and pt was less impulsive and used his own strategy for sorting and pt was 80% accurate. 11/12/20: Card sorting by N and suit: pt was 80-90% accurate with 2 run throughs.  10/22/20: Alternating attention for cards with N and into their suit and pt was 80% accurate. 9/3/20: Alternating attention for cards with N and sorting into suit and pt was 50% accurate.   -HG     Patient will improve attention skills by dividing focus and responding simultaneously to multiple tasks or in order to complete task  80%:;with cues  -HG     Status: Patient will improve attention skills by dividing focus and responding simultaneously to multiple tasks or in order to complete task  Progressing as expected  -HG     Comments: Patient will improve attention skills by dividing focus and responding simultaneously to multiple tasks or in order to complete task  12/3/20: Alternating attention for visual  scanning and pt's impulsivity plays ar role but pt improved to 90% accurate.  10/29/20: Card sorting based on order per suit and high to low. 9/10/20: Divided attention for card sorting according to suit and specific date: pt was 90% accurate.   -HG     Patient will improve attention skills by sustaining focus to high-level cognitive tasks in order to complete task  80%:;with cues  -HG     Status: Patient will improve attention skills by sustaining focus to high-level cognitive tasks in order to complete task  Progressing as expected  -HG     Comments: Patient will improve attention skills by sustaining focus to high-level cognitive tasks in order to complete task  2/4/21: Clock drawing and setting clock to accurate time: pt struggled initially with making the hour and minute hand the same length and with further instruction and cues, pt was 12/3/20: One Pile Card Game: pt was 80% accurate with mod cues. Pt continues to be impulsive when playing Independently and was 70% accurate.  11/19/20: One Pile Card Game: pt was impulsive and required mod cues and was 80% accurate.  10/15/20: One Pile Card Game: reviewed before play: pt was 80%accurate. 8/27/20: One Pile Card Game: Pt was 75% accurate.   -HG        Other Goals    Other Adult Goal- 1  Pt will improve RBANS Total to at least a 90 placing pt in the  Average range.   -HG     Status: Other Adult Goal- 1  Progressing as expected;Revised  -HG     Comments: Other Adult Goal- 1  1/21/21: RBANS Total score of 74 is down from 82 at time of last re-assessment.  11/5/30: RBANS Total of 82 improved from a 77. 10/1/20: FROMAJE: Pt score a 13- Stage 4.3. 9/10/20: RBANS not re-administered this date, pt given SLUMS and scored a 19/30.   -HG     Other Adult Goal- 2  Pt will complete high level reasoning tasks and problem solving with at least 80% accuracy.  -HG     Status: Other Adult Goal- 2  New;Progressing as expected  -HG     Comments: Other Adult Goal- 2  12/17/20:  Language and reasoning for game and pt was 90% accurate.  10/22/20: Deduction puzzle: pt was 70% accurate.    -HG        SLP Time Calculation    SLP Goal Re-Cert Due Date  03/27/21  -HG       User Key  (r) = Recorded By, (t) = Taken By, (c) = Cosigned By    Initials Name Provider Type    Candy Bello, MS CCC-SLP Speech and Language Pathologist          OP SLP Education     Row Name 02/25/21 0900       Education    Barriers to Learning  No barriers identified  -    Education Comments  Hmwk for the same 5 un-related words and use of his journal provided for delayed recall.   -HG      User Key  (r) = Recorded By, (t) = Taken By, (c) = Cosigned By    Initials Name Effective Dates    Candy Bello MS RADHA-SLP 08/09/20 -           OP SLP Assessment/Plan - 02/25/21 0900        SLP Assessment    Functional Problems  Speech Language- Adult/Cognition   -HG    Impact on Function: Adult Speech Language/Cognition  Trouble learning or remembering new information;Poor attention to task;Unable to complete specified job requirements;Difficulty following directions   -HG    Clinical Impression: Speech Language-Adult/Congnition  Moderate:;Cognitive Communication Impairment   -HG    Functional Problems Comment  Pt having difficulty remembering where he parked for appts and doesn't recall that we've reviewed it during the session and brings it up multiple times.   -HG    Clinical Impression Comments  RBANS was no re-administered this date.   -HG    Please refer to paper survey for additional self-reported information  No   -HG    Please refer to items scanned into chart for additional diagnostic informaiton and handouts as provided by clinician  No   -HG    SLP Diagnosis  Moderate cog-comm deficits   -HG    Prognosis  Excellent (comment)   -HG    Patient/caregiver participated in establishment of treatment plan and goals  Yes   -HG    Patient would benefit from skilled therapy intervention  Yes   -HG       SLP Plan     Frequency  1-2x/week   -HG    Duration  8 weeks   -HG    Planned CPT's?  SLP INDIVIDUAL SPEECH THERAPY: [id number]   -HG    Expected Duration of Therapy Session (SLP Eval)  60   -HG    Plan Comments  Cont with Cog tx.    -HG      User Key  (r) = Recorded By, (t) = Taken By, (c) = Cosigned By    Initials Name Provider Type     Candy Rubin MS CCC-SLP Speech and Language Pathologist                 Time Calculation:   SLP Start Time: 0900    Therapy Charges for Today     Code Description Service Date Service Provider Modifiers Qty    [id number]  ST TREATMENT SPEECH 4 2/25/2021 Candy Rubin, MS CCC-SLP GN 1                   Candy Rubin MS CCC-SLP  2/25/2021

## 2021-02-26 ENCOUNTER — TELEPHONE (OUTPATIENT)
Dept: NEUROLOGY | Facility: CLINIC | Age: 76
End: 2021-02-26

## 2021-03-04 ENCOUNTER — HOSPITAL ENCOUNTER (OUTPATIENT)
Dept: SPEECH THERAPY | Facility: HOSPITAL | Age: 76
Setting detail: THERAPIES SERIES
Discharge: HOME | End: 2021-03-04

## 2021-03-04 DIAGNOSIS — G31.84 MILD COGNITIVE IMPAIRMENT: Primary | ICD-10-CM

## 2021-03-04 PROCEDURE — 92507 TX SP LANG VOICE COMM INDIV: CPT

## 2021-03-04 NOTE — THERAPY TREATMENT NOTE
Outpatient Speech Language Pathology   Adult Speech Language Cognitive Treatment Note  [facility]     Patient Name: Aj Marie  : 1945  MRN: [id number]  Today's Date: 3/4/2021         Visit Date: 2021   Patient Active Problem List   Diagnosis   • First degree atrioventricular block   • OA (osteoarthritis)   • Hearing loss   • Essential hypertension   • Candidate for statin therapy due to risk of future cardiovascular event   • Atypical chest pain   • Mild cognitive impairment          Visit Dx:    ICD-10-CM ICD-9-CM   1. Mild cognitive impairment  G31.84 331.83                         SLP OP Goals     Row Name 21 0900          Goal Type Needed    Goal Type Needed  Memory;Attention/Orientation;Other Adult Goals  -HG        Subjective Comments    Subjective Comments  Pt alert, cooperative, returned with small notebook but not the one given to him at last session.   -HG        Memory Goals    Patient will be able to remember information needed to return to work and function on work-related tasks  90%:;with intermittent cues  -HG     Status: Patient will be able to remember information needed to return to work and function on work-related tasks  New  -HG     Patient will demonstrate improved ability to recall information by immediately recalling a series of words  80%:;related;after delay;with cues  -HG     Status: Patient will demonstrate improved ability to recall information by immediately recalling a series of words  Progressing as expected  -HG     Comments: Patient will demonstrate improved ability to recall information by immediately recalling a series of words  21: Immediate recall for shapes and figures and pt was 70% accurate I'ly. 21: RBANS re-assess score of 78 places pt in the Borderline range, down from previous re-assessment.  20: Immediate recall of picture scene: pt was 70% accurate.  20: Immediate recall of a picture scene and pt was 70% accurate with  min cues. 11/5/20: RBANS Immediate recall score: Pt improved to an 81 from a 73. 10/15/20: Immediate recall of visuospatial Constructional task and pt was 80% accurate with min cues. 10/1/20: ROMIE completed this date and pt did exhibit at least one significant error. 9/17/20: Immediate recall of word placement for 4 words: pt was 100% accurate. Hmwk for 5 words.  9/3/20: 4 related words from homework: pt was 2/4 I'ly. Moved to un-related pictures for visual ease and pt was 2/4. 8/27/20: 4 related words and pt was 1/4 and improved to 3/4 and 4/4 with mneumonic device.   -HG     Patient’s memory skills will be enhanced as reported by patient by utilizing internal memory strategies to recall up to 3 pieces of information after a 5- minute delay  80%:;with cues  -HG     Status: Patient’s memory skills will be enhanced as reported by patient by utilizing internal memory strategies to recall up to 3 pieces of information after a 5- minute delay  Progressing as expected  -HG     Comments: Patient’s memory skills will be enhanced as reported by patient by utilizing internal memory strategies to recall up to 3 pieces of information after a 5- minute delay  3/4/21: Reviewed 5 words used in a sentence from previous session and pt was 5/5 with some hesitation. Reviewed 3 more times and pt was 5/5. Provided 6 words for hmwk.  2/25/21: 5 un-related words from previous session and pt required use of a fill in sentence but I'ly, pt was 3/5.  2/4/21: 5 un-related words: pt was 3/5 consistently even with use of picture and sentence. Words given for homework.   Pt did use a written strategy to remember this date where he parked but couldn't recall for sure if he wrote it or not. 1/21/21: RBANS re-assess score of Delayed  recall is at 60, Extremely low range, down from last re-assessment by 19 points.  12/17/20: Delayed recall of 5 un-related words: pt was 3/5 with use of visual prompts.  12/10/20: Delayed recall of 4 un-related  words from homework and pt had no studied them and SLP reviewed and with a delay, pt was 3/4 consistently. 12/3/20: Delayed recall for 3 un-related and pt was 2/3 x 2 with review and with use of a sentence, pt was 3/3. 11/19/20: Delayed recall of 3 un-related words: pt was 2/3 with cues x 4. 11/12/20: Delayed recall of 8 related pictures from homework: pt was 8/8 x 2. 11/5/20: RBANS Delayed recall: pt was improved to a 79 from a 60.  10/29/20: Delayed recall of 6 related pictures: pt was 4/6. With use of first letter initials, pt was 6/6 accurate. 7th word added: pt was 7/7 x 3.   10/22/20: Delayed recall of 5 related pictures and pt was 5/5, 4/5, 5/5 x 2.  Added a sixth picture and pt was 5/6 . 10/15/20: Delayed recall of 4 related picture relative to pt and pt was 3/4 x 3. 10/1/20: FROMAJE: Delayed recall also exhibited at least one significant error. 9/17/20: 4 related words from homework and pt was 1/4- changed to 4 words that pertain to pt and he was 3/4.  9/10/20: 4 un-related pictures from homework: pt was 3/4.  2/4- moved to 4 related pictures and pt was 0/4 I'ly. With cues, pt was 3/4.   -HG     Patient’s memory skills will be enhanced as reported by patient by using external memory aides  80%:;with cues  -HG     Status: Patient’s memory skills will be enhanced as reported by patient by using external memory aides  Progressing as expected  -HG     Comments: Patient’s memory skills will be enhanced as reported by patient by using external memory aides  3/4/21: 2/25/21: Pt given a small notepad for delayed recall to carry in his pocket since he is not writing consistently in his larger journal. 2/4/21:Increased education and reasoning for use of an external memory aide. Provided visual prompt to return with memory book next session. 1/21/21: Education with pt regarding importance of journaling whether on phone (shown lavon and strated an entry for this date) or in a paper journal due to low Delayed recall  "scores. 12/17/20: Pt reports writing in journal daily but did not return with journal to session. 12/3/20: Pt reports writing in journal every other day. 11/19/20: Pt states \"a little bit.\" 11/12/20: Education with pt for use of journal and bringing it to next therapy session.  11/5/20: Education and expectation of using his journal daily and returning to therapy with it next week. 10/29/20: cont'd education for use of daily journal. Provided a visual prompt.   10/15/20: Pt reports writing a little- pt states that he doesn't write very well.  10/1/20: Significant education with pt and wife regarding use of journal and/or notes lavon on phone. 9/17/20: Pt writing in journal but not in great detail and not consistent- education ongoing.  9/10/20: Pt stated that he has written in his journal a little. Provided visual prompts to remind pt to write in his journal. 9/3/20: Pt states that he journaling \"a little\". 8/27/20: Education for use of journal and the need for his delayed recall.   -HG     Patient will demonstrate improved ability to recall information by stating activities patient has completed that day  80%:;with cues  -HG     Status: Patient will demonstrate improved ability to recall information by stating activities patient has completed that day  Progressing as expected  -HG     Comments: Patient will demonstrate improved ability to recall information by stating activities patient has completed that day  3/4/21: Pt stated that he may not remember what he did during the day to record it at night and SLP advised pt not to wait and record it at night, record it sa soon or as close to the time of the event. 2/25/21: Pt could not recall where he parked and brought it up multiple times and was bothered by it. SLP provided pt with a small steno pad and he wrote it down. 1/21/21: Pt required cueing for his confusion of a morning and that he missed his first appt and was re-scheduled.  11/12/20: Pt states that he does " "the same thing every day. 10/22/20: When asked about his weekend, he was able to provide general information, not specifics of places or things seen. 10/1/20: Thorough education with pt and wife regarding use of journal. Pt admitted having to admit to his deficits. 8/27/20: Education for recall of what he has done throughout the day. Pt states that he never really worried about these things, \"That's why I had 2 secretaries.\"   -HG        Attention/Orientation Goals    Patient will be able to execute all activities necessary to manage a home  Independently;With Cues  -HG     Status: Patient will be able to execute all activities necessary to manage a home  Progressing as expected  -HG     Comments: Patient will be able to execute all activities necessary to manage a home  1/21/21: RBANS re-assess Attention score of 91 is down from 103. 11/5/20: RBANS Attention Score: Improved to a 103 from an 88. 10/1/20: FROMAJE for attention and pt exhibited fast processing with minimal errors.   -HG     Patient will improve attention skills by sustaining consistent behavioral response during continuous and repetitive activity (e.g., listening for target words, auditory/reading comprehension tasks)  with cues;10 minute task  -HG     Status: Patient will improve attention skills by sustaining consistent behavioral response during continuous and repetitive activity (e.g., listening for target words, auditory/reading comprehension tasks)  Progressing as expected  -HG     Comments: Patient will improve attention skills by sustaining consistent behavioral response during continuous and repetitive activity (e.g., listening for target words, auditory/reading comprehension tasks)  3/4/21: Divided attention for answering questions from 22-36 word paragraphs while sorting cards by suit and pt was 80% accurate. 12/10/20: APT TEST: Pt scored above average for all areas except Divided attention and that was below average. Pt very concerned it " can't remember more than he does. 10/22/20: Recognition of cards presented quickly: pt was nearly 100% accurate. 9/10/20: Card ID for a card that is one less than one before it:   -HG     Patient will improve attention skills by sustaining focus in order to actively hold and manipulate information provided (e.g., sequencing auditorily presented number series in ascending or descending order)  80%:;with cues  -HG     Status: Patient will improve attention skills by sustaining focus in order to actively hold and manipulate information provided (e.g., sequencing auditorily presented number series in ascending or descending order)  Progressing as expected  -HG     Comments: Patient will improve attention skills by sustaining focus in order to actively hold and manipulate information provided (e.g., sequencing auditorily presented number series in ascending or descending order)  12/3/20: Mental Manipulation for word progression: pt was 90% accurate.  10/22/20: Digit Span: pt continues to be successful with 6 digits. 7 digits: 6/7. 9/3/20: Digit Span: pt was able to recall up to 6 digits.   -HG     Patient will improve attention skills by alternating or shifting focus between two different tasks in order to complete both tasks  80%:;with cues  -HG     Status: Patient will improve attention skills by alternating or shifting focus between two different tasks in order to complete both tasks  Progressing as expected  -HG     Comments: Patient will improve attention skills by alternating or shifting focus between two different tasks in order to complete both tasks  3/4/21: Card sorting by N and suit and pt was 90% accurate. 12/10/20: Card Sorting by N and pt was less impulsive and used his own strategy for sorting and pt was 80% accurate. 11/12/20: Card sorting by N and suit: pt was 80-90% accurate with 2 run throughs.  10/22/20: Alternating attention for cards with N and into their suit and pt was 80% accurate. 9/3/20:  Alternating attention for cards with N and sorting into suit and pt was 50% accurate.   -HG     Patient will improve attention skills by dividing focus and responding simultaneously to multiple tasks or in order to complete task  80%:;with cues  -HG     Status: Patient will improve attention skills by dividing focus and responding simultaneously to multiple tasks or in order to complete task  Progressing as expected  -HG     Comments: Patient will improve attention skills by dividing focus and responding simultaneously to multiple tasks or in order to complete task  3/4/21: Card game [place] and pt was 85% accurate. 12/3/20: Alternating attention for visual scanning and pt's impulsivity plays ar role but pt improved to 90% accurate.  10/29/20: Card sorting based on order per suit and high to low. 9/10/20: Divided attention for card sorting according to suit and specific date: pt was 90% accurate.   -HG     Patient will improve attention skills by sustaining focus to high-level cognitive tasks in order to complete task  80%:;with cues  -HG     Status: Patient will improve attention skills by sustaining focus to high-level cognitive tasks in order to complete task  Progressing as expected  -HG     Comments: Patient will improve attention skills by sustaining focus to high-level cognitive tasks in order to complete task  2/4/21: Clock drawing and setting clock to accurate time: pt struggled initially with making the hour and minute hand the same length and with further instruction and cues, pt was 12/3/20: One Pile Card Game: pt was 80% accurate with mod cues. Pt continues to be impulsive when playing Independently and was 70% accurate.  11/19/20: One Pile Card Game: pt was impulsive and required mod cues and was 80% accurate.  10/15/20: One Pile Card Game: reviewed before play: pt was 80%accurate. 8/27/20: One Pile Card Game: Pt was 75% accurate.   -HG        Other Goals    Other Adult Goal- 1  Pt will  improve RBANS Total to at least a 90 placing pt in the  Average range.   -HG     Status: Other Adult Goal- 1  Progressing as expected;Revised  -HG     Comments: Other Adult Goal- 1  1/21/21: RBANS Total score of 74 is down from 82 at time of last re-assessment.  11/5/30: RBANS Total of 82 improved from a 77. 10/1/20: FROMAJE: Pt score a 13- Stage 4.3. 9/10/20: RBANS not re-administered this date, pt given SLUMS and scored a 19/30.   -HG     Other Adult Goal- 2  Pt will complete high level reasoning tasks and problem solving with at least 80% accuracy.  -HG     Status: Other Adult Goal- 2  New;Progressing as expected  -HG     Comments: Other Adult Goal- 2  12/17/20: Language and reasoning for game and pt was 90% accurate.  10/22/20: Deduction puzzle: pt was 70% accurate.    -HG        SLP Time Calculation    SLP Goal Re-Cert Due Date  03/27/21  -HG       User Key  (r) = Recorded By, (t) = Taken By, (c) = Cosigned By    Initials Name Provider Type    Candy Bello MS CCC-SLP Speech and Language Pathologist          OP SLP Education     Row Name 03/04/21 0900       Education    Education Comments  Hmwk for 6 un-related words used in a sentence.   -HG      User Key  (r) = Recorded By, (t) = Taken By, (c) = Cosigned By    Initials Name Effective Dates    ALYSON Candy Rubin MS CCC-SLP 08/09/20 -           OP SLP Assessment/Plan - 03/04/21 0900        SLP Plan    Plan Comments  Cont with Cog tx.    -HG      User Key  (r) = Recorded By, (t) = Taken By, (c) = Cosigned By    Initials Name Provider Type    Candy Bello MS CCC-SLP Speech and Language Pathologist                 Time Calculation:   SLP Start Time: 0900    Therapy Charges for Today     Code Description Service Date Service Provider Modifiers Qty    [id number]  ST TREATMENT SPEECH 4 3/4/2021 Candy Rubin MS CCC-SLP GN 1                   Candy Rubin MS CCC-SLP  3/4/2021

## 2021-03-11 ENCOUNTER — HOSPITAL ENCOUNTER (OUTPATIENT)
Dept: SPEECH THERAPY | Facility: HOSPITAL | Age: 76
Setting detail: THERAPIES SERIES
Discharge: HOME | End: 2021-03-11

## 2021-03-11 DIAGNOSIS — G31.84 MILD COGNITIVE IMPAIRMENT: Primary | ICD-10-CM

## 2021-03-11 PROCEDURE — 92507 TX SP LANG VOICE COMM INDIV: CPT

## 2021-03-11 NOTE — THERAPY TREATMENT NOTE
Outpatient Speech Language Pathology   Adult Speech Language Cognitive Treatment Note  [facility]     Patient Name: Aj Marie  : 1945  MRN: [id number]  Today's Date: 3/11/2021         Visit Date: 2021   Patient Active Problem List   Diagnosis   • First degree atrioventricular block   • OA (osteoarthritis)   • Hearing loss   • Essential hypertension   • Candidate for statin therapy due to risk of future cardiovascular event   • Atypical chest pain   • Mild cognitive impairment          Visit Dx:    ICD-10-CM ICD-9-CM   1. Mild cognitive impairment  G31.84 331.83                         SLP OP Goals     Row Name 21 0900          Goal Type Needed    Goal Type Needed  Memory;Attention/Orientation;Other Adult Goals  -HG        Subjective Comments    Subjective Comments  Pt alert, cooperative, did not return with small pocket size journal.   -HG        Memory Goals    Patient will be able to remember information needed to return to work and function on work-related tasks  90%:;with intermittent cues  -HG     Status: Patient will be able to remember information needed to return to work and function on work-related tasks  New  -HG     Patient will demonstrate improved ability to recall information by immediately recalling a series of words  80%:;related;after delay;with cues  -HG     Status: Patient will demonstrate improved ability to recall information by immediately recalling a series of words  Progressing as expected  -HG     Comments: Patient will demonstrate improved ability to recall information by immediately recalling a series of words  3/11/21: Immediate recall of shapes and figures: pt was 70% accurate. 21: Immediate recall for shapes and figures and pt was 70% accurate I'ly. 21: RBANS re-assess score of 78 places pt in the Borderline range, down from previous re-assessment.  20: Immediate recall of picture scene: pt was 70% accurate.  20: Immediate recall of a  picture scene and pt was 70% accurate with min cues. 11/5/20: RBANS Immediate recall score: Pt improved to an 81 from a 73. 10/15/20: Immediate recall of visuospatial Constructional task and pt was 80% accurate with min cues. 10/1/20: ROMIE completed this date and pt did exhibit at least one significant error. 9/17/20: Immediate recall of word placement for 4 words: pt was 100% accurate. Hmwk for 5 words.  9/3/20: 4 related words from homework: pt was 2/4 I'ly. Moved to un-related pictures for visual ease and pt was 2/4. 8/27/20: 4 related words and pt was 1/4 and improved to 3/4 and 4/4 with mneumonic device.   -HG     Patient’s memory skills will be enhanced as reported by patient by utilizing internal memory strategies to recall up to 3 pieces of information after a 5- minute delay  80%:;with cues  -HG     Status: Patient’s memory skills will be enhanced as reported by patient by utilizing internal memory strategies to recall up to 3 pieces of information after a 5- minute delay  Progressing as expected  -HG     Comments: Patient’s memory skills will be enhanced as reported by patient by utilizing internal memory strategies to recall up to 3 pieces of information after a 5- minute delay  3/11/21: Pt was 6/6 with linking sentence in place.  3/4/21: Reviewed 5 words used in a sentence from previous session and pt was 5/5 with some hesitation. Reviewed 3 more times and pt was 5/5. Provided 6 words for hmwk.  2/25/21: 5 un-related words from previous session and pt required use of a fill in sentence but I'ly, pt was 3/5.  2/4/21: 5 un-related words: pt was 3/5 consistently even with use of picture and sentence. Words given for homework.   Pt did use a written strategy to remember this date where he parked but couldn't recall for sure if he wrote it or not. 1/21/21: RBANS re-assess score of Delayed  recall is at 60, Extremely low range, down from last re-assessment by 19 points.  12/17/20: Delayed recall of 5  un-related words: pt was 3/5 with use of visual prompts.  12/10/20: Delayed recall of 4 un-related words from homework and pt had no studied them and SLP reviewed and with a delay, pt was 3/4 consistently. 12/3/20: Delayed recall for 3 un-related and pt was 2/3 x 2 with review and with use of a sentence, pt was 3/3. 11/19/20: Delayed recall of 3 un-related words: pt was 2/3 with cues x 4. 11/12/20: Delayed recall of 8 related pictures from homework: pt was 8/8 x 2. 11/5/20: RBANS Delayed recall: pt was improved to a 79 from a 60.  10/29/20: Delayed recall of 6 related pictures: pt was 4/6. With use of first letter initials, pt was 6/6 accurate. 7th word added: pt was 7/7 x 3.   10/22/20: Delayed recall of 5 related pictures and pt was 5/5, 4/5, 5/5 x 2.  Added a sixth picture and pt was 5/6 . 10/15/20: Delayed recall of 4 related picture relative to pt and pt was 3/4 x 3. 10/1/20: FROMAJE: Delayed recall also exhibited at least one significant error. 9/17/20: 4 related words from homework and pt was 1/4- changed to 4 words that pertain to pt and he was 3/4.  9/10/20: 4 un-related pictures from homework: pt was 3/4.  2/4- moved to 4 related pictures and pt was 0/4 I'ly. With cues, pt was 3/4.   -HG     Patient’s memory skills will be enhanced as reported by patient by using external memory aides  80%:;with cues  -HG     Status: Patient’s memory skills will be enhanced as reported by patient by using external memory aides  Progressing as expected  -HG     Comments: Patient’s memory skills will be enhanced as reported by patient by using external memory aides  3/11/21: Pt not using his journal and had no recall of where it was.   2/25/21: Pt given a small notepad for delayed recall to carry in his pocket since he is not writing consistently in his larger journal. 2/4/21:Increased education and reasoning for use of an external memory aide. Provided visual prompt to return with memory book next session. 1/21/21:  "Education with pt regarding importance of journaling whether on phone (shown lavon and strated an entry for this date) or in a paper journal due to low Delayed recall scores. 12/17/20: Pt reports writing in journal daily but did not return with journal to session. 12/3/20: Pt reports writing in journal every other day. 11/19/20: Pt states \"a little bit.\" 11/12/20: Education with pt for use of journal and bringing it to next therapy session.  11/5/20: Education and expectation of using his journal daily and returning to therapy with it next week. 10/29/20: cont'd education for use of daily journal. Provided a visual prompt.   10/15/20: Pt reports writing a little- pt states that he doesn't write very well.  10/1/20: Significant education with pt and wife regarding use of journal and/or notes lavon on phone. 9/17/20: Pt writing in journal but not in great detail and not consistent- education ongoing.  9/10/20: Pt stated that he has written in his journal a little. Provided visual prompts to remind pt to write in his journal. 9/3/20: Pt states that he journaling \"a little\". 8/27/20: Education for use of journal and the need for his delayed recall.   -HG     Patient will demonstrate improved ability to recall information by stating activities patient has completed that day  80%:;with cues  -HG     Status: Patient will demonstrate improved ability to recall information by stating activities patient has completed that day  Progressing as expected  -HG     Comments: Patient will demonstrate improved ability to recall information by stating activities patient has completed that day  3/4/21: Pt stated that he may not remember what he did during the day to record it at night and SLP advised pt not to wait and record it at night, record it sa soon or as close to the time of the event. 2/25/21: Pt could not recall where he parked and brought it up multiple times and was bothered by it. SLP provided pt with a small steno pad and he " "wrote it down. 1/21/21: Pt required cueing for his confusion of a morning and that he missed his first appt and was re-scheduled.  11/12/20: Pt states that he does the same thing every day. 10/22/20: When asked about his weekend, he was able to provide general information, not specifics of places or things seen. 10/1/20: Thorough education with pt and wife regarding use of journal. Pt admitted having to admit to his deficits. 8/27/20: Education for recall of what he has done throughout the day. Pt states that he never really worried about these things, \"That's why I had 2 secretaries.\"   -HG        Attention/Orientation Goals    Patient will be able to execute all activities necessary to manage a home  Independently;With Cues  -HG     Status: Patient will be able to execute all activities necessary to manage a home  Progressing as expected  -HG     Comments: Patient will be able to execute all activities necessary to manage a home  1/21/21: RBANS re-assess Attention score of 91 is down from 103. 11/5/20: RBANS Attention Score: Improved to a 103 from an 88. 10/1/20: ROMIE for attention and pt exhibited fast processing with minimal errors.   -HG     Patient will improve attention skills by sustaining consistent behavioral response during continuous and repetitive activity (e.g., listening for target words, auditory/reading comprehension tasks)  with cues;10 minute task  -HG     Status: Patient will improve attention skills by sustaining consistent behavioral response during continuous and repetitive activity (e.g., listening for target words, auditory/reading comprehension tasks)  Progressing as expected  -HG     Comments: Patient will improve attention skills by sustaining consistent behavioral response during continuous and repetitive activity (e.g., listening for target words, auditory/reading comprehension tasks)  3/4/21: Divided attention for answering questions from 22-36 word paragraphs while sorting cards by " suit and pt was 80% accurate. 12/10/20: APT TEST: Pt scored above average for all areas except Divided attention and that was below average. Pt very concerned it can't remember more than he does. 10/22/20: Recognition of cards presented quickly: pt was nearly 100% accurate. 9/10/20: Card ID for a card that is one less than one before it:   -HG     Patient will improve attention skills by sustaining focus in order to actively hold and manipulate information provided (e.g., sequencing auditorily presented number series in ascending or descending order)  80%:;with cues  -HG     Status: Patient will improve attention skills by sustaining focus in order to actively hold and manipulate information provided (e.g., sequencing auditorily presented number series in ascending or descending order)  Progressing as expected  -HG     Comments: Patient will improve attention skills by sustaining focus in order to actively hold and manipulate information provided (e.g., sequencing auditorily presented number series in ascending or descending order)  3/11/21: Digit Span and pt was up to 8 digits. With distraction cards, pt was 90% accurate. 12/3/20: Mental Manipulation for word progression: pt was 90% accurate.  10/22/20: Digit Span: pt continues to be successful with 6 digits. 7 digits: 6/7. 9/3/20: Digit Span: pt was able to recall up to 6 digits.   -HG     Patient will improve attention skills by alternating or shifting focus between two different tasks in order to complete both tasks  80%:;with cues  -HG     Status: Patient will improve attention skills by alternating or shifting focus between two different tasks in order to complete both tasks  Progressing as expected  -HG     Comments: Patient will improve attention skills by alternating or shifting focus between two different tasks in order to complete both tasks  3/4/21: Card sorting by N and suit and pt was 90% accurate. 12/10/20: Card Sorting by N and pt was less impulsive  and used his own strategy for sorting and pt was 80% accurate. 11/12/20: Card sorting by N and suit: pt was 80-90% accurate with 2 run throughs.  10/22/20: Alternating attention for cards with N and into their suit and pt was 80% accurate. 9/3/20: Alternating attention for cards with N and sorting into suit and pt was 50% accurate.   -HG     Patient will improve attention skills by dividing focus and responding simultaneously to multiple tasks or in order to complete task  80%:;with cues  -HG     Status: Patient will improve attention skills by dividing focus and responding simultaneously to multiple tasks or in order to complete task  Progressing as expected  -HG     Comments: Patient will improve attention skills by dividing focus and responding simultaneously to multiple tasks or in order to complete task  3/4/21: Card game [place] and pt was 85% accurate. 12/3/20: Alternating attention for visual scanning and pt's impulsivity plays ar role but pt improved to 90% accurate.  10/29/20: Card sorting based on order per suit and high to low. 9/10/20: Divided attention for card sorting according to suit and specific date: pt was 90% accurate.   -HG     Patient will improve attention skills by sustaining focus to high-level cognitive tasks in order to complete task  80%:;with cues  -HG     Status: Patient will improve attention skills by sustaining focus to high-level cognitive tasks in order to complete task  Progressing as expected  -HG     Comments: Patient will improve attention skills by sustaining focus to high-level cognitive tasks in order to complete task  3/11/21: Visuospatial for shapes and figures and pt was 70% accurate. 2/4/21: Clock drawing and setting clock to accurate time: pt struggled initially with making the hour and minute hand the same length and with further instruction and cues, pt was 12/3/20: One Pile Card Game: pt was 80% accurate with mod cues. Pt continues to be impulsive when  playing Independently and was 70% accurate.  11/19/20: One Pile Card Game: pt was impulsive and required mod cues and was 80% accurate.  10/15/20: One Pile Card Game: reviewed before play: pt was 80%accurate. 8/27/20: One Pile Card Game: Pt was 75% accurate.   -HG        Other Goals    Other Adult Goal- 1  Pt will improve RBANS Total to at least a 90 placing pt in the  Average range.   -HG     Status: Other Adult Goal- 1  Progressing as expected;Revised  -HG     Comments: Other Adult Goal- 1  1/21/21: RBANS Total score of 74 is down from 82 at time of last re-assessment.  11/5/30: RBANS Total of 82 improved from a 77. 10/1/20: FROMAJE: Pt score a 13- Stage 4.3. 9/10/20: RBANS not re-administered this date, pt given SLUMS and scored a 19/30.   -HG     Other Adult Goal- 2  Pt will complete high level reasoning tasks and problem solving with at least 80% accuracy.  -HG     Status: Other Adult Goal- 2  New;Progressing as expected  -HG     Comments: Other Adult Goal- 2  12/17/20: Language and reasoning for game and pt was 90% accurate.  10/22/20: Deduction puzzle: pt was 70% accurate.    -HG        SLP Time Calculation    SLP Goal Re-Cert Due Date  03/27/21  -HG       User Key  (r) = Recorded By, (t) = Taken By, (c) = Cosigned By    Initials Name Provider Type    Candy Bello MS CCC-SLP Speech and Language Pathologist          OP SLP Education     Row Name 03/11/21 0900       Education    Education Comments  Hmwk for 6 new u-related words and for journaling with visual aides in place.   -HG      User Key  (r) = Recorded By, (t) = Taken By, (c) = Cosigned By    Initials Name Effective Dates    Candy Bello MS CCC-SLP 08/09/20 -           OP SLP Assessment/Plan - 03/11/21 0900        SLP Plan    Plan Comments  Cont with Cog tx.    -HG      User Key  (r) = Recorded By, (t) = Taken By, (c) = Cosigned By    Initials Name Provider Type    Candy Bello MS CCC-SLP Speech and Language Pathologist                  Time Calculation:   SLP Start Time: 0900    Therapy Charges for Today     Code Description Service Date Service Provider Modifiers Qty    [id number]  ST TREATMENT SPEECH 4 3/11/2021 Candy Rubin, MS CCC-SLP GN 1                   Candy Rubin MS CCC-SLP  3/11/2021

## 2021-03-18 ENCOUNTER — HOSPITAL ENCOUNTER (OUTPATIENT)
Dept: SPEECH THERAPY | Facility: HOSPITAL | Age: 76
Setting detail: THERAPIES SERIES
Discharge: HOME | End: 2021-03-18

## 2021-03-18 DIAGNOSIS — G31.84 MILD COGNITIVE IMPAIRMENT: Primary | ICD-10-CM

## 2021-03-18 PROCEDURE — 92507 TX SP LANG VOICE COMM INDIV: CPT

## 2021-03-18 NOTE — THERAPY TREATMENT NOTE
Outpatient Speech Language Pathology   Adult Speech Language Cognitive Treatment Note  [facility]     Patient Name: Aj Marie  : 1945  MRN: [id number]  Today's Date: 3/18/2021         Visit Date: 2021   Patient Active Problem List   Diagnosis   • First degree atrioventricular block   • OA (osteoarthritis)   • Hearing loss   • Essential hypertension   • Candidate for statin therapy due to risk of future cardiovascular event   • Atypical chest pain   • Mild cognitive impairment          Visit Dx:    ICD-10-CM ICD-9-CM   1. Mild cognitive impairment  G31.84 331.83                         SLP OP Goals     Row Name 21 0900          Goal Type Needed    Goal Type Needed  Memory;Attention/Orientation;Other Adult Goals  -HG        Subjective Comments    Subjective Comments  Pt alert, cooperative, returned with notebook. Had talked to wife about journaling and medication via phone conversation earlier in the week.   -HG        Memory Goals    Patient will be able to remember information needed to return to work and function on work-related tasks  90%:;with intermittent cues  -HG     Status: Patient will be able to remember information needed to return to work and function on work-related tasks  New  -HG     Patient will demonstrate improved ability to recall information by immediately recalling a series of words  80%:;related;after delay;with cues  -HG     Status: Patient will demonstrate improved ability to recall information by immediately recalling a series of words  Progressing as expected  -HG     Comments: Patient will demonstrate improved ability to recall information by immediately recalling a series of words  3/18/21: Immediate recall of paragraphs 36-50 words: pt was 60% accurate I'ly and with use of chunking, pt was 80% accurate. 3/11/21: Immediate recall of shapes and figures: pt was 70% accurate. 21: Immediate recall for shapes and figures and pt was 70% accurate I'ly. 21:  RBANS re-assess score of 78 places pt in the Borderline range, down from previous re-assessment.  11/19/20: Immediate recall of picture scene: pt was 70% accurate.  11/12/20: Immediate recall of a picture scene and pt was 70% accurate with min cues. 11/5/20: RBANS Immediate recall score: Pt improved to an 81 from a 73. 10/15/20: Immediate recall of visuospatial Constructional task and pt was 80% accurate with min cues. 10/1/20: ROMIE completed this date and pt did exhibit at least one significant error. 9/17/20: Immediate recall of word placement for 4 words: pt was 100% accurate. Hmwk for 5 words.  9/3/20: 4 related words from homework: pt was 2/4 I'ly. Moved to un-related pictures for visual ease and pt was 2/4. 8/27/20: 4 related words and pt was 1/4 and improved to 3/4 and 4/4 with mneumonic device.   -HG     Patient’s memory skills will be enhanced as reported by patient by utilizing internal memory strategies to recall up to 3 pieces of information after a 5- minute delay  80%:;with cues  -HG     Status: Patient’s memory skills will be enhanced as reported by patient by utilizing internal memory strategies to recall up to 3 pieces of information after a 5- minute delay  Progressing as expected  -HG     Comments: Patient’s memory skills will be enhanced as reported by patient by utilizing internal memory strategies to recall up to 3 pieces of information after a 5- minute delay  3/18/21: Pt was able to recall 6 un-related words in a sentence, 4/6, improved to 6/6 with use of strategies.  3/11/21: Pt was 6/6 with linking sentence in place.  3/4/21: Reviewed 5 words used in a sentence from previous session and pt was 5/5 with some hesitation. Reviewed 3 more times and pt was 5/5. Provided 6 words for hmwk.  2/25/21: 5 un-related words from previous session and pt required use of a fill in sentence but I'ly, pt was 3/5.  2/4/21: 5 un-related words: pt was 3/5 consistently even with use of picture and sentence.  Words given for homework.   Pt did use a written strategy to remember this date where he parked but couldn't recall for sure if he wrote it or not. 1/21/21: RBANS re-assess score of Delayed  recall is at 60, Extremely low range, down from last re-assessment by 19 points.  12/17/20: Delayed recall of 5 un-related words: pt was 3/5 with use of visual prompts.  12/10/20: Delayed recall of 4 un-related words from homework and pt had no studied them and SLP reviewed and with a delay, pt was 3/4 consistently. 12/3/20: Delayed recall for 3 un-related and pt was 2/3 x 2 with review and with use of a sentence, pt was 3/3. 11/19/20: Delayed recall of 3 un-related words: pt was 2/3 with cues x 4. 11/12/20: Delayed recall of 8 related pictures from homework: pt was 8/8 x 2. 11/5/20: RBANS Delayed recall: pt was improved to a 79 from a 60.  10/29/20: Delayed recall of 6 related pictures: pt was 4/6. With use of first letter initials, pt was 6/6 accurate. 7th word added: pt was 7/7 x 3.   10/22/20: Delayed recall of 5 related pictures and pt was 5/5, 4/5, 5/5 x 2.  Added a sixth picture and pt was 5/6 . 10/15/20: Delayed recall of 4 related picture relative to pt and pt was 3/4 x 3. 10/1/20: FROMAJE: Delayed recall also exhibited at least one significant error. 9/17/20: 4 related words from homework and pt was 1/4- changed to 4 words that pertain to pt and he was 3/4.  9/10/20: 4 un-related pictures from homework: pt was 3/4.  2/4- moved to 4 related pictures and pt was 0/4 I'ly. With cues, pt was 3/4.   -HG     Patient’s memory skills will be enhanced as reported by patient by using external memory aides  80%:;with cues  -HG     Status: Patient’s memory skills will be enhanced as reported by patient by using external memory aides  Progressing as expected  -HG     Comments: Patient’s memory skills will be enhanced as reported by patient by using external memory aides  3/18/21: Pt returned with his journal with a few notes in  "place. 3/11/21: Pt not using his journal and had no recall of where it was.   2/25/21: Pt given a small notepad for delayed recall to carry in his pocket since he is not writing consistently in his larger journal. 2/4/21:Increased education and reasoning for use of an external memory aide. Provided visual prompt to return with memory book next session. 1/21/21: Education with pt regarding importance of journaling whether on phone (shown lavon and strated an entry for this date) or in a paper journal due to low Delayed recall scores. 12/17/20: Pt reports writing in journal daily but did not return with journal to session. 12/3/20: Pt reports writing in journal every other day. 11/19/20: Pt states \"a little bit.\" 11/12/20: Education with pt for use of journal and bringing it to next therapy session.  11/5/20: Education and expectation of using his journal daily and returning to therapy with it next week. 10/29/20: cont'd education for use of daily journal. Provided a visual prompt.   10/15/20: Pt reports writing a little- pt states that he doesn't write very well.  10/1/20: Significant education with pt and wife regarding use of journal and/or notes lavon on phone. 9/17/20: Pt writing in journal but not in great detail and not consistent- education ongoing.  9/10/20: Pt stated that he has written in his journal a little. Provided visual prompts to remind pt to write in his journal. 9/3/20: Pt states that he journaling \"a little\". 8/27/20: Education for use of journal and the need for his delayed recall.   -HG     Patient will demonstrate improved ability to recall information by stating activities patient has completed that day  80%:;with cues  -HG     Status: Patient will demonstrate improved ability to recall information by stating activities patient has completed that day  Progressing as expected  -HG     Comments: Patient will demonstrate improved ability to recall information by stating activities patient has " "completed that day  3/4/21: Pt stated that he may not remember what he did during the day to record it at night and SLP advised pt not to wait and record it at night, record it sa soon or as close to the time of the event. 2/25/21: Pt could not recall where he parked and brought it up multiple times and was bothered by it. SLP provided pt with a small steno pad and he wrote it down. 1/21/21: Pt required cueing for his confusion of a morning and that he missed his first appt and was re-scheduled.  11/12/20: Pt states that he does the same thing every day. 10/22/20: When asked about his weekend, he was able to provide general information, not specifics of places or things seen. 10/1/20: Thorough education with pt and wife regarding use of journal. Pt admitted having to admit to his deficits. 8/27/20: Education for recall of what he has done throughout the day. Pt states that he never really worried about these things, \"That's why I had 2 secretaries.\"   -HG        Attention/Orientation Goals    Patient will be able to execute all activities necessary to manage a home  Independently;With Cues  -HG     Status: Patient will be able to execute all activities necessary to manage a home  Progressing as expected  -HG     Comments: Patient will be able to execute all activities necessary to manage a home  1/21/21: RBANS re-assess Attention score of 91 is down from 103. 11/5/20: RBANS Attention Score: Improved to a 103 from an 88. 10/1/20: FROMJE for attention and pt exhibited fast processing with minimal errors.   -HG     Patient will improve attention skills by sustaining consistent behavioral response during continuous and repetitive activity (e.g., listening for target words, auditory/reading comprehension tasks)  with cues;10 minute task  -HG     Status: Patient will improve attention skills by sustaining consistent behavioral response during continuous and repetitive activity (e.g., listening for target words, " auditory/reading comprehension tasks)  Progressing as expected  -HG     Comments: Patient will improve attention skills by sustaining consistent behavioral response during continuous and repetitive activity (e.g., listening for target words, auditory/reading comprehension tasks)  3/4/21: Divided attention for answering questions from 22-36 word paragraphs while sorting cards by suit and pt was 80% accurate. 12/10/20: APT TEST: Pt scored above average for all areas except Divided attention and that was below average. Pt very concerned it can't remember more than he does. 10/22/20: Recognition of cards presented quickly: pt was nearly 100% accurate. 9/10/20: Card ID for a card that is one less than one before it:   -HG     Patient will improve attention skills by sustaining focus in order to actively hold and manipulate information provided (e.g., sequencing auditorily presented number series in ascending or descending order)  80%:;with cues  -HG     Status: Patient will improve attention skills by sustaining focus in order to actively hold and manipulate information provided (e.g., sequencing auditorily presented number series in ascending or descending order)  Progressing as expected  -HG     Comments: Patient will improve attention skills by sustaining focus in order to actively hold and manipulate information provided (e.g., sequencing auditorily presented number series in ascending or descending order)  3/11/21: Digit Span and pt was up to 8 digits. With distraction cards, pt was 90% accurate. 12/3/20: Mental Manipulation for word progression: pt was 90% accurate.  10/22/20: Digit Span: pt continues to be successful with 6 digits. 7 digits: 6/7. 9/3/20: Digit Span: pt was able to recall up to 6 digits.   -HG     Patient will improve attention skills by alternating or shifting focus between two different tasks in order to complete both tasks  80%:;with cues  -HG     Status: Patient will improve attention skills by  alternating or shifting focus between two different tasks in order to complete both tasks  Progressing as expected  -HG     Comments: Patient will improve attention skills by alternating or shifting focus between two different tasks in order to complete both tasks  3/18/21: Card sorting by N and suit and pt was 60% accurate this date and felt that it was harder and when it was re-targeted with less impulsivity, pt was 80% accurate. 3/4/21: Card sorting by N and suit and pt was 90% accurate. 12/10/20: Card Sorting by N and pt was less impulsive and used his own strategy for sorting and pt was 80% accurate. 11/12/20: Card sorting by N and suit: pt was 80-90% accurate with 2 run throughs.  10/22/20: Alternating attention for cards with N and into their suit and pt was 80% accurate. 9/3/20: Alternating attention for cards with N and sorting into suit and pt was 50% accurate.   -HG     Patient will improve attention skills by dividing focus and responding simultaneously to multiple tasks or in order to complete task  80%:;with cues  -HG     Status: Patient will improve attention skills by dividing focus and responding simultaneously to multiple tasks or in order to complete task  Progressing as expected  -HG     Comments: Patient will improve attention skills by dividing focus and responding simultaneously to multiple tasks or in order to complete task  3/4/21: Card game [place] and pt was 85% accurate. 12/3/20: Alternating attention for visual scanning and pt's impulsivity plays ar role but pt improved to 90% accurate.  10/29/20: Card sorting based on order per suit and high to low. 9/10/20: Divided attention for card sorting according to suit and specific date: pt was 90% accurate.   -HG     Patient will improve attention skills by sustaining focus to high-level cognitive tasks in order to complete task  80%:;with cues  -HG     Status: Patient will improve attention skills by sustaining focus to high-level  cognitive tasks in order to complete task  Progressing as expected  -HG     Comments: Patient will improve attention skills by sustaining focus to high-level cognitive tasks in order to complete task  3/11/21: Visuospatial for shapes and figures and pt was 70% accurate. 2/4/21: Clock drawing and setting clock to accurate time: pt struggled initially with making the hour and minute hand the same length and with further instruction and cues, pt was 12/3/20: One Pile Card Game: pt was 80% accurate with mod cues. Pt continues to be impulsive when playing Independently and was 70% accurate.  11/19/20: One Pile Card Game: pt was impulsive and required mod cues and was 80% accurate.  10/15/20: One Pile Card Game: reviewed before play: pt was 80%accurate. 8/27/20: One Pile Card Game: Pt was 75% accurate.   -HG        Other Goals    Other Adult Goal- 1  Pt will improve RBANS Total to at least a 90 placing pt in the  Average range.   -HG     Status: Other Adult Goal- 1  Progressing as expected;Revised  -HG     Comments: Other Adult Goal- 1  1/21/21: RBANS Total score of 74 is down from 82 at time of last re-assessment.  11/5/30: RBANS Total of 82 improved from a 77. 10/1/20: FROMAJE: Pt score a 13- Stage 4.3. 9/10/20: RBANS not re-administered this date, pt given SLUMS and scored a 19/30.   -HG     Other Adult Goal- 2  Pt will complete high level reasoning tasks and problem solving with at least 80% accuracy.  -HG     Status: Other Adult Goal- 2  New;Progressing as expected  -HG     Comments: Other Adult Goal- 2  12/17/20: Language and reasoning for game and pt was 90% accurate.  10/22/20: Deduction puzzle: pt was 70% accurate.    -HG        SLP Time Calculation    SLP Goal Re-Cert Due Date  03/27/21  -HG       User Key  (r) = Recorded By, (t) = Taken By, (c) = Cosigned By    Initials Name Provider Type     Candy Rubin MS CCC-SLP Speech and Language Pathologist          OP SLP Education     Row Name 03/18/21 0900        Education    Education Comments  Hmwk for 7 un-related words and for daily journal entries.   -      User Key  (r) = Recorded By, (t) = Taken By, (c) = Cosigned By    Initials Name Effective Dates    Candy Bello MS CCC-SLP 08/09/20 -           OP SLP Assessment/Plan - 03/18/21 0900        SLP Plan    Plan Comments  cont with Cog tx.    -HG      User Key  (r) = Recorded By, (t) = Taken By, (c) = Cosigned By    Initials Name Provider Type    Candy Bello MS CCC-SLP Speech and Language Pathologist                 Time Calculation:   SLP Start Time: 0900    Therapy Charges for Today     Code Description Service Date Service Provider Modifiers Qty    [id number] HC ST TREATMENT SPEECH 4 3/18/2021 Candy Rubin MS CCC-SLP GN 1                   Candy Rubin MS CCC-SLP  3/18/2021

## 2021-03-25 ENCOUNTER — HOSPITAL ENCOUNTER (OUTPATIENT)
Dept: SPEECH THERAPY | Facility: HOSPITAL | Age: 76
Setting detail: THERAPIES SERIES
Discharge: HOME | End: 2021-03-25

## 2021-03-25 DIAGNOSIS — G31.84 MILD COGNITIVE IMPAIRMENT: Primary | ICD-10-CM

## 2021-03-25 PROCEDURE — 92507 TX SP LANG VOICE COMM INDIV: CPT

## 2021-03-25 NOTE — THERAPY TREATMENT NOTE
Outpatient Speech Language Pathology   Adult Speech Language Cognitive Progress Note  [facility]     Patient Name: Aj Marie  : 1945  MRN: [id number]  Today's Date: 3/25/2021         Visit Date: 2021   Patient Active Problem List   Diagnosis   • First degree atrioventricular block   • OA (osteoarthritis)   • Hearing loss   • Essential hypertension   • Candidate for statin therapy due to risk of future cardiovascular event   • Atypical chest pain   • Mild cognitive impairment          Visit Dx:    ICD-10-CM ICD-9-CM   1. Mild cognitive impairment  G31.84 331.83                         SLP OP Goals     Row Name 21 0905          Goal Type Needed    Goal Type Needed  Memory;Attention/Orientation;Other Adult Goals  -HG        Subjective Comments    Subjective Comments  Pt alert, cooperative, did not return with journal or awareness of homework.   -HG        Memory Goals    Patient will be able to remember information needed to return to work and function on work-related tasks  90%:;with intermittent cues  -HG     Status: Patient will be able to remember information needed to return to work and function on work-related tasks  Progressing as expected  -HG     Comments: Patient will be able to remember information needed to return to work and function on work-related tasks  3/25/21: Pt continues to work with strategies in place.   -HG     Patient will demonstrate improved ability to recall information by immediately recalling a series of words  80%:;related;after delay;with cues  -HG     Status: Patient will demonstrate improved ability to recall information by immediately recalling a series of words  Progressing as expected  -HG     Comments: Patient will demonstrate improved ability to recall information by immediately recalling a series of words  3/25/21: RBANS Immediate recall and pt was 87, improved from a 78. 3/18/21: Immediate recall of paragraphs 36-50 words: pt was 60% accurate I'ly  and with use of chunking, pt was 80% accurate. 3/11/21: Immediate recall of shapes and figures: pt was 70% accurate. 2/25/21: Immediate recall for shapes and figures and pt was 70% accurate I'ly. 1/21/21: RBANS re-assess score of 78 places pt in the Borderline range, down from previous re-assessment.  11/19/20: Immediate recall of picture scene: pt was 70% accurate.  11/12/20: Immediate recall of a picture scene and pt was 70% accurate with min cues. 11/5/20: RBANS Immediate recall score: Pt improved to an 81 from a 73. 10/15/20: Immediate recall of visuospatial Constructional task and pt was 80% accurate with min cues. 10/1/20: ROMIE completed this date and pt did exhibit at least one significant error. 9/17/20: Immediate recall of word placement for 4 words: pt was 100% accurate. Hmwk for 5 words.  9/3/20: 4 related words from homework: pt was 2/4 I'ly. Moved to un-related pictures for visual ease and pt was 2/4. 8/27/20: 4 related words and pt was 1/4 and improved to 3/4 and 4/4 with mneumonic device.   -HG     Patient’s memory skills will be enhanced as reported by patient by utilizing internal memory strategies to recall up to 3 pieces of information after a 5- minute delay  80%:;with cues  -HG     Status: Patient’s memory skills will be enhanced as reported by patient by utilizing internal memory strategies to recall up to 3 pieces of information after a 5- minute delay  Progressing as expected  -HG     Comments: Patient’s memory skills will be enhanced as reported by patient by utilizing internal memory strategies to recall up to 3 pieces of information after a 5- minute delay  3/25/21: RBANS Delayed recall was 60 which remains consistent with last re-assessment score. 3/18/21: Pt was able to recall 6 un-related words in a sentence, 4/6, improved to 6/6 with use of strategies.  3/11/21: Pt was 6/6 with linking sentence in place.  3/4/21: Reviewed 5 words used in a sentence from previous session and pt was  5/5 with some hesitation. Reviewed 3 more times and pt was 5/5. Provided 6 words for hmwk.  2/25/21: 5 un-related words from previous session and pt required use of a fill in sentence but I'ly, pt was 3/5.  2/4/21: 5 un-related words: pt was 3/5 consistently even with use of picture and sentence. Words given for homework.   Pt did use a written strategy to remember this date where he parked but couldn't recall for sure if he wrote it or not. 1/21/21: RBANS re-assess score of Delayed  recall is at 60, Extremely low range, down from last re-assessment by 19 points.  12/17/20: Delayed recall of 5 un-related words: pt was 3/5 with use of visual prompts.  12/10/20: Delayed recall of 4 un-related words from homework and pt had no studied them and SLP reviewed and with a delay, pt was 3/4 consistently. 12/3/20: Delayed recall for 3 un-related and pt was 2/3 x 2 with review and with use of a sentence, pt was 3/3. 11/19/20: Delayed recall of 3 un-related words: pt was 2/3 with cues x 4. 11/12/20: Delayed recall of 8 related pictures from homework: pt was 8/8 x 2. 11/5/20: RBANS Delayed recall: pt was improved to a 79 from a 60.  10/29/20: Delayed recall of 6 related pictures: pt was 4/6. With use of first letter initials, pt was 6/6 accurate. 7th word added: pt was 7/7 x 3.   10/22/20: Delayed recall of 5 related pictures and pt was 5/5, 4/5, 5/5 x 2.  Added a sixth picture and pt was 5/6 . 10/15/20: Delayed recall of 4 related picture relative to pt and pt was 3/4 x 3. 10/1/20: FROMAJE: Delayed recall also exhibited at least one significant error. 9/17/20: 4 related words from homework and pt was 1/4- changed to 4 words that pertain to pt and he was 3/4.  9/10/20: 4 un-related pictures from homework: pt was 3/4.  2/4- moved to 4 related pictures and pt was 0/4 I'ly. With cues, pt was 3/4.   -HG     Patient’s memory skills will be enhanced as reported by patient by using external memory aides  80%:;with cues  -HG     Status:  "Patient’s memory skills will be enhanced as reported by patient by using external memory aides  Progressing as expected  -HG     Comments: Patient’s memory skills will be enhanced as reported by patient by using external memory aides  3/25/21: Pt continues to not use his journal. 3/18/21: Pt returned with his journal with a few notes in place. 3/11/21: Pt not using his journal and had no recall of where it was.   2/25/21: Pt given a small notepad for delayed recall to carry in his pocket since he is not writing consistently in his larger journal. 2/4/21:Increased education and reasoning for use of an external memory aide. Provided visual prompt to return with memory book next session. 1/21/21: Education with pt regarding importance of journaling whether on phone (shown lavon and strated an entry for this date) or in a paper journal due to low Delayed recall scores. 12/17/20: Pt reports writing in journal daily but did not return with journal to session. 12/3/20: Pt reports writing in journal every other day. 11/19/20: Pt states \"a little bit.\" 11/12/20: Education with pt for use of journal and bringing it to next therapy session.  11/5/20: Education and expectation of using his journal daily and returning to therapy with it next week. 10/29/20: cont'd education for use of daily journal. Provided a visual prompt.   10/15/20: Pt reports writing a little- pt states that he doesn't write very well.  10/1/20: Significant education with pt and wife regarding use of journal and/or notes lavon on phone. 9/17/20: Pt writing in journal but not in great detail and not consistent- education ongoing.  9/10/20: Pt stated that he has written in his journal a little. Provided visual prompts to remind pt to write in his journal. 9/3/20: Pt states that he journaling \"a little\". 8/27/20: Education for use of journal and the need for his delayed recall.   -HG     Patient will demonstrate improved ability to recall information by stating " "activities patient has completed that day  70%:;with cues  -HG     Status: Patient will demonstrate improved ability to recall information by stating activities patient has completed that day  Revised;Progressing as expected  -HG     Comments: Patient will demonstrate improved ability to recall information by stating activities patient has completed that day  3/25/21: Pt stated, \"I remember it now but when I get home or wherever I'm going, I don't remember.\" 3/4/21: Pt stated that he may not remember what he did during the day to record it at night and SLP advised pt not to wait and record it at night, record it sa soon or as close to the time of the event. 2/25/21: Pt could not recall where he parked and brought it up multiple times and was bothered by it. SLP provided pt with a small steno pad and he wrote it down. 1/21/21: Pt required cueing for his confusion of a morning and that he missed his first appt and was re-scheduled.  11/12/20: Pt states that he does the same thing every day. 10/22/20: When asked about his weekend, he was able to provide general information, not specifics of places or things seen. 10/1/20: Thorough education with pt and wife regarding use of journal. Pt admitted having to admit to his deficits. 8/27/20: Education for recall of what he has done throughout the day. Pt states that he never really worried about these things, \"That's why I had 2 secretaries.\"   -HG        Attention/Orientation Goals    Patient will be able to execute all activities necessary to manage a home  Independently;With Cues  -HG     Status: Patient will be able to execute all activities necessary to manage a home  Progressing as expected  -HG     Comments: Patient will be able to execute all activities necessary to manage a home  3/25/21: RBANS Attention score of 94 is improved from 91 on previous re-assessment. 1/21/21: RBANS re-assess Attention score of 91 is down from 103. 11/5/20: RBANS Attention Score: Improved " to a 103 from an 88. 10/1/20: FROMANDERSONJE for attention and pt exhibited fast processing with minimal errors.   -HG     Patient will improve attention skills by sustaining consistent behavioral response during continuous and repetitive activity (e.g., listening for target words, auditory/reading comprehension tasks)  with cues;10 minute task  -HG     Status: Patient will improve attention skills by sustaining consistent behavioral response during continuous and repetitive activity (e.g., listening for target words, auditory/reading comprehension tasks)  Progressing as expected  -HG     Comments: Patient will improve attention skills by sustaining consistent behavioral response during continuous and repetitive activity (e.g., listening for target words, auditory/reading comprehension tasks)  3/4/21: Divided attention for answering questions from 22-36 word paragraphs while sorting cards by suit and pt was 80% accurate. 12/10/20: APT TEST: Pt scored above average for all areas except Divided attention and that was below average. Pt very concerned it can't remember more than he does. 10/22/20: Recognition of cards presented quickly: pt was nearly 100% accurate. 9/10/20: Card ID for a card that is one less than one before it:   -HG     Patient will improve attention skills by sustaining focus in order to actively hold and manipulate information provided (e.g., sequencing auditorily presented number series in ascending or descending order)  90%:;with cues  -HG     Status: Patient will improve attention skills by sustaining focus in order to actively hold and manipulate information provided (e.g., sequencing auditorily presented number series in ascending or descending order)  Progressing as expected;Revised  -HG     Comments: Patient will improve attention skills by sustaining focus in order to actively hold and manipulate information provided (e.g., sequencing auditorily presented number series in ascending or descending  order)  3/11/21: Digit Span and pt was up to 8 digits. With distraction cards, pt was 90% accurate. 12/3/20: Mental Manipulation for word progression: pt was 90% accurate.  10/22/20: Digit Span: pt continues to be successful with 6 digits. 7 digits: 6/7. 9/3/20: Digit Span: pt was able to recall up to 6 digits.   -HG     Patient will improve attention skills by alternating or shifting focus between two different tasks in order to complete both tasks  80%:;with cues  -HG     Status: Patient will improve attention skills by alternating or shifting focus between two different tasks in order to complete both tasks  Progressing as expected  -HG     Comments: Patient will improve attention skills by alternating or shifting focus between two different tasks in order to complete both tasks  3/18/21: Card sorting by N and suit and pt was 60% accurate this date and felt that it was harder and when it was re-targeted with less impulsivity, pt was 80% accurate. 3/4/21: Card sorting by N and suit and pt was 90% accurate. 12/10/20: Card Sorting by N and pt was less impulsive and used his own strategy for sorting and pt was 80% accurate. 11/12/20: Card sorting by N and suit: pt was 80-90% accurate with 2 run throughs.  10/22/20: Alternating attention for cards with N and into their suit and pt was 80% accurate. 9/3/20: Alternating attention for cards with N and sorting into suit and pt was 50% accurate.   -HG     Patient will improve attention skills by dividing focus and responding simultaneously to multiple tasks or in order to complete task  90%:;with cues  -HG     Status: Patient will improve attention skills by dividing focus and responding simultaneously to multiple tasks or in order to complete task  Progressing as expected;Revised  -HG     Comments: Patient will improve attention skills by dividing focus and responding simultaneously to multiple tasks or in order to complete task  3/4/21: Card game [place] and  pt was 85% accurate. 12/3/20: Alternating attention for visual scanning and pt's impulsivity plays ar role but pt improved to 90% accurate.  10/29/20: Card sorting based on order per suit and high to low. 9/10/20: Divided attention for card sorting according to suit and specific date: pt was 90% accurate.   -HG     Patient will improve attention skills by sustaining focus to high-level cognitive tasks in order to complete task  80%:;with cues  -HG     Status: Patient will improve attention skills by sustaining focus to high-level cognitive tasks in order to complete task  Progressing as expected  -HG     Comments: Patient will improve attention skills by sustaining focus to high-level cognitive tasks in order to complete task  3/11/21: Visuospatial for shapes and figures and pt was 70% accurate. 2/4/21: Clock drawing and setting clock to accurate time: pt struggled initially with making the hour and minute hand the same length and with further instruction and cues, pt was 12/3/20: One Pile Card Game: pt was 80% accurate with mod cues. Pt continues to be impulsive when playing Independently and was 70% accurate.  11/19/20: One Pile Card Game: pt was impulsive and required mod cues and was 80% accurate.  10/15/20: One Pile Card Game: reviewed before play: pt was 80%accurate. 8/27/20: One Pile Card Game: Pt was 75% accurate.   -HG        Other Goals    Other Adult Goal- 1  Pt will improve RBANS Total to at least a 85 placing pt in the Low  Average range.   -HG     Status: Other Adult Goal- 1  Progressing as expected;Revised  -HG     Comments: Other Adult Goal- 1  3/25/21: RBANS Total Score of 78 falls in the [place] Range and pt was at 74. 1/21/21: RBANS Total score of 74 is down from 82 at time of last re-assessment.  11/5/30: RBANS Total of 82 improved from a 77. 10/1/20: FROMAJE: Pt score a 13- Stage 4.3. 9/10/20: RBANS not re-administered this date, pt given SLUMS and scored a 19/30.   -HG     Other Adult Goal- 2   Pt will complete high level reasoning tasks and problem solving with at least 80% accuracy.  -HG     Status: Other Adult Goal- 2  New;Progressing as expected  -HG     Comments: Other Adult Goal- 2  12/17/20: Language and reasoning for game and pt was 90% accurate.  10/22/20: Deduction puzzle: pt was 70% accurate.    -HG        SLP Time Calculation    SLP Goal Re-Cert Due Date  04/24/21  -HG       User Key  (r) = Recorded By, (t) = Taken By, (c) = Cosigned By    Initials Name Provider Type    Candy Bello, MS CCC-SLP Speech and Language Pathologist          OP SLP Education     Row Name 03/25/21 0905       Education    Education Comments  Hmwk for use of his journal and a daily reminder to aide in Delayed recall.   -HG      User Key  (r) = Recorded By, (t) = Taken By, (c) = Cosigned By    Initials Name Effective Dates    Candy Bello, MS [facility]-SLP 08/09/20 -           OP SLP Assessment/Plan - 03/25/21 0905        SLP Assessment    Functional Problems  Speech Language- Adult/Cognition   -HG    Impact on Function: Adult Speech Language/Cognition  Trouble learning or remembering new information   -HG    Clinical Impression: Speech Language-Adult/Congnition  Mild-Moderate:;Cognitive Communication Impairment   -HG    Functional Problems Comment  Pt having difficulty with carryover from session to session regarding his journal.   -HG    Clinical Impression Comments  RBANS Total Score of 78 is improved from a previous 74.    -HG    Please refer to paper survey for additional self-reported information  Yes   -HG    Please refer to items scanned into chart for additional diagnostic informaiton and handouts as provided by clinician  Yes   -HG    SLP Diagnosis  Mild to Moderate Cognitive Communication Impairment   -HG    Prognosis  Excellent (comment)   -HG    Patient/caregiver participated in establishment of treatment plan and goals  Yes   -HG    Patient would benefit from skilled therapy intervention  Yes    -HG       SLP Plan    Frequency  1-2x/week   -HG    Duration  4 weeks   -HG    Planned CPT's?  SLP INDIVIDUAL SPEECH THERAPY: 79989   -HG    Expected Duration of Therapy Session (SLP Eval)  60   -HG    Plan Comments  Cont with Cog tx.    -HG      User Key  (r) = Recorded By, (t) = Taken By, (c) = Cosigned By    Initials Name Provider Type     Candy Rubin, MS CCC-SLP Speech and Language Pathologist                 Time Calculation:   SLP Start Time: 0905    Therapy Charges for Today     Code Description Service Date Service Provider Modifiers Qty    [id number]  ST TREATMENT SPEECH 4 3/25/2021 Candy Rubin MS CCC-SLP GN 1                   Candy Rubin MS CCC-SLP  3/25/2021

## 2021-04-08 ENCOUNTER — HOSPITAL ENCOUNTER (OUTPATIENT)
Dept: SPEECH THERAPY | Facility: HOSPITAL | Age: 76
Setting detail: THERAPIES SERIES
Discharge: HOME | End: 2021-04-08

## 2021-04-08 DIAGNOSIS — G31.84 MILD COGNITIVE IMPAIRMENT: Primary | ICD-10-CM

## 2021-04-08 PROCEDURE — 92507 TX SP LANG VOICE COMM INDIV: CPT

## 2021-04-08 NOTE — THERAPY TREATMENT NOTE
"Outpatient Speech Language Pathology   Adult Speech Language Cognitive Treatment Note  [facility]     Patient Name: Aj Marie  : 1945  MRN: [id number]  Today's Date: 2021         Visit Date: 2021   Patient Active Problem List   Diagnosis   • First degree atrioventricular block   • OA (osteoarthritis)   • Hearing loss   • Essential hypertension   • Candidate for statin therapy due to risk of future cardiovascular event   • Atypical chest pain   • Mild cognitive impairment          Visit Dx:    ICD-10-CM ICD-9-CM   1. Mild cognitive impairment  G31.84 331.83                         SLP OP Goals     Row Name 21 0900          Goal Type Needed    Goal Type Needed  Memory;Attention/Orientation;Other Adult Goals  (Pended)   -LB        Subjective Comments    Subjective Comments  Pt alert, aware. Did not return with homework.  (Pended)   -LB        Memory Goals    Patient will be able to remember information needed to return to work and function on work-related tasks  80%:;with intermittent cues  (Pended)   -LB     Status: Patient will be able to remember information needed to return to work and function on work-related tasks  Progressing as expected  (Pended)   -LB     Comments: Patient will be able to remember information needed to return to work and function on work-related tasks  21: Pt noted he remembers \"important\" information that he cares about. Pt states he is not having issues with work related memory.  (Pended)   -LB     Patient will demonstrate improved ability to recall information by immediately recalling a series of words  80%:;related;after delay;with cues  (Pended)   -LB     Status: Patient will demonstrate improved ability to recall information by immediately recalling a series of words  Progressing as expected  (Pended)   -LB     Comments: Patient will demonstrate improved ability to recall information by immediately recalling a series of words  3/25/21: RBANS " "Immediate recall and pt was 87, improved from a 78. 3/18/21: Immediate recall of paragraphs 36-50 words: pt was 60% accurate I'ly and with use of chunking, pt was 80% accurate. 3/11/21: Immediate recall of shapes and figures: pt was 70% accurate. 2/25/21: Immediate recall for shapes and figures and pt was 70% accurate I'ly. 1/21/21: RBANS re-assess score of 78 places pt in the Borderline range, down from previous re-assessment.  11/19/20: Immediate recall of picture scene: pt was 70% accurate.  11/12/20: Immediate recall of a picture scene and pt was 70% accurate with min cues. 11/5/20: RBANS Immediate recall score: Pt improved to an 81 from a 73. 10/15/20: Immediate recall of visuospatial Constructional task and pt was 80% accurate with min cues. 10/1/20: ROMIE completed this date and pt did exhibit at least one significant error. 9/17/20: Immediate recall of word placement for 4 words: pt was 100% accurate. Hmwk for 5 words.  9/3/20: 4 related words from homework: pt was 2/4 I'ly. Moved to un-related pictures for visual ease and pt was 2/4. 8/27/20: 4 related words and pt was 1/4 and improved to 3/4 and 4/4 with mneumonic device.   (Pended)   -LB     Patient’s memory skills will be enhanced as reported by patient by utilizing internal memory strategies to recall up to 3 pieces of information after a 5- minute delay  90%:;with cues  (Pended)   -LB     Status: Patient’s memory skills will be enhanced as reported by patient by utilizing internal memory strategies to recall up to 3 pieces of information after a 5- minute delay  Progressing as expected  (Pended)   -LB     Comments: Patient’s memory skills will be enhanced as reported by patient by utilizing internal memory strategies to recall up to 3 pieces of information after a 5- minute delay  4/8/21: Pt recalled strategy of \"always parking on the top floor\" when asked throughout the session. Pt noted he wants to automatically remember information without needing " "an external memory aid.  (Pended)   -LB     Patient’s memory skills will be enhanced as reported by patient by using external memory aides  90%:;with intermittent cues  (Pended)   -LB     Status: Patient’s memory skills will be enhanced as reported by patient by using external memory aides  Progressing as expected  (Pended)   -LB     Comments: Patient’s memory skills will be enhanced as reported by patient by using external memory aides  Pt utilized external memory aid to accurately attend to memory therapy activity with a high level of accuracy. Pt noted the external memory aid helped him perform better than without it.  (Pended)   -LB     Patient will demonstrate improved ability to recall information by stating activities patient has completed that day  70%:;with cues  (Pended)   -LB     Status: Patient will demonstrate improved ability to recall information by stating activities patient has completed that day  Revised;Progressing as expected  (Pended)   -LB     Comments: Patient will demonstrate improved ability to recall information by stating activities patient has completed that day  3/25/21: Pt stated, \"I remember it now but when I get home or wherever I'm going, I don't remember.\" 3/4/21: Pt stated that he may not remember what he did during the day to record it at night and SLP advised pt not to wait and record it at night, record it sa soon or as close to the time of the event. 2/25/21: Pt could not recall where he parked and brought it up multiple times and was bothered by it. SLP provided pt with a small steno pad and he wrote it down. 1/21/21: Pt required cueing for his confusion of a morning and that he missed his first appt and was re-scheduled.  11/12/20: Pt states that he does the same thing every day. 10/22/20: When asked about his weekend, he was able to provide general information, not specifics of places or things seen. 10/1/20: Thorough education with pt and wife regarding use of journal. Pt " "admitted having to admit to his deficits. 8/27/20: Education for recall of what he has done throughout the day. Pt states that he never really worried about these things, \"That's why I had 2 secretaries.\"   (Pended)   -LB        Attention/Orientation Goals    Patient will be able to execute all activities necessary to manage a home  Independently;With Cues  (Pended)   -LB     Status: Patient will be able to execute all activities necessary to manage a home  Progressing as expected  (Pended)   -LB     Comments: Patient will be able to execute all activities necessary to manage a home  3/25/21: RBANS Attention score of 94 is improved from 91 on previous re-assessment. 1/21/21: RBANS re-assess Attention score of 91 is down from 103. 11/5/20: RBANS Attention Score: Improved to a 103 from an 88. 10/1/20: FROMAJE for attention and pt exhibited fast processing with minimal errors.   (Pended)   -LB     Patient will improve attention skills by sustaining consistent behavioral response during continuous and repetitive activity (e.g., listening for target words, auditory/reading comprehension tasks)  with cues;20 minute task  (Pended)   -LB     Status: Patient will improve attention skills by sustaining consistent behavioral response during continuous and repetitive activity (e.g., listening for target words, auditory/reading comprehension tasks)  Progressing as expected  (Pended)   -LB     Comments: Patient will improve attention skills by sustaining consistent behavioral response during continuous and repetitive activity (e.g., listening for target words, auditory/reading comprehension tasks)  4/8/21: Pt consistently attended and participated in 20 minute card game where he had to add first two cards, then 2nd and third card alternatively. Pt required external memory aid to recall card values.  (Pended)   -LB     Patient will improve attention skills by sustaining focus in order to actively hold and manipulate information " provided (e.g., sequencing auditorily presented number series in ascending or descending order)  90%:;with cues  (Pended)   -LB     Status: Patient will improve attention skills by sustaining focus in order to actively hold and manipulate information provided (e.g., sequencing auditorily presented number series in ascending or descending order)  Progressing as expected  (Pended)   -LB     Comments: Patient will improve attention skills by sustaining focus in order to actively hold and manipulate information provided (e.g., sequencing auditorily presented number series in ascending or descending order)  4/8/2021: Pt attended to card game that required he switch focus between cards one and two before switching to cards two and 3 alternatively while going through the entire card deck.  (Pended)   -LB     Patient will improve attention skills by alternating or shifting focus between two different tasks in order to complete both tasks  70%:;with cues  (Pended)   -LB     Status: Patient will improve attention skills by alternating or shifting focus between two different tasks in order to complete both tasks  Progressing as expected  (Pended)   -LB     Comments: Patient will improve attention skills by alternating or shifting focus between two different tasks in order to complete both tasks  Alternating attention for listening and answering various question types such as fill in the blank, specific number (e.g. how many tons?), general memory (what is the largest animal)?  (Pended)   -LB     Patient will improve attention skills by dividing focus and responding simultaneously to multiple tasks or in order to complete task  90%:;with cues  (Pended)   -LB     Status: Patient will improve attention skills by dividing focus and responding simultaneously to multiple tasks or in order to complete task  Progressing as expected;Revised  (Pended)   -LB     Comments: Patient will improve attention skills by dividing focus and  responding simultaneously to multiple tasks or in order to complete task  3/4/21: Card game [place] and pt was 85% accurate. 12/3/20: Alternating attention for visual scanning and pt's impulsivity plays ar role but pt improved to 90% accurate.  10/29/20: Card sorting based on order per suit and high to low. 9/10/20: Divided attention for card sorting according to suit and specific date: pt was 90% accurate.   (Pended)   -LB     Patient will improve attention skills by sustaining focus to high-level cognitive tasks in order to complete task  80%:;with cues  (Pended)   -LB     Status: Patient will improve attention skills by sustaining focus to high-level cognitive tasks in order to complete task  Progressing as expected  (Pended)   -LB     Comments: Patient will improve attention skills by sustaining focus to high-level cognitive tasks in order to complete task  3/11/21: Visuospatial for shapes and figures and pt was 70% accurate. 2/4/21: Clock drawing and setting clock to accurate time: pt struggled initially with making the hour and minute hand the same length and with further instruction and cues, pt was 12/3/20: One Pile Card Game: pt was 80% accurate with mod cues. Pt continues to be impulsive when playing Independently and was 70% accurate.  11/19/20: One Pile Card Game: pt was impulsive and required mod cues and was 80% accurate.  10/15/20: One Pile Card Game: reviewed before play: pt was 80%accurate. 8/27/20: One Pile Card Game: Pt was 75% accurate.   (Pended)   -LB        Other Goals    Other Adult Goal- 1  Pt will improve RBANS Total to at least a 85 placing pt in the Low  Average range.   (Pended)   -LB     Status: Other Adult Goal- 1  Progressing as expected;Revised  (Pended)   -LB     Comments: Other Adult Goal- 1  3/25/21: RBANS Total Score of 78 falls in the [place] Range and pt was at 74. 1/21/21: RBANS Total score of 74 is down from 82 at time of last re-assessment.  11/5/30: RBANS  Total of 82 improved from a 77. 10/1/20: FROMAJE: Pt score a 13- Stage 4.3. 9/10/20: RBANS not re-administered this date, pt given SLUMS and scored a 19/30.   (Pended)   -LB     Other Adult Goal- 2  Pt will complete high level reasoning tasks and problem solving with at least 80% accuracy.  (Pended)   -LB     Status: Other Adult Goal- 2  New;Progressing as expected  (Pended)   -LB     Comments: Other Adult Goal- 2  12/17/20: Language and reasoning for game and pt was 90% accurate.  10/22/20: Deduction puzzle: pt was 70% accurate.    (Pended)   -LB        SLP Time Calculation    SLP Goal Re-Cert Due Date  04/24/21  (Pended)   -LB       User Key  (r) = Recorded By, (t) = Taken By, (c) = Cosigned By    Initials Name Provider Type    LB Florentin Wilcox, Speech Therapy Student Speech Therapy Student                         Time Calculation:   SLP Start Time: (P) 0900    Therapy Charges for Today     Code Description Service Date Service Provider Modifiers Qty    [id number]  ST TREATMENT SPEECH 4 4/8/2021 Florentin Wilcox, Speech Therapy Student GN 1                   Florentin Wilcox Speech Therapy Student  4/8/2021

## 2021-04-15 ENCOUNTER — HOSPITAL ENCOUNTER (OUTPATIENT)
Dept: SPEECH THERAPY | Facility: HOSPITAL | Age: 76
Setting detail: THERAPIES SERIES
Discharge: HOME | End: 2021-04-15

## 2021-04-15 DIAGNOSIS — G31.84 MILD COGNITIVE IMPAIRMENT: Primary | ICD-10-CM

## 2021-04-15 PROCEDURE — 92507 TX SP LANG VOICE COMM INDIV: CPT

## 2021-04-15 NOTE — THERAPY TREATMENT NOTE
Outpatient Speech Language Pathology   Adult Speech Language Cognitive Treatment Note  [facility]     Patient Name: Aj Marie  : 1945  MRN: [id number]  Today's Date: 4/15/2021         Visit Date: 04/15/2021   Patient Active Problem List   Diagnosis   • First degree atrioventricular block   • OA (osteoarthritis)   • Hearing loss   • Essential hypertension   • Candidate for statin therapy due to risk of future cardiovascular event   • Atypical chest pain   • Mild cognitive impairment          Visit Dx:    ICD-10-CM ICD-9-CM   1. Mild cognitive impairment  G31.84 331.83                         SLP OP Goals     Row Name 04/15/21 0900          Goal Type Needed    Goal Type Needed  Memory;Attention/Orientation;Other Adult Goals  -HG        Subjective Comments    Subjective Comments  Pt alert, cooperative, returned asking why he was coming. Pt did bring his notepad.   -HG        Memory Goals    Patient will be able to remember information needed to return to work and function on work-related tasks  90%:;with intermittent cues  -HG     Status: Patient will be able to remember information needed to return to work and function on work-related tasks  Progressing as expected  -HG     Comments: Patient will be able to remember information needed to return to work and function on work-related tasks  3/25/21: Pt continues to work with strategies in place.   -HG     Patient will demonstrate improved ability to recall information by immediately recalling a series of words  80%:;related;after delay;with cues  -HG     Status: Patient will demonstrate improved ability to recall information by immediately recalling a series of words  Progressing as expected  -HG     Comments: Patient will demonstrate improved ability to recall information by immediately recalling a series of words  4/15/21: Immediate recall of names and faces: pt was 70% accurate with use of various strategies. 3/25/21: RBANS Immediate recall and pt  was 87, improved from a 78. 3/18/21: Immediate recall of paragraphs 36-50 words: pt was 60% accurate I'ly and with use of chunking, pt was 80% accurate. 3/11/21: Immediate recall of shapes and figures: pt was 70% accurate. 2/25/21: Immediate recall for shapes and figures and pt was 70% accurate I'ly. 1/21/21: RBANS re-assess score of 78 places pt in the Borderline range, down from previous re-assessment.  11/19/20: Immediate recall of picture scene: pt was 70% accurate.  11/12/20: Immediate recall of a picture scene and pt was 70% accurate with min cues. 11/5/20: RBANS Immediate recall score: Pt improved to an 81 from a 73. 10/15/20: Immediate recall of visuospatial Constructional task and pt was 80% accurate with min cues. 10/1/20: ROMIE completed this date and pt did exhibit at least one significant error. 9/17/20: Immediate recall of word placement for 4 words: pt was 100% accurate. Hmwk for 5 words.  9/3/20: 4 related words from homework: pt was 2/4 I'ly. Moved to un-related pictures for visual ease and pt was 2/4. 8/27/20: 4 related words and pt was 1/4 and improved to 3/4 and 4/4 with mneumonic device.   -HG     Patient’s memory skills will be enhanced as reported by patient by utilizing internal memory strategies to recall up to 3 pieces of information after a 5- minute delay  80%:;with cues  -HG     Status: Patient’s memory skills will be enhanced as reported by patient by utilizing internal memory strategies to recall up to 3 pieces of information after a 5- minute delay  Progressing as expected  -HG     Comments: Patient’s memory skills will be enhanced as reported by patient by utilizing internal memory strategies to recall up to 3 pieces of information after a 5- minute delay  4/15/21: Pt delayed memory of 5 faces after 5 minute delay was 60%. 3/25/21: RBANS Delayed recall was 60 which remains consistent with last re-assessment score. 3/18/21: Pt was able to recall 6 un-related words in a sentence,  4/6, improved to 6/6 with use of strategies.  3/11/21: Pt was 6/6 with linking sentence in place.  3/4/21: Reviewed 5 words used in a sentence from previous session and pt was 5/5 with some hesitation. Reviewed 3 more times and pt was 5/5. Provided 6 words for hmwk.  2/25/21: 5 un-related words from previous session and pt required use of a fill in sentence but I'ly, pt was 3/5.  2/4/21: 5 un-related words: pt was 3/5 consistently even with use of picture and sentence. Words given for homework.   Pt did use a written strategy to remember this date where he parked but couldn't recall for sure if he wrote it or not. 1/21/21: RBANS re-assess score of Delayed  recall is at 60, Extremely low range, down from last re-assessment by 19 points.  12/17/20: Delayed recall of 5 un-related words: pt was 3/5 with use of visual prompts.  12/10/20: Delayed recall of 4 un-related words from homework and pt had no studied them and SLP reviewed and with a delay, pt was 3/4 consistently. 12/3/20: Delayed recall for 3 un-related and pt was 2/3 x 2 with review and with use of a sentence, pt was 3/3. 11/19/20: Delayed recall of 3 un-related words: pt was 2/3 with cues x 4. 11/12/20: Delayed recall of 8 related pictures from homework: pt was 8/8 x 2. 11/5/20: RBANS Delayed recall: pt was improved to a 79 from a 60.  10/29/20: Delayed recall of 6 related pictures: pt was 4/6. With use of first letter initials, pt was 6/6 accurate. 7th word added: pt was 7/7 x 3.   10/22/20: Delayed recall of 5 related pictures and pt was 5/5, 4/5, 5/5 x 2.  Added a sixth picture and pt was 5/6 . 10/15/20: Delayed recall of 4 related picture relative to pt and pt was 3/4 x 3. 10/1/20: FROMAJE: Delayed recall also exhibited at least one significant error. 9/17/20: 4 related words from homework and pt was 1/4- changed to 4 words that pertain to pt and he was 3/4.  9/10/20: 4 un-related pictures from homework: pt was 3/4.  2/4- moved to 4 related pictures and  "pt was 0/4 I'ly. With cues, pt was 3/4.   -HG     Patient’s memory skills will be enhanced as reported by patient by using external memory aides  80%:;with cues  -HG     Status: Patient’s memory skills will be enhanced as reported by patient by using external memory aides  Progressing as expected  -HG     Comments: Patient’s memory skills will be enhanced as reported by patient by using external memory aides  4/15/21: Pt notes he is not using his journal but is using his notebook to write down things that he has trouble remembering. 3/25/21: Pt continues to not use his journal. 3/18/21: Pt returned with his journal with a few notes in place. 3/11/21: Pt not using his journal and had no recall of where it was.   2/25/21: Pt given a small notepad for delayed recall to carry in his pocket since he is not writing consistently in his larger journal. 2/4/21:Increased education and reasoning for use of an external memory aide. Provided visual prompt to return with memory book next session. 1/21/21: Education with pt regarding importance of journaling whether on phone (shown lavon and strated an entry for this date) or in a paper journal due to low Delayed recall scores. 12/17/20: Pt reports writing in journal daily but did not return with journal to session. 12/3/20: Pt reports writing in journal every other day. 11/19/20: Pt states \"a little bit.\" 11/12/20: Education with pt for use of journal and bringing it to next therapy session.  11/5/20: Education and expectation of using his journal daily and returning to therapy with it next week. 10/29/20: cont'd education for use of daily journal. Provided a visual prompt.   10/15/20: Pt reports writing a little- pt states that he doesn't write very well.  10/1/20: Significant education with pt and wife regarding use of journal and/or notes lavon on phone. 9/17/20: Pt writing in journal but not in great detail and not consistent- education ongoing.  9/10/20: Pt stated that he has " "written in his journal a little. Provided visual prompts to remind pt to write in his journal. 9/3/20: Pt states that he journaling \"a little\". 8/27/20: Education for use of journal and the need for his delayed recall.   -HG     Patient will demonstrate improved ability to recall information by stating activities patient has completed that day  70%:;with cues  -HG     Status: Patient will demonstrate improved ability to recall information by stating activities patient has completed that day  Revised;Progressing as expected  -HG     Comments: Patient will demonstrate improved ability to recall information by stating activities patient has completed that day  4/15/21: Pt noted he woke up, ate breakfast, took his wife to an appointment, and then came to his SLP session. 3/25/21: Pt stated, \"I remember it now but when I get home or wherever I'm going, I don't remember.\" 3/4/21: Pt stated that he may not remember what he did during the day to record it at night and SLP advised pt not to wait and record it at night, record it sa soon or as close to the time of the event. 2/25/21: Pt could not recall where he parked and brought it up multiple times and was bothered by it. SLP provided pt with a small steno pad and he wrote it down. 1/21/21: Pt required cueing for his confusion of a morning and that he missed his first appt and was re-scheduled.  11/12/20: Pt states that he does the same thing every day. 10/22/20: When asked about his weekend, he was able to provide general information, not specifics of places or things seen. 10/1/20: Thorough education with pt and wife regarding use of journal. Pt admitted having to admit to his deficits. 8/27/20: Education for recall of what he has done throughout the day. Pt states that he never really worried about these things, \"That's why I had 2 secretaries.\"   -HG        Attention/Orientation Goals    Patient will be able to execute all activities necessary to manage a home  " Independently;With Cues  -HG     Status: Patient will be able to execute all activities necessary to manage a home  Progressing as expected  -HG     Comments: Patient will be able to execute all activities necessary to manage a home  4/15/21: Pt stated he is not having difficulties with remembering/executing daily tasks at home. 3/25/21: RBANS Attention score of 94 is improved from 91 on previous re-assessment. 1/21/21: RBANS re-assess Attention score of 91 is down from 103. 11/5/20: RBANS Attention Score: Improved to a 103 from an 88. 10/1/20: FROMANDERSONJE for attention and pt exhibited fast processing with minimal errors.   -HG     Patient will improve attention skills by sustaining consistent behavioral response during continuous and repetitive activity (e.g., listening for target words, auditory/reading comprehension tasks)  with cues;10 minute task  -HG     Status: Patient will improve attention skills by sustaining consistent behavioral response during continuous and repetitive activity (e.g., listening for target words, auditory/reading comprehension tasks)  Progressing as expected  -HG     Comments: Patient will improve attention skills by sustaining consistent behavioral response during continuous and repetitive activity (e.g., listening for target words, auditory/reading comprehension tasks)  3/4/21: Divided attention for answering questions from 22-36 word paragraphs while sorting cards by suit and pt was 80% accurate. 12/10/20: APT TEST: Pt scored above average for all areas except Divided attention and that was below average. Pt very concerned it can't remember more than he does. 10/22/20: Recognition of cards presented quickly: pt was nearly 100% accurate. 9/10/20: Card ID for a card that is one less than one before it:   -HG     Patient will improve attention skills by sustaining focus in order to actively hold and manipulate information provided (e.g., sequencing auditorily presented number series in  ascending or descending order)  90%:;with cues  -HG     Status: Patient will improve attention skills by sustaining focus in order to actively hold and manipulate information provided (e.g., sequencing auditorily presented number series in ascending or descending order)  Progressing as expected;Revised  -HG     Comments: Patient will improve attention skills by sustaining focus in order to actively hold and manipulate information provided (e.g., sequencing auditorily presented number series in ascending or descending order)  3/11/21: Digit Span and pt was up to 8 digits. With distraction cards, pt was 90% accurate. 12/3/20: Mental Manipulation for word progression: pt was 90% accurate.  10/22/20: Digit Span: pt continues to be successful with 6 digits. 7 digits: 6/7. 9/3/20: Digit Span: pt was able to recall up to 6 digits.   -HG     Patient will improve attention skills by alternating or shifting focus between two different tasks in order to complete both tasks  80%:;with cues  -HG     Status: Patient will improve attention skills by alternating or shifting focus between two different tasks in order to complete both tasks  Progressing as expected  -HG     Comments: Patient will improve attention skills by alternating or shifting focus between two different tasks in order to complete both tasks  3/18/21: Card sorting by N and suit and pt was 60% accurate this date and felt that it was harder and when it was re-targeted with less impulsivity, pt was 80% accurate. 3/4/21: Card sorting by N and suit and pt was 90% accurate. 12/10/20: Card Sorting by N and pt was less impulsive and used his own strategy for sorting and pt was 80% accurate. 11/12/20: Card sorting by N and suit: pt was 80-90% accurate with 2 run throughs.  10/22/20: Alternating attention for cards with N and into their suit and pt was 80% accurate. 9/3/20: Alternating attention for cards with N and sorting into suit and pt was 50% accurate.   -HG      Patient will improve attention skills by dividing focus and responding simultaneously to multiple tasks or in order to complete task  90%:;with cues  -HG     Status: Patient will improve attention skills by dividing focus and responding simultaneously to multiple tasks or in order to complete task  Progressing as expected;Revised  -HG     Comments: Patient will improve attention skills by dividing focus and responding simultaneously to multiple tasks or in order to complete task  3/4/21: Card game [place] and pt was 85% accurate. 12/3/20: Alternating attention for visual scanning and pt's impulsivity plays ar role but pt improved to 90% accurate.  10/29/20: Card sorting based on order per suit and high to low. 9/10/20: Divided attention for card sorting according to suit and specific date: pt was 90% accurate.   -HG     Patient will improve attention skills by sustaining focus to high-level cognitive tasks in order to complete task  80%:;with cues  -HG     Status: Patient will improve attention skills by sustaining focus to high-level cognitive tasks in order to complete task  Progressing as expected  -HG     Comments: Patient will improve attention skills by sustaining focus to high-level cognitive tasks in order to complete task  3/11/21: Visuospatial for shapes and figures and pt was 70% accurate. 2/4/21: Clock drawing and setting clock to accurate time: pt struggled initially with making the hour and minute hand the same length and with further instruction and cues, pt was 12/3/20: One Pile Card Game: pt was 80% accurate with mod cues. Pt continues to be impulsive when playing Independently and was 70% accurate.  11/19/20: One Pile Card Game: pt was impulsive and required mod cues and was 80% accurate.  10/15/20: One Pile Card Game: reviewed before play: pt was 80%accurate. 8/27/20: One Pile Card Game: Pt was 75% accurate.   -HG        Other Goals    Other Adult Goal- 1  Pt will improve RBANS Total  to at least a 85 placing pt in the Low  Average range.   -HG     Status: Other Adult Goal- 1  Progressing as expected;Revised  -HG     Comments: Other Adult Goal- 1  3/25/21: RBANS Total Score of 78 falls in the [place] Range and pt was at 74. 1/21/21: RBANS Total score of 74 is down from 82 at time of last re-assessment.  11/5/30: RBANS Total of 82 improved from a 77. 10/1/20: FROMAJE: Pt score a 13- Stage 4.3. 9/10/20: RBANS not re-administered this date, pt given SLUMS and scored a 19/30.   -HG     Other Adult Goal- 2  Pt will complete high level reasoning tasks and problem solving with at least 80% accuracy.  -HG     Status: Other Adult Goal- 2  New;Progressing as expected  -HG     Comments: Other Adult Goal- 2  12/17/20: Language and reasoning for game and pt was 90% accurate.  10/22/20: Deduction puzzle: pt was 70% accurate.    -HG        SLP Time Calculation    SLP Goal Re-Cert Due Date  04/24/21  -HG       User Key  (r) = Recorded By, (t) = Taken By, (c) = Cosigned By    Initials Name Provider Type    Candy Bello MS CCC-SLP Speech and Language Pathologist          OP SLP Education     Row Name 04/15/21 0900       Education    Education Comments  Education for use of his notepad for delayed recall and the use of association for recall as well.   -HG      User Key  (r) = Recorded By, (t) = Taken By, (c) = Cosigned By    Initials Name Effective Dates    Candy Bello MS CCC-SLP 08/09/20 -           OP SLP Assessment/Plan - 04/15/21 0900        SLP Plan    Plan Comments  Cont with Cog tx for 2 more sessions given limit per insurance.    -HG      User Key  (r) = Recorded By, (t) = Taken By, (c) = Cosigned By    Initials Name Provider Type    Candy Bello MS CCC-SLP Speech and Language Pathologist                 Time Calculation:   SLP Start Time: 0900    Therapy Charges for Today     Code Description Service Date Service Provider Modifiers Qty    [id number] HC ST TREATMENT SPEECH 4  4/15/2021 Candy Rubin, MS CCC-SLP GN 1                   Candy Rubin, MS CCC-SLP  4/15/2021

## 2021-04-22 ENCOUNTER — HOSPITAL ENCOUNTER (OUTPATIENT)
Dept: SPEECH THERAPY | Facility: HOSPITAL | Age: 76
Setting detail: THERAPIES SERIES
Discharge: HOME | End: 2021-04-22

## 2021-04-22 DIAGNOSIS — G31.84 MILD COGNITIVE IMPAIRMENT: Primary | ICD-10-CM

## 2021-04-22 PROCEDURE — 92507 TX SP LANG VOICE COMM INDIV: CPT

## 2021-04-22 NOTE — THERAPY PROGRESS REPORT/RE-CERT
Outpatient Speech Language Pathology   Adult Speech Language Cognitive Progress Note  [facility]     Patient Name: Aj Marie  : 1945  MRN: [id number]  Today's Date: 2021         Visit Date: 2021   Patient Active Problem List   Diagnosis   • First degree atrioventricular block   • OA (osteoarthritis)   • Hearing loss   • Essential hypertension   • Candidate for statin therapy due to risk of future cardiovascular event   • Atypical chest pain   • Mild cognitive impairment          Visit Dx:    ICD-10-CM ICD-9-CM   1. Mild cognitive impairment  G31.84 331.83                         SLP OP Goals     Row Name 21 0900          Goal Type Needed    Goal Type Needed  Memory;Attention/Orientation;Other Adult Goals  (Pended)   -LB        Subjective Comments    Subjective Comments  Pt alert and cooperative.  (Pended)   -LB        Memory Goals    Patient will be able to remember information needed to return to work and function on work-related tasks  90%:;with intermittent cues  (Pended)   -LB     Status: Patient will be able to remember information needed to return to work and function on work-related tasks  Progressing as expected  (Pended)   -LB     Comments: Patient will be able to remember information needed to return to work and function on work-related tasks  21: Pt notes he does not normally write things down when at work. 3/25/21: Pt continues to work with strategies in place.   (Pended)   -LB     Patient will demonstrate improved ability to recall information by immediately recalling a series of words  80%:;related;after delay;with cues  (Pended)   -LB     Status: Patient will demonstrate improved ability to recall information by immediately recalling a series of words  Progressing as expected  (Pended)   -LB     Comments: Patient will demonstrate improved ability to recall information by immediately recalling a series of words  21: SLUMS paragraph score /8. 4/15/21:  Immediate recall of names and faces: pt was 70% accurate with use of various strategies. 3/25/21: RBANS Immediate recall and pt was 87, improved from a 78. 3/18/21: Immediate recall of paragraphs 36-50 words: pt was 60% accurate I'ly and with use of chunking, pt was 80% accurate. 3/11/21: Immediate recall of shapes and figures: pt was 70% accurate. 2/25/21: Immediate recall for shapes and figures and pt was 70% accurate I'ly. 1/21/21: RBANS re-assess score of 78 places pt in the Borderline range, down from previous re-assessment.  11/19/20: Immediate recall of picture scene: pt was 70% accurate.  11/12/20: Immediate recall of a picture scene and pt was 70% accurate with min cues. 11/5/20: RBANS Immediate recall score: Pt improved to an 81 from a 73. 10/15/20: Immediate recall of visuospatial Constructional task and pt was 80% accurate with min cues. 10/1/20: ROMIE completed this date and pt did exhibit at least one significant error. 9/17/20: Immediate recall of word placement for 4 words: pt was 100% accurate. Hmwk for 5 words.  9/3/20: 4 related words from homework: pt was 2/4 I'ly. Moved to un-related pictures for visual ease and pt was 2/4. 8/27/20: 4 related words and pt was 1/4 and improved to 3/4 and 4/4 with mneumonic device.   (Pended)   -LB     Patient’s memory skills will be enhanced as reported by patient by utilizing internal memory strategies to recall up to 3 pieces of information after a 5- minute delay  80%:;with cues  (Pended)   -LB     Status: Patient’s memory skills will be enhanced as reported by patient by utilizing internal memory strategies to recall up to 3 pieces of information after a 5- minute delay  Progressing as expected  (Pended)   -LB     Comments: Patient’s memory skills will be enhanced as reported by patient by utilizing internal memory strategies to recall up to 3 pieces of information after a 5- minute delay  4/22/21: SLUMS delayed memory 1/5. 4/15/21: Pt delayed memory of 5  faces after 5 minute delay was 60%. 3/25/21: RBANS Delayed recall was 60 which remains consistent with last re-assessment score. 3/18/21: Pt was able to recall 6 un-related words in a sentence, 4/6, improved to 6/6 with use of strategies.  3/11/21: Pt was 6/6 with linking sentence in place.  3/4/21: Reviewed 5 words used in a sentence from previous session and pt was 5/5 with some hesitation. Reviewed 3 more times and pt was 5/5. Provided 6 words for hmwk.  2/25/21: 5 un-related words from previous session and pt required use of a fill in sentence but I'ly, pt was 3/5.  2/4/21: 5 un-related words: pt was 3/5 consistently even with use of picture and sentence. Words given for homework.   Pt did use a written strategy to remember this date where he parked but couldn't recall for sure if he wrote it or not. 1/21/21: RBANS re-assess score of Delayed  recall is at 60, Extremely low range, down from last re-assessment by 19 points.  12/17/20: Delayed recall of 5 un-related words: pt was 3/5 with use of visual prompts.  12/10/20: Delayed recall of 4 un-related words from homework and pt had no studied them and SLP reviewed and with a delay, pt was 3/4 consistently. 12/3/20: Delayed recall for 3 un-related and pt was 2/3 x 2 with review and with use of a sentence, pt was 3/3. 11/19/20: Delayed recall of 3 un-related words: pt was 2/3 with cues x 4. 11/12/20: Delayed recall of 8 related pictures from homework: pt was 8/8 x 2. 11/5/20: RBANS Delayed recall: pt was improved to a 79 from a 60.  10/29/20: Delayed recall of 6 related pictures: pt was 4/6. With use of first letter initials, pt was 6/6 accurate. 7th word added: pt was 7/7 x 3.   10/22/20: Delayed recall of 5 related pictures and pt was 5/5, 4/5, 5/5 x 2.  Added a sixth picture and pt was 5/6 . 10/15/20: Delayed recall of 4 related picture relative to pt and pt was 3/4 x 3. 10/1/20: ROMIE: Delayed recall also exhibited at least one significant error. 9/17/20: 4  "related words from homework and pt was 1/4- changed to 4 words that pertain to pt and he was 3/4.  9/10/20: 4 un-related pictures from homework: pt was 3/4.  2/4- moved to 4 related pictures and pt was 0/4 I'ly. With cues, pt was 3/4.   (Pended)   -LB     Patient’s memory skills will be enhanced as reported by patient by using external memory aides  80%:;with cues  (Pended)   -LB     Status: Patient’s memory skills will be enhanced as reported by patient by using external memory aides  Progressing as expected  (Pended)   -LB     Comments: Patient’s memory skills will be enhanced as reported by patient by using external memory aides  4/22/21: Pt states he is not using his journal. He states that he uses his calendar to remember events. 4/15/21: Pt notes he is not using his journal but is using his notebook to write down things that he has trouble remembering. 3/25/21: Pt continues to not use his journal. 3/18/21: Pt returned with his journal with a few notes in place. 3/11/21: Pt not using his journal and had no recall of where it was.   2/25/21: Pt given a small notepad for delayed recall to carry in his pocket since he is not writing consistently in his larger journal. 2/4/21:Increased education and reasoning for use of an external memory aide. Provided visual prompt to return with memory book next session. 1/21/21: Education with pt regarding importance of journaling whether on phone (shown lavon and strated an entry for this date) or in a paper journal due to low Delayed recall scores. 12/17/20: Pt reports writing in journal daily but did not return with journal to session. 12/3/20: Pt reports writing in journal every other day. 11/19/20: Pt states \"a little bit.\" 11/12/20: Education with pt for use of journal and bringing it to next therapy session.  11/5/20: Education and expectation of using his journal daily and returning to therapy with it next week. 10/29/20: cont'd education for use of daily journal. " "Provided a visual prompt.   10/15/20: Pt reports writing a little- pt states that he doesn't write very well.  10/1/20: Significant education with pt and wife regarding use of journal and/or notes lavon on phone. 9/17/20: Pt writing in journal but not in great detail and not consistent- education ongoing.  9/10/20: Pt stated that he has written in his journal a little. Provided visual prompts to remind pt to write in his journal. 9/3/20: Pt states that he journaling \"a little\". 8/27/20: Education for use of journal and the need for his delayed recall.   (Pended)   -LB     Patient will demonstrate improved ability to recall information by stating activities patient has completed that day  70%:;with cues  (Pended)   -LB     Status: Patient will demonstrate improved ability to recall information by stating activities patient has completed that day  Revised;Progressing as expected  (Pended)   -LB     Comments: Patient will demonstrate improved ability to recall information by stating activities patient has completed that day  4/22/21: Pt states he only took his wife to work today before coming to his session. 4/15/21: Pt noted he woke up, ate breakfast, took his wife to an appointment, and then came to his SLP session. 3/25/21: Pt stated, \"I remember it now but when I get home or wherever I'm going, I don't remember.\" 3/4/21: Pt stated that he may not remember what he did during the day to record it at night and SLP advised pt not to wait and record it at night, record it sa soon or as close to the time of the event. 2/25/21: Pt could not recall where he parked and brought it up multiple times and was bothered by it. SLP provided pt with a small steno pad and he wrote it down. 1/21/21: Pt required cueing for his confusion of a morning and that he missed his first appt and was re-scheduled.  11/12/20: Pt states that he does the same thing every day. 10/22/20: When asked about his weekend, he was able to provide general " "information, not specifics of places or things seen. 10/1/20: Thorough education with pt and wife regarding use of journal. Pt admitted having to admit to his deficits. 8/27/20: Education for recall of what he has done throughout the day. Pt states that he never really worried about these things, \"That's why I had 2 secretaries.\"   (Pended)   -LB        Attention/Orientation Goals    Patient will be able to execute all activities necessary to manage a home  Independently;With Cues  (Pended)   -LB     Status: Patient will be able to execute all activities necessary to manage a home  Progressing as expected  (Pended)   -LB     Comments: Patient will be able to execute all activities necessary to manage a home  4/15/21: Pt stated he is not having difficulties with remembering/executing daily tasks at home. 3/25/21: RBANS Attention score of 94 is improved from 91 on previous re-assessment. 1/21/21: RBANS re-assess Attention score of 91 is down from 103. 11/5/20: RBANS Attention Score: Improved to a 103 from an 88. 10/1/20: FROMAJE for attention and pt exhibited fast processing with minimal errors.   (Pended)   -LB     Patient will improve attention skills by sustaining consistent behavioral response during continuous and repetitive activity (e.g., listening for target words, auditory/reading comprehension tasks)  with cues;10 minute task  (Pended)   -LB     Status: Patient will improve attention skills by sustaining consistent behavioral response during continuous and repetitive activity (e.g., listening for target words, auditory/reading comprehension tasks)  Progressing as expected  (Pended)   -LB     Comments: Patient will improve attention skills by sustaining consistent behavioral response during continuous and repetitive activity (e.g., listening for target words, auditory/reading comprehension tasks)  4/22/21: Divided attention card game Up and Down for sorting diamonds descending and clubs ascending. Pt was 90% " accurate with moderate cues. 3/4/21: Divided attention for answering questions from 22-36 word paragraphs while sorting cards by suit and pt was 80% accurate. 12/10/20: APT TEST: Pt scored above average for all areas except Divided attention and that was below average. Pt very concerned it can't remember more than he does. 10/22/20: Recognition of cards presented quickly: pt was nearly 100% accurate. 9/10/20: Card ID for a card that is one less than one before it:   (Pended)   -LB     Patient will improve attention skills by sustaining focus in order to actively hold and manipulate information provided (e.g., sequencing auditorily presented number series in ascending or descending order)  90%:;with cues  (Pended)   -LB     Status: Patient will improve attention skills by sustaining focus in order to actively hold and manipulate information provided (e.g., sequencing auditorily presented number series in ascending or descending order)  Progressing as expected;Revised  (Pended)   -LB     Comments: Patient will improve attention skills by sustaining focus in order to actively hold and manipulate information provided (e.g., sequencing auditorily presented number series in ascending or descending order)  3/11/21: Digit Span and pt was up to 8 digits. With distraction cards, pt was 90% accurate. 12/3/20: Mental Manipulation for word progression: pt was 90% accurate.  10/22/20: Digit Span: pt continues to be successful with 6 digits. 7 digits: 6/7. 9/3/20: Digit Span: pt was able to recall up to 6 digits.   (Pended)   -LB     Patient will improve attention skills by alternating or shifting focus between two different tasks in order to complete both tasks  80%:;with cues  (Pended)   -LB     Status: Patient will improve attention skills by alternating or shifting focus between two different tasks in order to complete both tasks  Progressing as expected  (Pended)   -LB     Comments: Patient will improve attention skills by  alternating or shifting focus between two different tasks in order to complete both tasks  3/18/21: Card sorting by N and suit and pt was 60% accurate this date and felt that it was harder and when it was re-targeted with less impulsivity, pt was 80% accurate. 3/4/21: Card sorting by N and suit and pt was 90% accurate. 12/10/20: Card Sorting by N and pt was less impulsive and used his own strategy for sorting and pt was 80% accurate. 11/12/20: Card sorting by N and suit: pt was 80-90% accurate with 2 run throughs.  10/22/20: Alternating attention for cards with N and into their suit and pt was 80% accurate. 9/3/20: Alternating attention for cards with N and sorting into suit and pt was 50% accurate.   (Pended)   -LB     Patient will improve attention skills by dividing focus and responding simultaneously to multiple tasks or in order to complete task  90%:;with cues  (Pended)   -LB     Status: Patient will improve attention skills by dividing focus and responding simultaneously to multiple tasks or in order to complete task  Progressing as expected;Revised  (Pended)   -LB     Comments: Patient will improve attention skills by dividing focus and responding simultaneously to multiple tasks or in order to complete task  3/4/21: Card game [place] and pt was 85% accurate. 12/3/20: Alternating attention for visual scanning and pt's impulsivity plays ar role but pt improved to 90% accurate.  10/29/20: Card sorting based on order per suit and high to low. 9/10/20: Divided attention for card sorting according to suit and specific date: pt was 90% accurate.   (Pended)   -LB     Patient will improve attention skills by sustaining focus to high-level cognitive tasks in order to complete task  80%:;with cues  (Pended)   -LB     Status: Patient will improve attention skills by sustaining focus to high-level cognitive tasks in order to complete task  Progressing as expected  (Pended)   -LB     Comments: Patient will  improve attention skills by sustaining focus to high-level cognitive tasks in order to complete task  3/11/21: Visuospatial for shapes and figures and pt was 70% accurate. 2/4/21: Clock drawing and setting clock to accurate time: pt struggled initially with making the hour and minute hand the same length and with further instruction and cues, pt was 12/3/20: One Pile Card Game: pt was 80% accurate with mod cues. Pt continues to be impulsive when playing Independently and was 70% accurate.  11/19/20: One Pile Card Game: pt was impulsive and required mod cues and was 80% accurate.  10/15/20: One Pile Card Game: reviewed before play: pt was 80%accurate. 8/27/20: One Pile Card Game: Pt was 75% accurate.   (Pended)   -LB        Other Goals    Other Adult Goal- 1  Pt will improve RBANS Total to at least a 85 placing pt in the Low  Average range.   (Pended)   -LB     Status: Other Adult Goal- 1  Progressing as expected;Revised  (Pended)   -LB     Comments: Other Adult Goal- 1  4/22/21: SLUMS score of 20 places pt in Dementia range. 3/25/21: RBANS Total Score of 78 falls in the [place] Range and pt was at 74. 1/21/21: RBANS Total score of 74 is down from 82 at time of last re-assessment.  11/5/30: RBANS Total of 82 improved from a 77. 10/1/20: FROMAJE: Pt score a 13- Stage 4.3. 9/10/20: RBANS not re-administered this date, pt given SLUMS and scored a 19/30.   (Pended)   -LB     Other Adult Goal- 2  Pt will complete high level reasoning tasks and problem solving with at least 80% accuracy.  (Pended)   -LB     Status: Other Adult Goal- 2  New;Progressing as expected  (Pended)   -LB     Comments: Other Adult Goal- 2  12/17/20: Language and reasoning for game and pt was 90% accurate.  10/22/20: Deduction puzzle: pt was 70% accurate.    (Pended)   -LB        SLP Time Calculation    SLP Goal Re-Cert Due Date  05/22/21  (Pended)   -LB       User Key  (r) = Recorded By, (t) = Taken By, (c) = Cosigned By    Initials Name Provider  Type    LB Florentin Wilcox, Speech Therapy Student Speech Therapy Student          OP SLP Education     Row Name 04/22/21 0900       Education    Education Comments  Education for exercise and memory loss provided during session. Pt noted he would like to exercise more to increase brain function.  (Pended)   -LB      User Key  (r) = Recorded By, (t) = Taken By, (c) = Cosigned By    Initials Name Effective Dates    LB Wilcox Florentin, Speech Therapy Student 03/09/21 -           OP SLP Assessment/Plan - 04/22/21 0900        SLP Assessment    Functional Problems  Speech Language- Adult/Cognition  (Pended)    -LB    Impact on Function: Adult Speech Language/Cognition  Trouble learning or remembering new information;Requires supervision  (Pended)    -LB    Clinical Impression: Speech Language-Adult/Congnition  Moderate-Severe:;Cognitive Communication Impairment  (Pended)    -LB    Functional Problems Comment  Pt reports having difficulty remembering information such as where he parked his car.  (Pended)    -LB    Clinical Impression Comments  SLUMS score of 20 place pt in the Dementia range.  (Pended)    -LB    Please refer to paper survey for additional self-reported information  Yes  (Pended)    -LB    Please refer to items scanned into chart for additional diagnostic informaiton and handouts as provided by clinician  Yes  (Pended)    -LB    SLP Diagnosis  Moderate-severe cognitive communication impairment.  (Pended)    -LB    Prognosis  Excellent (comment)  (Pended)    -LB    Patient/caregiver participated in establishment of treatment plan and goals  Yes  (Pended)    -LB    Patient would benefit from skilled therapy intervention  Yes  (Pended)    -LB       SLP Plan    Frequency  1-2x/week  (Pended)    -LB    Duration  4 weeks  (Pended)    -LB    Planned CPT's?  SLP INDIVIDUAL SPEECH THERAPY: 30984  (Pended)    -LB    Expected Duration of Therapy Session (SLP Eval)  60  (Pended)    -LB    Plan Comments  Cont cog tx   (Pended)    -KATHY      User Key  (r) = Recorded By, (t) = Taken By, (c) = Cosigned By    Initials Name Provider Type    Florentin Menjivar Speech Therapy Student Speech Therapy Student                 Time Calculation:   SLP Start Time: (P) 0900    Therapy Charges for Today     Code Description Service Date Service Provider Modifiers Qty    [id number]  ST TREATMENT SPEECH 5 4/22/2021 Florentin Wilcox, Speech Therapy Student GN 1                   Florentin Wilcox Speech Therapy Student  4/22/2021

## 2021-05-06 ENCOUNTER — HOSPITAL ENCOUNTER (OUTPATIENT)
Dept: SPEECH THERAPY | Facility: HOSPITAL | Age: 76
Setting detail: THERAPIES SERIES
Discharge: HOME | End: 2021-05-06

## 2021-05-06 DIAGNOSIS — G31.84 MILD COGNITIVE IMPAIRMENT: Primary | ICD-10-CM

## 2021-05-06 PROCEDURE — 92507 TX SP LANG VOICE COMM INDIV: CPT

## 2021-05-06 NOTE — THERAPY PROGRESS REPORT/RE-CERT
Outpatient Speech Language Pathology   Adult Speech Language Cognitive Progress Note  [facility]     Patient Name: Aj Marie  : 1945  MRN: [id number]  Today's Date: 2021         Visit Date: 2021   Patient Active Problem List   Diagnosis   • First degree atrioventricular block   • OA (osteoarthritis)   • Hearing loss   • Essential hypertension   • Candidate for statin therapy due to risk of future cardiovascular event   • Atypical chest pain   • Mild cognitive impairment          Visit Dx:    ICD-10-CM ICD-9-CM   1. Mild cognitive impairment  G31.84 331.83       SLP SLC Evaluation - 21 0900        Communication Assessment/Intervention    Document Type  progress note/recertification  (Pended)    -LB    Subjective Information  no complaints  (Pended)    -LB    Patient Observations  alert;agree to therapy;cooperative  (Pended)    -LB    Patient/Family/Caregiver Comments/Observations  No family present  (Pended)    -LB    Patient Effort  excellent  (Pended)    -LB    Symptoms Noted During/After Treatment  none  (Pended)    -LB       General Information    Patient Profile Reviewed  yes  (Pended)    -LB       Auditory Comprehension Assessment/Intervention    Auditory Comprehension (Communication)  WFL  (Pended)    -LB       Expression Assessment/Intervention    Expression Assessment/Intervention  verbal expression  (Pended)    -LB       Verbal Expression Assessment/Intervention    Verbal Expression  WFL  (Pended)    -LB       Cognitive Assessment Intervention- SLP    Cognitive Function (Cognition)  mild impairment;moderate impairment  (Pended)    -LB    Orientation Status (Cognition)  awareness of basic personal information;person;place;situation;WFL;time;mild impairment  (Pended)    -LB    Memory (Cognitive)  delayed;mild impairment;moderate impairment  (Pended)    -LB    Attention (Cognitive)  divided;mild impairment;selective;sustained;alternating;WFL  (Pended)    -LB    Thought  "Organization (Cognitive)  WFL  (Pended)    -LB    Reasoning (Cognitive)  WFL  (Pended)    -LB       Standardized Tests    Cognitive/Memory Tests  MOCA: [place] Cognitive Assessment  (Pended)    -LB       MOCA: The [place] Cognitive Assessment    MOCA Total Score  21  (Pended)    -LB    MOCA Total Score Indicative Of:  Mild Cognitive Impairment  (Pended)    -LB      User Key  (r) = Recorded By, (t) = Taken By, (c) = Cosigned By    Initials Name Provider Type    Florentin Menjivar Speech Therapy Student Speech Therapy Student                        SLP OP Goals     Row Name 05/06/21 0900          Goal Type Needed    Goal Type Needed  Memory;Attention/Orientation;Other Adult Goals  (Pended)   -LB        Subjective Comments    Subjective Comments  Pt alert and cooperative.  (Pended)   -LB        Memory Goals    Patient will be able to remember information needed to return to work and function on work-related tasks  90%:;with intermittent cues  (Pended)   -LB     Status: Patient will be able to remember information needed to return to work and function on work-related tasks  Progressing as expected  (Pended)   -LB     Comments: Patient will be able to remember information needed to return to work and function on work-related tasks  5/6/21: Pt reports he is not writing things down because \"I don't need to... I don't forget much.\" Pt states he uses a piece of paper to write things down if he needs to remember multiple tasks. 4/22/21: Pt notes he does not normally write things down when at work. 3/25/21: Pt continues to work with strategies in place.   (Pended)   -LB     Patient will demonstrate improved ability to recall information by immediately recalling a series of words  80%:;related;after delay;with cues  (Pended)   -LB     Status: Patient will demonstrate improved ability to recall information by immediately recalling a series of words  Progressing as expected  (Pended)   -LB     Comments: Patient will demonstrate " improved ability to recall information by immediately recalling a series of words  5/6/21: MOCA immediate recall of 5 words was 100% independently. 4/22/21: SLUMS paragraph score 4/8. 4/15/21: Immediate recall of names and faces: pt was 70% accurate with use of various strategies. 3/25/21: RBANS Immediate recall and pt was 87, improved from a 78. 3/18/21: Immediate recall of paragraphs 36-50 words: pt was 60% accurate I'ly and with use of chunking, pt was 80% accurate. 3/11/21: Immediate recall of shapes and figures: pt was 70% accurate. 2/25/21: Immediate recall for shapes and figures and pt was 70% accurate I'ly. 1/21/21: RBANS re-assess score of 78 places pt in the Borderline range, down from previous re-assessment.  11/19/20: Immediate recall of picture scene: pt was 70% accurate.  11/12/20: Immediate recall of a picture scene and pt was 70% accurate with min cues. 11/5/20: RBANS Immediate recall score: Pt improved to an 81 from a 73. 10/15/20: Immediate recall of visuospatial Constructional task and pt was 80% accurate with min cues. 10/1/20: ROMIE completed this date and pt did exhibit at least one significant error. 9/17/20: Immediate recall of word placement for 4 words: pt was 100% accurate. Hmwk for 5 words.  9/3/20: 4 related words from homework: pt was 2/4 I'ly. Moved to un-related pictures for visual ease and pt was 2/4. 8/27/20: 4 related words and pt was 1/4 and improved to 3/4 and 4/4 with mneumonic device.   (Pended)   -LB     Patient’s memory skills will be enhanced as reported by patient by utilizing internal memory strategies to recall up to 3 pieces of information after a 5- minute delay  80%:;with cues  (Pended)   -LB     Status: Patient’s memory skills will be enhanced as reported by patient by utilizing internal memory strategies to recall up to 3 pieces of information after a 5- minute delay  Progressing as expected  (Pended)   -LB     Comments: Patient’s memory skills will be enhanced as  reported by patient by utilizing internal memory strategies to recall up to 3 pieces of information after a 5- minute delay  5/6/21: MOCA delayed recall of 5 words was 0/5 independently. 4/22/21: SLUMS delayed memory 1/5. 4/15/21: Pt delayed memory of 5 faces after 5 minute delay was 60%. 3/25/21: RBANS Delayed recall was 60 which remains consistent with last re-assessment score. 3/18/21: Pt was able to recall 6 un-related words in a sentence, 4/6, improved to 6/6 with use of strategies.  3/11/21: Pt was 6/6 with linking sentence in place.  3/4/21: Reviewed 5 words used in a sentence from previous session and pt was 5/5 with some hesitation. Reviewed 3 more times and pt was 5/5. Provided 6 words for hmwk.  2/25/21: 5 un-related words from previous session and pt required use of a fill in sentence but I'ly, pt was 3/5.  2/4/21: 5 un-related words: pt was 3/5 consistently even with use of picture and sentence. Words given for homework.   Pt did use a written strategy to remember this date where he parked but couldn't recall for sure if he wrote it or not. 1/21/21: RBANS re-assess score of Delayed  recall is at 60, Extremely low range, down from last re-assessment by 19 points.  12/17/20: Delayed recall of 5 un-related words: pt was 3/5 with use of visual prompts.  12/10/20: Delayed recall of 4 un-related words from homework and pt had no studied them and SLP reviewed and with a delay, pt was 3/4 consistently. 12/3/20: Delayed recall for 3 un-related and pt was 2/3 x 2 with review and with use of a sentence, pt was 3/3. 11/19/20: Delayed recall of 3 un-related words: pt was 2/3 with cues x 4. 11/12/20: Delayed recall of 8 related pictures from homework: pt was 8/8 x 2. 11/5/20: RBANS Delayed recall: pt was improved to a 79 from a 60.  10/29/20: Delayed recall of 6 related pictures: pt was 4/6. With use of first letter initials, pt was 6/6 accurate. 7th word added: pt was 7/7 x 3.   10/22/20: Delayed recall of 5 related  "pictures and pt was 5/5, 4/5, 5/5 x 2.  Added a sixth picture and pt was 5/6 . 10/15/20: Delayed recall of 4 related picture relative to pt and pt was 3/4 x 3. 10/1/20: FROMAJE: Delayed recall also exhibited at least one significant error. 9/17/20: 4 related words from homework and pt was 1/4- changed to 4 words that pertain to pt and he was 3/4.  9/10/20: 4 un-related pictures from homework: pt was 3/4.  2/4- moved to 4 related pictures and pt was 0/4 I'ly. With cues, pt was 3/4.   (Pended)   -LB     Patient’s memory skills will be enhanced as reported by patient by using external memory aides  80%:;with cues  (Pended)   -LB     Status: Patient’s memory skills will be enhanced as reported by patient by using external memory aides  Progressing as expected  (Pended)   -LB     Comments: Patient’s memory skills will be enhanced as reported by patient by using external memory aides  4/22/21: Pt states he is not using his journal. He states that he uses his calendar to remember events. 4/15/21: Pt notes he is not using his journal but is using his notebook to write down things that he has trouble remembering. 3/25/21: Pt continues to not use his journal. 3/18/21: Pt returned with his journal with a few notes in place. 3/11/21: Pt not using his journal and had no recall of where it was.   2/25/21: Pt given a small notepad for delayed recall to carry in his pocket since he is not writing consistently in his larger journal. 2/4/21:Increased education and reasoning for use of an external memory aide. Provided visual prompt to return with memory book next session. 1/21/21: Education with pt regarding importance of journaling whether on phone (shown lavon and strated an entry for this date) or in a paper journal due to low Delayed recall scores. 12/17/20: Pt reports writing in journal daily but did not return with journal to session. 12/3/20: Pt reports writing in journal every other day. 11/19/20: Pt states \"a little bit.\" " "11/12/20: Education with pt for use of journal and bringing it to next therapy session.  11/5/20: Education and expectation of using his journal daily and returning to therapy with it next week. 10/29/20: cont'd education for use of daily journal. Provided a visual prompt.   10/15/20: Pt reports writing a little- pt states that he doesn't write very well.  10/1/20: Significant education with pt and wife regarding use of journal and/or notes lavon on phone. 9/17/20: Pt writing in journal but not in great detail and not consistent- education ongoing.  9/10/20: Pt stated that he has written in his journal a little. Provided visual prompts to remind pt to write in his journal. 9/3/20: Pt states that he journaling \"a little\". 8/27/20: Education for use of journal and the need for his delayed recall.   (Pended)   -LB     Patient will demonstrate improved ability to recall information by stating activities patient has completed that day  70%:;with cues  (Pended)   -LB     Status: Patient will demonstrate improved ability to recall information by stating activities patient has completed that day  Revised;Progressing as expected  (Pended)   -LB     Comments: Patient will demonstrate improved ability to recall information by stating activities patient has completed that day  5/6/21: Pt states he woke up, went to work, came home, and worked on crossword puzzles yesterday. 4/22/21: Pt states he only took his wife to work today before coming to his session. 4/15/21: Pt noted he woke up, ate breakfast, took his wife to an appointment, and then came to his SLP session. 3/25/21: Pt stated, \"I remember it now but when I get home or wherever I'm going, I don't remember.\" 3/4/21: Pt stated that he may not remember what he did during the day to record it at night and SLP advised pt not to wait and record it at night, record it sa soon or as close to the time of the event. 2/25/21: Pt could not recall where he parked and brought it up " "multiple times and was bothered by it. SLP provided pt with a small steno pad and he wrote it down. 1/21/21: Pt required cueing for his confusion of a morning and that he missed his first appt and was re-scheduled.  11/12/20: Pt states that he does the same thing every day. 10/22/20: When asked about his weekend, he was able to provide general information, not specifics of places or things seen. 10/1/20: Thorough education with pt and wife regarding use of journal. Pt admitted having to admit to his deficits. 8/27/20: Education for recall of what he has done throughout the day. Pt states that he never really worried about these things, \"That's why I had 2 secretaries.\"   (Pended)   -LB        Attention/Orientation Goals    Patient will be able to execute all activities necessary to manage a home  Independently;With Cues  (Pended)   -LB     Status: Patient will be able to execute all activities necessary to manage a home  Progressing as expected  (Pended)   -LB     Comments: Patient will be able to execute all activities necessary to manage a home  4/15/21: Pt stated he is not having difficulties with remembering/executing daily tasks at home. 3/25/21: RBANS Attention score of 94 is improved from 91 on previous re-assessment. 1/21/21: RBANS re-assess Attention score of 91 is down from 103. 11/5/20: RBANS Attention Score: Improved to a 103 from an 88. 10/1/20: FROMAJE for attention and pt exhibited fast processing with minimal errors.   (Pended)   -LB     Patient will improve attention skills by sustaining consistent behavioral response during continuous and repetitive activity (e.g., listening for target words, auditory/reading comprehension tasks)  with cues;10 minute task  (Pended)   -LB     Status: Patient will improve attention skills by sustaining consistent behavioral response during continuous and repetitive activity (e.g., listening for target words, auditory/reading comprehension tasks)  Progressing as " expected  (Pended)   -LB     Comments: Patient will improve attention skills by sustaining consistent behavioral response during continuous and repetitive activity (e.g., listening for target words, auditory/reading comprehension tasks)  4/22/21: Divided attention card game Up and Down for sorting diamonds descending and clubs ascending. Pt was 90% accurate with moderate cues. 3/4/21: Divided attention for answering questions from 22-36 word paragraphs while sorting cards by suit and pt was 80% accurate. 12/10/20: APT TEST: Pt scored above average for all areas except Divided attention and that was below average. Pt very concerned it can't remember more than he does. 10/22/20: Recognition of cards presented quickly: pt was nearly 100% accurate. 9/10/20: Card ID for a card that is one less than one before it:   (Pended)   -LB     Patient will improve attention skills by sustaining focus in order to actively hold and manipulate information provided (e.g., sequencing auditorily presented number series in ascending or descending order)  90%:;with cues  (Pended)   -LB     Status: Patient will improve attention skills by sustaining focus in order to actively hold and manipulate information provided (e.g., sequencing auditorily presented number series in ascending or descending order)  Progressing as expected;Revised  (Pended)   -LB     Comments: Patient will improve attention skills by sustaining focus in order to actively hold and manipulate information provided (e.g., sequencing auditorily presented number series in ascending or descending order)  3/11/21: Digit Span and pt was up to 8 digits. With distraction cards, pt was 90% accurate. 12/3/20: Mental Manipulation for word progression: pt was 90% accurate.  10/22/20: Digit Span: pt continues to be successful with 6 digits. 7 digits: 6/7. 9/3/20: Digit Span: pt was able to recall up to 6 digits.   (Pended)   -LB     Patient will improve attention skills by alternating  or shifting focus between two different tasks in order to complete both tasks  80%:;with cues  (Pended)   -LB     Status: Patient will improve attention skills by alternating or shifting focus between two different tasks in order to complete both tasks  Progressing as expected  (Pended)   -LB     Comments: Patient will improve attention skills by alternating or shifting focus between two different tasks in order to complete both tasks  3/18/21: Card sorting by N and suit and pt was 60% accurate this date and felt that it was harder and when it was re-targeted with less impulsivity, pt was 80% accurate. 3/4/21: Card sorting by N and suit and pt was 90% accurate. 12/10/20: Card Sorting by N and pt was less impulsive and used his own strategy for sorting and pt was 80% accurate. 11/12/20: Card sorting by N and suit: pt was 80-90% accurate with 2 run throughs.  10/22/20: Alternating attention for cards with N and into their suit and pt was 80% accurate. 9/3/20: Alternating attention for cards with N and sorting into suit and pt was 50% accurate.   (Pended)   -LB     Patient will improve attention skills by dividing focus and responding simultaneously to multiple tasks or in order to complete task  90%:;with cues  (Pended)   -LB     Status: Patient will improve attention skills by dividing focus and responding simultaneously to multiple tasks or in order to complete task  Progressing as expected;Revised  (Pended)   -LB     Comments: Patient will improve attention skills by dividing focus and responding simultaneously to multiple tasks or in order to complete task  3/4/21: Card game [place] and pt was 85% accurate. 12/3/20: Alternating attention for visual scanning and pt's impulsivity plays ar role but pt improved to 90% accurate.  10/29/20: Card sorting based on order per suit and high to low. 9/10/20: Divided attention for card sorting according to suit and specific date: pt was 90% accurate.   (Pended)    -LB     Patient will improve attention skills by sustaining focus to high-level cognitive tasks in order to complete task  80%:;with cues  (Pended)   -LB     Status: Patient will improve attention skills by sustaining focus to high-level cognitive tasks in order to complete task  Progressing as expected  (Pended)   -LB     Comments: Patient will improve attention skills by sustaining focus to high-level cognitive tasks in order to complete task  3/11/21: Visuospatial for shapes and figures and pt was 70% accurate. 2/4/21: Clock drawing and setting clock to accurate time: pt struggled initially with making the hour and minute hand the same length and with further instruction and cues, pt was 12/3/20: One Pile Card Game: pt was 80% accurate with mod cues. Pt continues to be impulsive when playing Independently and was 70% accurate.  11/19/20: One Pile Card Game: pt was impulsive and required mod cues and was 80% accurate.  10/15/20: One Pile Card Game: reviewed before play: pt was 80%accurate. 8/27/20: One Pile Card Game: Pt was 75% accurate.   (Pended)   -LB        Other Goals    Other Adult Goal- 1  Pt will improve RBANS Total to at least a 85 placing pt in the Low  Average range.   (Pended)   -LB     Status: Other Adult Goal- 1  Progressing as expected;Revised  (Pended)   -LB     Comments: Other Adult Goal- 1  5/6/21: MOCA total score of 21 places pt in mild cognitive impairment range. 4/22/21: SLUMS score of 20 places pt in Dementia range. 3/25/21: RBANS Total Score of 78 falls in the [place] Range and pt was at 74. 1/21/21: RBANS Total score of 74 is down from 82 at time of last re-assessment.  11/5/30: RBANS Total of 82 improved from a 77. 10/1/20: FROMAJE: Pt score a 13- Stage 4.3. 9/10/20: RBANS not re-administered this date, pt given SLUMS and scored a 19/30.   (Pended)   -LB     Other Adult Goal- 2  Pt will complete high level reasoning tasks and problem solving with at least 80% accuracy.  (Pended)   -LB   "   Status: Other Adult Goal- 2  New;Progressing as expected  (Pended)   -LB     Comments: Other Adult Goal- 2  12/17/20: Language and reasoning for game and pt was 90% accurate.  10/22/20: Deduction puzzle: pt was 70% accurate.    (Pended)   -LB        SLP Time Calculation    SLP Goal Re-Cert Due Date  06/05/21  (Pended)   -LB       User Key  (r) = Recorded By, (t) = Taken By, (c) = Cosigned By    Initials Name Provider Type    Florentin Menjivar Speech Therapy Student Speech Therapy Student        OP SLP Education     Row Name 05/06/21 0900       Education    Education Comments  Education for cognitive impairment and attention provided using current and prior assessment data.   (Pended)   -LB      User Key  (r) = Recorded By, (t) = Taken By, (c) = Cosigned By    Initials Name Effective Dates    LB Florentin Wilcox Speech Therapy Student 03/09/21 -         OP SLP Assessment/Plan - 05/06/21 0900        SLP Assessment    Functional Problems  Speech Language- Adult/Cognition  (Pended)    -LB    Impact on Function: Adult Speech Language/Cognition  Trouble learning or remembering new information  (Pended)    -LB    Clinical Impression: Speech Language-Adult/Congnition  Mild-Moderate:;Cognitive Communication Impairment  (Pended)    -LB    Functional Problems Comment  Pt reports having difficulty remembering. Pt states that sometimes \"I won't remember I don't remember until the end of the day.\"  (Pended)    -LB    Clinical Impression Comments  MOCA score of 21 places pt in Mild Cognitive Impairment range.   (Pended)    -LB    Please refer to paper survey for additional self-reported information  Yes  (Pended)    -LB    Please refer to items scanned into chart for additional diagnostic informaiton and handouts as provided by clinician  Yes  (Pended)    -LB    SLP Diagnosis  Mild-moderate cognitive communication impairment  (Pended)    -LB    Prognosis  Excellent (comment)  (Pended)    -LB    Patient/caregiver participated in " establishment of treatment plan and goals  Yes  (Pended)    -LB    Patient would benefit from skilled therapy intervention  Yes  (Pended)    -LB       SLP Plan    Frequency  1-2x/week  (Pended)    -LB    Duration  4 weeks  (Pended)    -LB    Planned CPT's?  SLP INDIVIDUAL SPEECH THERAPY: 41315  (Pended)    -LB    Expected Duration of Therapy Session (SLP Eval)  60  (Pended)    -LB    Plan Comments  Cont cog tx  (Pended)    -LB      User Key  (r) = Recorded By, (t) = Taken By, (c) = Cosigned By    Initials Name Provider Type    Florentin Menjivar, Speech Therapy Student Speech Therapy Student               Time Calculation:   SLP Start Time: (P) 0900  Untimed Charges  19636-HT Treatment/ST Modification Prosth Aug Alter : (P) 60  Total Minutes  Untimed Charges Total Minutes: (P) 60   Total Minutes: (P) 60    Therapy Charges for Today     Code Description Service Date Service Provider Modifiers Qty    [id number]  ST TREATMENT SPEECH 4 5/6/2021 Florentin Wilcox, Speech Therapy Student GN 1                   Florentin Wilcox Speech Therapy Student  5/6/2021

## 2021-05-27 NOTE — PROGRESS NOTES
Encounter Date:10/30/2018    Patient ID: Aj Marie is a  73 y.o. male who lives in [place], [place]. He is a retired [employer]  and is partly retired from his work at [employer].    CC/Reason for visit:  Chest Pain (Consult ) and Shortness of Breath            Aj Marie is referred to the office today by Juliana Chavez for re-evaluation of his chest pain. The patient was previously evaluated for similar symptoms in 2016 1 by my partner Willy Cortse, at which time he underwent a stress test which was unremarkable. The patient over the last several months is had left-sided precordial chest pain symptoms which can be reproduced with palpation of the chest wall. He also has other chest discomfort underneath his left pectoral muscle. This discomfort happens at rest and is not exacerbated with exertion. The patient has issues with hip pain and back pain but does exercise on a stationary bike on a routine basis without development of symptoms.    Review of Systems   Constitution: Positive for malaise/fatigue. Negative for weakness.   HENT: Positive for hearing loss.    Eyes: Positive for blurred vision. Negative for vision loss in left eye and vision loss in right eye.   Cardiovascular: Positive for chest pain. Negative for dyspnea on exertion, near-syncope, orthopnea, palpitations, paroxysmal nocturnal dyspnea and syncope.   Respiratory: Positive for shortness of breath.    Musculoskeletal: Positive for arthritis and muscle weakness. Negative for myalgias.   Neurological: Positive for numbness. Negative for brief paralysis, excessive daytime sleepiness, focal weakness and paresthesias.   All other systems reviewed and are negative.      The patient's past medical, social, family history and ROS reviewed in the patient's electronic medical record.    Allergies  Penicillins    Outpatient Prescriptions Marked as Taking for the 10/30/18 encounter (Consult) with Hubert Woodruff  "MD PILAR   Medication Sig Dispense Refill   • aspirin 81 MG chewable tablet Chew 81 mg Daily.     • Multiple Vitamins-Minerals (MULTIVITAMIN ADULT PO) Take  by mouth Daily.     • tamsulosin (FLOMAX) 0.4 MG capsule 24 hr capsule Daily.     • [DISCONTINUED] co-enzyme Q-10 30 MG capsule      • [DISCONTINUED] FOLIC ACID PO      • [DISCONTINUED] lisinopril (PRINIVIL,ZESTRIL) 10 MG tablet Take 10 mg by mouth Daily.           Blood pressure 128/82, pulse 63, height 168.9 cm (66.5\"), weight 91.4 kg (201 lb 9.6 oz).  Body mass index is 32.05 kg/m².       Physical Exam   Constitutional: He is oriented to person, place, and time. He appears well-developed and well-nourished.   HENT:   Head: Normocephalic and atraumatic.   Eyes: Pupils are equal, round, and reactive to light. No scleral icterus.   Neck: No JVD present. Carotid bruit is not present. No thyromegaly present.   Cardiovascular: Normal rate, regular rhythm, S1 normal and S2 normal.  Exam reveals no gallop.    No murmur heard.  Pulmonary/Chest: Effort normal and breath sounds normal.   Abdominal: Soft. There is no hepatosplenomegaly. There is no tenderness.   Neurological: He is alert and oriented to person, place, and time.   Skin: Skin is warm and dry. No cyanosis. Nails show no clubbing.   Psychiatric: He has a normal mood and affect. His behavior is normal.       Data Review:       ECG 12 Lead  Date/Time: 10/30/2018 1:35 PM  Performed by: SANTY LOMAX IV  Authorized by: SANTY LOMAX IV   Rhythm: sinus rhythm  Conduction: 1st degree  Other findings: PRWP  Comments: Borderline inferior Q waves  Poor R-wave progression in the precordial leads            Lab Results   Component Value Date    AST 25 11/20/2016    ALT 23 11/20/2016     Lab Results   Component Value Date    GLUCOSE 102 (H) 11/20/2016    BUN 22 11/20/2016    CREATININE 1.20 11/20/2016    EGFRIFNONA 59 (L) 11/20/2016    BCR 18.3 11/20/2016    K 3.8 11/20/2016    CO2 26.0 11/20/2016 "    CALCIUM 9.3 11/20/2016    ALBUMIN 4.00 11/20/2016    AST 25 11/20/2016    ALT 23 11/20/2016     Lab Results   Component Value Date    WBC 7.09 11/20/2016    HGB 15.1 11/20/2016    HCT 44.5 11/20/2016    MCV 87.6 11/20/2016     (L) 11/20/2016          Problem List Items Addressed This Visit        Cardiovascular and Mediastinum    Essential hypertension    Current Assessment & Plan     · Controlled  · Continue present medical therapy         First degree atrioventricular block (Chronic)    Current Assessment & Plan     · Asymptomatic            Nervous and Auditory    Atypical chest pain - Primary    Overview     · Exercise echo (3/31/16): Exercise for 7 minutes. No ischemia by EKG or echo criteria         Current Assessment & Plan     · Patient has recurrent atypical chest pain symptoms, some of which are pleuritic and chest pain and some of which are chest wall sounding.  · Given patient's cardiovascular risks, I recommend exercise echo for further risk stratification         Relevant Orders    Adult Stress Echo W/ Cont or Stress Agent if Necessary Per Protocol       Other    Candidate for statin therapy due to risk of future cardiovascular event    Overview     · Estimated 10 year risk of cardiovascular events > 40%.         Current Assessment & Plan     · Patient declines statin therapy for primary prevention                    · Exercise stress echo with full echo   · Patient defers statin therapy for primary prevention  · If stress test unremarkable, patient may follow-up when necessary  · Chest wall pain may be treated with as needed NSAID therapy     Hubert Woodruff IV, MD  10/30/2018    Scribed for Hubert Woodruff IV, MD by Deanne Contreras. 10/30/2018  1:37 PM     28.1

## 2021-06-22 ENCOUNTER — DOCUMENTATION (OUTPATIENT)
Dept: SPEECH THERAPY | Facility: HOSPITAL | Age: 76
End: 2021-06-22

## 2021-06-22 DIAGNOSIS — G31.84 MILD COGNITIVE IMPAIRMENT: Primary | ICD-10-CM

## 2021-06-22 NOTE — THERAPY DISCHARGE NOTE
"Speech Language Pathology Discharge Summary         Patient Name: Aj Marie  : 1945  MRN: [id number]    Today's Date: 2021      SLP OP Goals     Row Name 21 1200          Goal Type Needed    Goal Type Needed  Memory;Attention/Orientation;Other Adult Goals  -HG        Subjective Comments    Subjective Comments  Pt seen for 11 sessions and more sessions being requested and pt wishes to take a break until Neurology follow up.   -HG        Memory Goals    Patient will be able to remember information needed to return to work and function on work-related tasks  90%:;with intermittent cues  -HG     Status: Patient will be able to remember information needed to return to work and function on work-related tasks  Progressing as expected  -HG     Comments: Patient will be able to remember information needed to return to work and function on work-related tasks  21: Pt reports he is not writing things down because \"I don't need to... I don't forget much.\" Pt states he uses a piece of paper to write things down if he needs to remember multiple tasks. 21: Pt notes he does not normally write things down when at work. 3/25/21: Pt continues to work with strategies in place.   -HG     Patient will demonstrate improved ability to recall information by immediately recalling a series of words  80%:;related;after delay;with cues  -HG     Status: Patient will demonstrate improved ability to recall information by immediately recalling a series of words  Progressing as expected  -HG     Comments: Patient will demonstrate improved ability to recall information by immediately recalling a series of words  21: MOCA immediate recall of 5 words was 100% independently. 21: SLUMS paragraph score 4/8. 4/15/21: Immediate recall of names and faces: pt was 70% accurate with use of various strategies. 3/25/21: RBANS Immediate recall and pt was 87, improved from a 78. 3/18/21: Immediate recall of paragraphs " 36-50 words: pt was 60% accurate I'ly and with use of chunking, pt was 80% accurate. 3/11/21: Immediate recall of shapes and figures: pt was 70% accurate. 2/25/21: Immediate recall for shapes and figures and pt was 70% accurate I'ly. 1/21/21: RBANS re-assess score of 78 places pt in the Borderline range, down from previous re-assessment.  11/19/20: Immediate recall of picture scene: pt was 70% accurate.  11/12/20: Immediate recall of a picture scene and pt was 70% accurate with min cues. 11/5/20: RBANS Immediate recall score: Pt improved to an 81 from a 73. 10/15/20: Immediate recall of visuospatial Constructional task and pt was 80% accurate with min cues. 10/1/20: ROMIE completed this date and pt did exhibit at least one significant error. 9/17/20: Immediate recall of word placement for 4 words: pt was 100% accurate. Hmwk for 5 words.  9/3/20: 4 related words from homework: pt was 2/4 I'ly. Moved to un-related pictures for visual ease and pt was 2/4. 8/27/20: 4 related words and pt was 1/4 and improved to 3/4 and 4/4 with mneumonic device.   -HG     Patient’s memory skills will be enhanced as reported by patient by utilizing internal memory strategies to recall up to 3 pieces of information after a 5- minute delay  80%:;with cues  -HG     Status: Patient’s memory skills will be enhanced as reported by patient by utilizing internal memory strategies to recall up to 3 pieces of information after a 5- minute delay  Progressing as expected  -HG     Comments: Patient’s memory skills will be enhanced as reported by patient by utilizing internal memory strategies to recall up to 3 pieces of information after a 5- minute delay  5/6/21: MOCA delayed recall of 5 words was 0/5 independently. 4/22/21: SLUMS delayed memory 1/5. 4/15/21: Pt delayed memory of 5 faces after 5 minute delay was 60%. 3/25/21: RBANS Delayed recall was 60 which remains consistent with last re-assessment score. 3/18/21: Pt was able to recall 6  un-related words in a sentence, 4/6, improved to 6/6 with use of strategies.  3/11/21: Pt was 6/6 with linking sentence in place.  3/4/21: Reviewed 5 words used in a sentence from previous session and pt was 5/5 with some hesitation. Reviewed 3 more times and pt was 5/5. Provided 6 words for hmwk.  2/25/21: 5 un-related words from previous session and pt required use of a fill in sentence but I'ly, pt was 3/5.  2/4/21: 5 un-related words: pt was 3/5 consistently even with use of picture and sentence. Words given for homework.   Pt did use a written strategy to remember this date where he parked but couldn't recall for sure if he wrote it or not. 1/21/21: RBANS re-assess score of Delayed  recall is at 60, Extremely low range, down from last re-assessment by 19 points.  12/17/20: Delayed recall of 5 un-related words: pt was 3/5 with use of visual prompts.  12/10/20: Delayed recall of 4 un-related words from homework and pt had no studied them and SLP reviewed and with a delay, pt was 3/4 consistently. 12/3/20: Delayed recall for 3 un-related and pt was 2/3 x 2 with review and with use of a sentence, pt was 3/3. 11/19/20: Delayed recall of 3 un-related words: pt was 2/3 with cues x 4. 11/12/20: Delayed recall of 8 related pictures from homework: pt was 8/8 x 2. 11/5/20: RBANS Delayed recall: pt was improved to a 79 from a 60.  10/29/20: Delayed recall of 6 related pictures: pt was 4/6. With use of first letter initials, pt was 6/6 accurate. 7th word added: pt was 7/7 x 3.   10/22/20: Delayed recall of 5 related pictures and pt was 5/5, 4/5, 5/5 x 2.  Added a sixth picture and pt was 5/6 . 10/15/20: Delayed recall of 4 related picture relative to pt and pt was 3/4 x 3. 10/1/20: FROMAJE: Delayed recall also exhibited at least one significant error. 9/17/20: 4 related words from homework and pt was 1/4- changed to 4 words that pertain to pt and he was 3/4.  9/10/20: 4 un-related pictures from homework: pt was 3/4.  2/4-  "moved to 4 related pictures and pt was 0/4 I'ly. With cues, pt was 3/4.   -HG     Patient’s memory skills will be enhanced as reported by patient by using external memory aides  80%:;with cues  -HG     Status: Patient’s memory skills will be enhanced as reported by patient by using external memory aides  Progressing as expected  -HG     Comments: Patient’s memory skills will be enhanced as reported by patient by using external memory aides  4/22/21: Pt states he is not using his journal. He states that he uses his calendar to remember events. 4/15/21: Pt notes he is not using his journal but is using his notebook to write down things that he has trouble remembering. 3/25/21: Pt continues to not use his journal. 3/18/21: Pt returned with his journal with a few notes in place. 3/11/21: Pt not using his journal and had no recall of where it was.   2/25/21: Pt given a small notepad for delayed recall to carry in his pocket since he is not writing consistently in his larger journal. 2/4/21:Increased education and reasoning for use of an external memory aide. Provided visual prompt to return with memory book next session. 1/21/21: Education with pt regarding importance of journaling whether on phone (shown lavon and strated an entry for this date) or in a paper journal due to low Delayed recall scores. 12/17/20: Pt reports writing in journal daily but did not return with journal to session. 12/3/20: Pt reports writing in journal every other day. 11/19/20: Pt states \"a little bit.\" 11/12/20: Education with pt for use of journal and bringing it to next therapy session.  11/5/20: Education and expectation of using his journal daily and returning to therapy with it next week. 10/29/20: cont'd education for use of daily journal. Provided a visual prompt.   10/15/20: Pt reports writing a little- pt states that he doesn't write very well.  10/1/20: Significant education with pt and wife regarding use of journal and/or notes lavon on " "phone. 9/17/20: Pt writing in journal but not in great detail and not consistent- education ongoing.  9/10/20: Pt stated that he has written in his journal a little. Provided visual prompts to remind pt to write in his journal. 9/3/20: Pt states that he journaling \"a little\". 8/27/20: Education for use of journal and the need for his delayed recall.   -HG     Patient will demonstrate improved ability to recall information by stating activities patient has completed that day  70%:;with cues  -HG     Status: Patient will demonstrate improved ability to recall information by stating activities patient has completed that day  Revised;Progressing as expected  -HG     Comments: Patient will demonstrate improved ability to recall information by stating activities patient has completed that day  5/6/21: Pt states he woke up, went to work, came home, and worked on crossword puzzles yesterday. 4/22/21: Pt states he only took his wife to work today before coming to his session. 4/15/21: Pt noted he woke up, ate breakfast, took his wife to an appointment, and then came to his SLP session. 3/25/21: Pt stated, \"I remember it now but when I get home or wherever I'm going, I don't remember.\" 3/4/21: Pt stated that he may not remember what he did during the day to record it at night and SLP advised pt not to wait and record it at night, record it sa soon or as close to the time of the event. 2/25/21: Pt could not recall where he parked and brought it up multiple times and was bothered by it. SLP provided pt with a small steno pad and he wrote it down. 1/21/21: Pt required cueing for his confusion of a morning and that he missed his first appt and was re-scheduled.  11/12/20: Pt states that he does the same thing every day. 10/22/20: When asked about his weekend, he was able to provide general information, not specifics of places or things seen. 10/1/20: Thorough education with pt and wife regarding use of journal. Pt admitted having " "to admit to his deficits. 8/27/20: Education for recall of what he has done throughout the day. Pt states that he never really worried about these things, \"That's why I had 2 secretaries.\"   -HG        Attention/Orientation Goals    Patient will be able to execute all activities necessary to manage a home  Independently;With Cues  -HG     Status: Patient will be able to execute all activities necessary to manage a home  Progressing as expected  -HG     Comments: Patient will be able to execute all activities necessary to manage a home  4/15/21: Pt stated he is not having difficulties with remembering/executing daily tasks at home. 3/25/21: RBANS Attention score of 94 is improved from 91 on previous re-assessment. 1/21/21: RBANS re-assess Attention score of 91 is down from 103. 11/5/20: RBANS Attention Score: Improved to a 103 from an 88. 10/1/20: FROMAJE for attention and pt exhibited fast processing with minimal errors.   -HG     Patient will improve attention skills by sustaining consistent behavioral response during continuous and repetitive activity (e.g., listening for target words, auditory/reading comprehension tasks)  with cues;10 minute task  -HG     Status: Patient will improve attention skills by sustaining consistent behavioral response during continuous and repetitive activity (e.g., listening for target words, auditory/reading comprehension tasks)  Progressing as expected  -HG     Comments: Patient will improve attention skills by sustaining consistent behavioral response during continuous and repetitive activity (e.g., listening for target words, auditory/reading comprehension tasks)  4/22/21: Divided attention card game Up and Down for sorting diamonds descending and clubs ascending. Pt was 90% accurate with moderate cues. 3/4/21: Divided attention for answering questions from 22-36 word paragraphs while sorting cards by suit and pt was 80% accurate. 12/10/20: APT TEST: Pt scored above average for " all areas except Divided attention and that was below average. Pt very concerned it can't remember more than he does. 10/22/20: Recognition of cards presented quickly: pt was nearly 100% accurate. 9/10/20: Card ID for a card that is one less than one before it:   -HG     Patient will improve attention skills by sustaining focus in order to actively hold and manipulate information provided (e.g., sequencing auditorily presented number series in ascending or descending order)  90%:;with cues  -HG     Status: Patient will improve attention skills by sustaining focus in order to actively hold and manipulate information provided (e.g., sequencing auditorily presented number series in ascending or descending order)  Progressing as expected;Revised  -HG     Comments: Patient will improve attention skills by sustaining focus in order to actively hold and manipulate information provided (e.g., sequencing auditorily presented number series in ascending or descending order)  3/11/21: Digit Span and pt was up to 8 digits. With distraction cards, pt was 90% accurate. 12/3/20: Mental Manipulation for word progression: pt was 90% accurate.  10/22/20: Digit Span: pt continues to be successful with 6 digits. 7 digits: 6/7. 9/3/20: Digit Span: pt was able to recall up to 6 digits.   -HG     Patient will improve attention skills by alternating or shifting focus between two different tasks in order to complete both tasks  80%:;with cues  -HG     Status: Patient will improve attention skills by alternating or shifting focus between two different tasks in order to complete both tasks  Progressing as expected  -HG     Comments: Patient will improve attention skills by alternating or shifting focus between two different tasks in order to complete both tasks  3/18/21: Card sorting by N and suit and pt was 60% accurate this date and felt that it was harder and when it was re-targeted with less impulsivity, pt was 80% accurate. 3/4/21: Card  sorting by N and suit and pt was 90% accurate. 12/10/20: Card Sorting by N and pt was less impulsive and used his own strategy for sorting and pt was 80% accurate. 11/12/20: Card sorting by N and suit: pt was 80-90% accurate with 2 run throughs.  10/22/20: Alternating attention for cards with N and into their suit and pt was 80% accurate. 9/3/20: Alternating attention for cards with N and sorting into suit and pt was 50% accurate.   -HG     Patient will improve attention skills by dividing focus and responding simultaneously to multiple tasks or in order to complete task  90%:;with cues  -HG     Status: Patient will improve attention skills by dividing focus and responding simultaneously to multiple tasks or in order to complete task  Progressing as expected;Revised  -HG     Comments: Patient will improve attention skills by dividing focus and responding simultaneously to multiple tasks or in order to complete task  3/4/21: Card game [place] and pt was 85% accurate. 12/3/20: Alternating attention for visual scanning and pt's impulsivity plays ar role but pt improved to 90% accurate.  10/29/20: Card sorting based on order per suit and high to low. 9/10/20: Divided attention for card sorting according to suit and specific date: pt was 90% accurate.   -HG     Patient will improve attention skills by sustaining focus to high-level cognitive tasks in order to complete task  80%:;with cues  -HG     Status: Patient will improve attention skills by sustaining focus to high-level cognitive tasks in order to complete task  Progressing as expected  -HG     Comments: Patient will improve attention skills by sustaining focus to high-level cognitive tasks in order to complete task  3/11/21: Visuospatial for shapes and figures and pt was 70% accurate. 2/4/21: Clock drawing and setting clock to accurate time: pt struggled initially with making the hour and minute hand the same length and with further instruction and  cues, pt was 12/3/20: One Pile Card Game: pt was 80% accurate with mod cues. Pt continues to be impulsive when playing Independently and was 70% accurate.  11/19/20: One Pile Card Game: pt was impulsive and required mod cues and was 80% accurate.  10/15/20: One Pile Card Game: reviewed before play: pt was 80%accurate. 8/27/20: One Pile Card Game: Pt was 75% accurate.   -HG        Other Goals    Other Adult Goal- 1  Pt will improve RBANS Total to at least a 85 placing pt in the Low  Average range.   -HG     Status: Other Adult Goal- 1  Progressing as expected;Revised  -     Comments: Other Adult Goal- 1  5/6/21: MOCA total score of 21 places pt in mild cognitive impairment range. 4/22/21: SLUMS score of 20 places pt in Dementia range. 3/25/21: RBANS Total Score of 78 falls in the [place] Range and pt was at 74. 1/21/21: RBANS Total score of 74 is down from 82 at time of last re-assessment.  11/5/30: RBANS Total of 82 improved from a 77. 10/1/20: FROMAJE: Pt score a 13- Stage 4.3. 9/10/20: RBANS not re-administered this date, pt given SLUMS and scored a 19/30.   -HG     Other Adult Goal- 2  Pt will complete high level reasoning tasks and problem solving with at least 80% accuracy.  -HG     Status: Other Adult Goal- 2  New;Progressing as expected  -HG     Comments: Other Adult Goal- 2  12/17/20: Language and reasoning for game and pt was 90% accurate.  10/22/20: Deduction puzzle: pt was 70% accurate.    -        SLP Time Calculation    SLP Goal Re-Cert Due Date  06/05/21  -       User Key  (r) = Recorded By, (t) = Taken By, (c) = Cosigned By    Initials Name Provider Type    Candy Bello MS CCC-SLP Speech and Language Pathologist                 Time Calculation:                    Candy Rubin MS CCC-SLP  6/22/2021

## 2021-08-23 ENCOUNTER — TELEPHONE (OUTPATIENT)
Dept: NEUROLOGY | Facility: CLINIC | Age: 76
End: 2021-08-23

## 2021-08-23 DIAGNOSIS — G31.84 MILD COGNITIVE IMPAIRMENT: Primary | ICD-10-CM

## 2021-08-23 NOTE — TELEPHONE ENCOUNTER
I SPOKE WITH PT'S WIFE ALPHONSO TO RESCHEDULE PT AND SHE SAID HE WAS DOING PRETTY GOOD AND WANTED TO KNOW IF ANOTHER SPEECH THERAPY ORDER COULD BE PLACED THE LAST ONE SEEMED TO REALLY HELP AND THEY WANT WITH SAME THERAPIST.  ALSO WOULD LIKE TO KNOW IF PT COULD HAVE HIS DONEPEZIL INCREASED, IT SEEMS TO HELP AND NO SIDE EFFECTS.  PT'S WIFE SAID HE HAS SOME OUTBURST, EMOTIONAL AND A LITTLE AGITATION AND WANTED TO KNOW IF THERE WERE SOMETHING TO GIVE HIM TO HELP WITH THESE EPISODES?

## 2021-08-23 NOTE — TELEPHONE ENCOUNTER
I have put the referral in for cognitive rehabilitation. We can increase his donepezil to 10mg BID if they would like, but I do not believe they have tried Namenda yet. We could try a daily antidepressant to help with his mood. However, I would recommend making these changes one at a time. Thanks

## 2021-08-24 NOTE — TELEPHONE ENCOUNTER
Caller: Shwetha Marie    Relationship: Emergency Contact; SPOUSE    Best call back number: [phone]    What was the call regarding: PT'S WIFE CALLED STATING THAT THEY JUST MISSED A PHONE CALL FROM THE OFFICE AND ARE RETURNING THAT PHONE. PER ROMAN, PT'S WIFE TO RECEIVE CALL BACK TOMORROW MORNING.    Do you require a callback: YES, PLEASE.    PLEASE REVIEW AND ADVISE.

## 2021-08-25 RX ORDER — DONEPEZIL HYDROCHLORIDE 10 MG/1
10 TABLET, FILM COATED ORAL 2 TIMES DAILY
Qty: 60 TABLET | Refills: 11 | Status: SHIPPED | OUTPATIENT
Start: 2021-08-25 | End: 2021-10-21

## 2021-08-25 NOTE — TELEPHONE ENCOUNTER
I was finally able to speak with pt's wife and she said she would like to increase the donepezil to 10 mg twice a day and script sent to [facility] pharmacy  in chart please.  She also wanted to thank our staff for being so thoughtful, respectful and timely with her concerns.

## 2021-10-14 ENCOUNTER — HOSPITAL ENCOUNTER (OUTPATIENT)
Dept: SPEECH THERAPY | Facility: HOSPITAL | Age: 76
Setting detail: THERAPIES SERIES
Discharge: HOME | End: 2021-10-14

## 2021-10-14 DIAGNOSIS — G31.84 MILD COGNITIVE IMPAIRMENT: Primary | ICD-10-CM

## 2021-10-14 PROCEDURE — 92523 SPEECH SOUND LANG COMPREHEN: CPT

## 2021-10-14 NOTE — THERAPY EVALUATION
Outpatient Speech Language Pathology   Adult Speech Language Cognitive Initial Evaluation  [facility]     Patient Name: Aj Marie  : 1945  MRN: [id number]  Today's Date: 10/14/2021        Visit Date: 10/14/2021   Patient Active Problem List   Diagnosis   • First degree atrioventricular block   • OA (osteoarthritis)   • Hearing loss   • Essential hypertension   • Candidate for statin therapy due to risk of future cardiovascular event   • Atypical chest pain   • Mild cognitive impairment        Past Medical History:   Diagnosis Date   • Chest pain     Chest pain/dyspnea: Considering anginal equivalent and his risk factors, we will evaluate with a stress echocardiogram at his convenience and send a letter with the results and further recommendations.   • Dyslipidemia     2015:  Total cholesterol 186, triglycerides 147, HDL 37, .ASCVD 41.9%.   • First degree atrioventricular block     Chronicity unknown at this time, possibly first told 20 years ago.    • Hearing loss    • Hyperlipidemia    • Hypertension    • OA (osteoarthritis)         Past Surgical History:   Procedure Laterality Date   • HERNIA REPAIR     • OTHER SURGICAL HISTORY      Remote skull surgery.   • POLYPECTOMY           Visit Dx:    ICD-10-CM ICD-9-CM   1. Mild cognitive impairment  G31.84 331.83            OP SLP Assessment/Plan - 10/14/21 0800        SLP Assessment    Functional Problems Speech Language- Adult/Cognition  -HG    Impact on Function: Adult Speech Language/Cognition Difficulty sequencing thoughts to express complex messages; Difficulty following directions; Unrealistic view of abilities/deficits; Lack of insight or awareness of deficits, safety issues; Unable to complete specified job requirements; Trouble learning or remembering new information; Poor attention to task  -HG    Clinical Impression: Speech Language-Adult/Congnition Moderate:; Cognitive Communication Impairment  -HG    Functional Problems  "Comment Pt states that he is \"no better or no worse\" than he was ~4 mos ago when he was D/C'd from SLP services.  -HG    Clinical Impression Comments RBANS Total score of 73 places pt in the Borderline range.  -HG    Please refer to paper survey for additional self-reported information Yes  -HG    Please refer to items scanned into chart for additional diagnostic informaiton and handouts as provided by clinician Yes  -HG    SLP Diagnosis Moderate Cog Comm Impairment  -HG    Prognosis Excellent (comment)  -HG    Patient/caregiver participated in establishment of treatment plan and goals Yes  -HG    Patient would benefit from skilled therapy intervention Yes  -HG       SLP Plan    Frequency 1x/week  -HG    Duration 12 weeks  -HG    Planned CPT's? SLP SPEECH & LANGUAGE EVAL: 04732; SLP INDIVIDUAL SPEECH THERAPY: 42675  -HG    Expected Duration of Therapy Session (SLP Eval) 60  -HG    Plan Comments Initiate cog tx.  -HG          User Key  (r) = Recorded By, (t) = Taken By, (c) = Cosigned By    Initials Name Provider Type     Candy Rubin, MS CCC-SLP Speech and Language Pathologist                 SLP SLC Evaluation - 10/14/21 0800        Communication Assessment/Intervention    Document Type evaluation  -HG    Subjective Information no complaints  -HG    Patient Observations alert; cooperative; agree to therapy  -HG    Patient/Family/Caregiver Comments/Observations No family present  -HG    Care Plan Review evaluation/treatment results reviewed  -HG    Patient Effort excellent  -HG    Symptoms Noted During/After Treatment none  -HG       General Information    Patient Profile Reviewed yes  -HG    Pertinent History Of Current Problem Pt is a 76 year old male being followed for Mild Cognitive Impairment. Pt received skilled SLP services ~4 months ago and wife and pt requested return due to emotional outbursts and increased agitation.  -HG    Precautions/Limitations, Vision WFL; for purposes of eval  -HG    " Precautions/Limitations, Hearing hearing impairment, bilaterally; other (see comments)   Pt was not wearing HA for evaluation, forgot at home. -HG    Patient Level of Education College hours  -HG    Prior Level of Function-Communication cognitive-linguistic impairment  -HG    Plans/Goals Discussed with patient  -HG    Barriers to Rehab none identified  -HG    Patient's Goals for Discharge patient did not state  -HG       Auditory Comprehension Assessment/Intervention    Auditory Comprehension (Communication) WFL  -HG       Reading Comprehension Assessment/Intervention    Reading Comprehension (Communication) WFL  -HG    Reading Comprehension, Comment Further assessment to be completed.  -HG       Verbal Expression Assessment/Intervention    Verbal Expression WFL  -HG       Graphic Expression Assessment/Intervention    Graphic Expression WFL  -HG    Graphic Expression, Comment To be further assessed.  -HG       Oral Motor Structure and Function    Oral Motor Structure and Function WFL  -HG    Dentition Assessment edentulous, does not have dentures  -HG    Mucosal Quality moist, healthy  -HG       Oral Musculature and Cranial Nerve Assessment    Oral Motor General Assessment WFL  -HG       Motor Speech Assessment/Intervention    Motor Speech Function WFL  -HG    Motor Speech, Comment Articulation errors present due to edentulous  -HG       Cursory Voice Assessment/Intervention    Quality and Resonance (Voice) WFL  -HG       RBANS- Repeatable Battery for the Assessment of Neuropsychological Status    Immediate Memory Index Score 73  -HG    Immediate Memory Percentile 4 %  -HG    Immediate Memory Qualitative Description borderline  -HG    Visuospatial Index Score 84  -HG    Visuospatial Percentile 14 %  -HG    Visuospatial Qualitative Description low average  -HG    Language Index Score 92  -HG    Language Percentile 30 %  -HG    Language Qualitative Description average  -HG    Attention Index Score 91  -HG    Attention  Percentile 27 %  -HG    Attention Qualitative Description average  -HG    Delayed Memory Index Score 52  -HG    Delayed Memory Percentile 0 %  -HG    Delayed Memory Qualitative Description extremely low  -HG    Total Index Score 392  -HG    Total Percentile 4 %  -HG    Total Qualitative Description borderline  -HG       Recommendations    Therapy Frequency (SLP SLC) 1 day per week  -HG    Predicted Duration Therapy Intervention (Days) until discharge  -HG          User Key  (r) = Recorded By, (t) = Taken By, (c) = Cosigned By    Initials Name Provider Type    Candy Bello MS CCC-SLP Speech and Language Pathologist                                OP SLP Education     Row Name 10/14/21 0800       Education    Barriers to Learning No barriers identified  -HG    Education Provided Patient requires further education on strategies, risks; Family/caregivers require further education on strategies, risks  -HG    Assessed Learning needs; Learning motivation; Learning preferences; Learning readiness  -    Learning Motivation Strong  -    Learning Method Explanation; Demonstration; Teach back; Written materials  -    Teaching Response Verbalized understanding; Demonstrated understanding; Reinforcement needed  -HG    Education Comments Education for importance of compensatory strategies in order to aide in delayed recall deficits.  -HG          User Key  (r) = Recorded By, (t) = Taken By, (c) = Cosigned By    Initials Name Effective Dates    Candy Bello MS CCC-SLP 06/16/21 -                SLP OP Goals     Row Name 10/14/21 0800          Goal Type Needed    Goal Type Needed Memory; Attention/Orientation; Other Adult Goals  -            Subjective Comments    Subjective Comments Pt alert, cooperative, impulsive in nature.  -HG            Memory Goals    Patient and family will implement compensatory strategies to maximize patient’s Memory function so patient can continue to participate in daily  activities 90%:; with intermittent cues  -HG     Status: Patient and family will implement compensatory strategies to maximize patient’s Memory function so patient can continue to participate in daily activities New  -HG     Patient will demonstrate improved ability to recall information by immediately recalling a series of words 80%:; related; after delay; with cues  -HG     Status: Patient will demonstrate improved ability to recall information by immediately recalling a series of words New  -HG     Patient’s memory skills will be enhanced as reported by patient by utilizing internal memory strategies to recall up to 3 pieces of information after a 5- minute delay 80%:; with cues  -HG     Status: Patient’s memory skills will be enhanced as reported by patient by utilizing internal memory strategies to recall up to 3 pieces of information after a 5- minute delay New  -HG     Patient’s memory skills will be enhanced as reported by patient by using external memory aides 80%:; with cues  -HG     Status: Patient’s memory skills will be enhanced as reported by patient by using external memory aides New  -HG     Patient will demonstrate improved ability to recall information by stating activities patient has completed that day 80%:; with cues  -HG     Status: Patient will demonstrate improved ability to recall information by stating activities patient has completed that day New  -HG            Attention/Orientation Goals    Patient will be able to safely participate and attend to stimuli in structured settings Independently  -HG     Status: Patient will be able to safely participate and attend to stimuli in structured settings New  -HG     Patient will improve attention skills by sustaining consistent behavioral response during continuous and repetitive activity (e.g., listening for target words, auditory/reading comprehension tasks) with cues; 10 minute task  -HG     Status: Patient will improve attention skills by  sustaining consistent behavioral response during continuous and repetitive activity (e.g., listening for target words, auditory/reading comprehension tasks) New  -HG     Patient will improve attention skills by sustaining focus in order to actively hold and manipulate information provided (e.g., sequencing auditorily presented number series in ascending or descending order) 80%:; with cues  -HG     Status: Patient will improve attention skills by sustaining focus in order to actively hold and manipulate information provided (e.g., sequencing auditorily presented number series in ascending or descending order) New  -HG     Patient will improve attention skills by focusing to selective target/task when presented with competing stimuli or in a distracting environment in order to complete task 80%:; with cues  -HG     Status: Patient will improve attention skills by focusing to selective target/task when presented with competing stimuli or in a distracting environment in order to complete task New  -HG     Patient will improve attention skills by alternating or shifting focus between two different tasks in order to complete both tasks 80%:; with cues  -HG     Status: Patient will improve attention skills by alternating or shifting focus between two different tasks in order to complete both tasks New  -HG     Patient will improve attention skills by sustaining focus to high-level cognitive tasks in order to complete task 80%:; with cues  -HG     Status: Patient will improve attention skills by sustaining focus to high-level cognitive tasks in order to complete task New  -HG            Other Goals    Other Adult Goal- 1 Pt will improve RBANS Score to at lesat an 80 placing pt in the Low Average range.  -HG     Status: Other Adult Goal- 1 New  -HG     Other Adult Goal- 2 Pt will participate in further reasoning and problem solving assessment with recs to follow as indicated.  -HG     Status: Other Adult Goal- 2 New  -HG             SLP Time Calculation    SLP Goal Re-Cert Due Date 11/13/21  -           User Key  (r) = Recorded By, (t) = Taken By, (c) = Cosigned By    Initials Name Provider Type     Candy Rubin MS CCC-SLP Speech and Language Pathologist                     Time Calculation:   SLP Start Time: 0800  Untimed Charges  84649-ZZ Eval Speech and Production w/ Language Minutes: 90  Total Minutes  Untimed Charges Total Minutes: 90   Total Minutes: 90    Therapy Charges for Today     Code Description Service Date Service Provider Modifiers Qty    [id number]  ST EVAL SPEECH AND PROD W LANG  6 10/14/2021 Candy Rubin MS CCC-SLP GN 1                   Candy Rubin MS CCC-ROZINA  10/14/2021

## 2021-10-21 ENCOUNTER — OFFICE VISIT (OUTPATIENT)
Dept: NEUROLOGY | Facility: CLINIC | Age: 76
End: 2021-10-21

## 2021-10-21 VITALS
SYSTOLIC BLOOD PRESSURE: 150 MMHG | BODY MASS INDEX: 29.73 KG/M2 | HEIGHT: 66 IN | TEMPERATURE: 97.7 F | HEART RATE: 59 BPM | OXYGEN SATURATION: 98 % | DIASTOLIC BLOOD PRESSURE: 88 MMHG | WEIGHT: 185 LBS

## 2021-10-21 DIAGNOSIS — G30.9 MAJOR NEUROCOGNITIVE DISORDER DUE TO ALZHEIMER'S DISEASE, WITH BEHAVIORAL DISTURBANCE (HCC): Primary | ICD-10-CM

## 2021-10-21 DIAGNOSIS — F02.818 MAJOR NEUROCOGNITIVE DISORDER DUE TO ALZHEIMER'S DISEASE, WITH BEHAVIORAL DISTURBANCE (HCC): Primary | ICD-10-CM

## 2021-10-21 PROBLEM — K76.9 LIVER LESION: Status: ACTIVE | Noted: 2019-02-07

## 2021-10-21 PROBLEM — K63.5 COLON POLYPS: Status: ACTIVE | Noted: 2019-03-07

## 2021-10-21 PROBLEM — R33.9 URINARY RETENTION: Status: ACTIVE | Noted: 2018-07-10

## 2021-10-21 PROBLEM — K76.0 NAFLD (NONALCOHOLIC FATTY LIVER DISEASE): Status: ACTIVE | Noted: 2019-02-07

## 2021-10-21 PROBLEM — D49.0 IPMN (INTRADUCTAL PAPILLARY MUCINOUS NEOPLASM): Status: ACTIVE | Noted: 2019-02-21

## 2021-10-21 PROCEDURE — 99214 OFFICE O/P EST MOD 30 MIN: CPT | Performed by: NURSE PRACTITIONER

## 2021-10-21 RX ORDER — DONEPEZIL HYDROCHLORIDE 10 MG/1
10 TABLET, FILM COATED ORAL 2 TIMES DAILY
Qty: 180 TABLET | Refills: 3 | Status: SHIPPED | OUTPATIENT
Start: 2021-10-21 | End: 2022-03-23

## 2021-10-21 RX ORDER — CHOLECALCIFEROL (VITAMIN D3) 125 MCG
500 CAPSULE ORAL 2 TIMES DAILY
COMMUNITY

## 2021-10-21 RX ORDER — MEMANTINE HYDROCHLORIDE 10 MG/1
TABLET ORAL
Qty: 180 TABLET | Refills: 3 | Status: SHIPPED | OUTPATIENT
Start: 2021-10-21 | End: 2022-04-08

## 2021-10-21 RX ORDER — MELATONIN
1000 DAILY
COMMUNITY

## 2021-10-21 NOTE — PATIENT INSTRUCTIONS
MEMANTINE 10MG (NAMENDA)    Take 1/2 tablet daily for 2 wks,   then 1/2 tablet twice day for 2 wks,   then 1 tablet in the morning & 1/2 tablet at night for 2 weeks then take 1 tablet twice a day and continue.    Continue taking the donepezil.

## 2021-10-21 NOTE — PROGRESS NOTES
"Subjective:     Patient ID: Aj Marie is a 76 y.o. male.    CC:   Chief Complaint   Patient presents with   • Alzheimer's Disease       HPI:   History of Present Illness   Aj Marie is a 76 y.o. male who returns to clinic today for evaluation of Alzheimer's Disease . He has noted symptoms since at least 2019 marked initially by forgetfulness  and word-finding difficulties . This has gradually worsened  over time. Additional symptoms have included impairments in short term memory . There have been associated  symptoms of anxiety  and agitation . He denies  impairments in ADL's. His family  manages his medications  and finances. 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 He is currently residing at home with his wife.     He and his wife are here today for 8-month follow-up on memory difficulties as well as the results of neurocognitive evaluation completed at [facility] on 6/30/2021.  He is currently taking the donepezil 10 mg daily which we restarted last visit. His wife feels this has improved his symptoms. He had stopped taking this consistently.  He does have 3 years of college education.  He worked as the head of manufacturing company.  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  He does occasionally have confusion, agitation and anxiety.  He does admit this is typically towards his wife because he \"feels most comfortable around her\".  His wife has an excellent relationship with her [employer]  and they have not expressed any concerns his driving.  Of note the neurocognitive testing completed at the [facility] in summary showed findings consistent with a \"major neurocognitive disorder, mild severity due to early Alzheimer's disease with concomitant white matter changes of the brain along with mild behavioral disturbance\"-per Brittany Flores, PhD.   Recommendations were to manage " vascular risk factors as well as discussed additional cognitive enhancers. He is also very hard of hearing and is [facility]. They also report his PCP has given them a home blood pressure monitoring device and blood pressure typically is 150-160 Systolic and 80-90 diastolic-PCP to discuss additional treatments when they follow up per his wife.    Prior evaluation has included a normal MRI of the brain  showing minimal generalized atrophy and 2 punctate chronic small vessel ischemic changes noted and reviewed imaging previously with patient and wife on 2021.  And screening blood work with PCP normal B12, folate and RPR was negative.    The following portions of the patient's history were reviewed and updated as appropriate: allergies, current medications, past family history, past medical history, past social history, past surgical history and problem list.    Past Medical History:   Diagnosis Date   • Chest pain     Chest pain/dyspnea: Considering anginal equivalent and his risk factors, we will evaluate with a stress echocardiogram at his convenience and send a letter with the results and further recommendations.   • Dyslipidemia     2015:  Total cholesterol 186, triglycerides 147, HDL 37, .ASCVD 41.9%.   • First degree atrioventricular block     Chronicity unknown at this time, possibly first told 20 years ago.    • Hearing loss    • Hyperlipidemia    • Hypertension    • OA (osteoarthritis)        Past Surgical History:   Procedure Laterality Date   • HERNIA REPAIR     • OTHER SURGICAL HISTORY      Remote skull surgery.   • POLYPECTOMY         Social History     Socioeconomic History   • Marital status:    Tobacco Use   • Smoking status: Former Smoker     Quit date:      Years since quittin.8   • Smokeless tobacco: Never Used   • Tobacco comment: 3/11/2016:He quit smoking 20 years ago and was smoking 1 to 2 packs per day for  "15-20 years before that.    Vaping Use   • Vaping Use: Never used   Substance and Sexual Activity   • Alcohol use: No   • Drug use: No   • Sexual activity: Defer       Family History   Problem Relation Age of Onset   • Heart disease Mother    • Emphysema Mother    • Dementia Father    • No Known Problems Sister    • No Known Problems Sister         Review of Systems   Constitutional: Negative for chills, fatigue, fever and unexpected weight change.   HENT: Negative for ear pain, hearing loss, nosebleeds, rhinorrhea and sore throat.    Eyes: Negative for photophobia, pain, discharge, itching and visual disturbance.   Respiratory: Negative for cough, chest tightness, shortness of breath and wheezing.    Cardiovascular: Negative for chest pain, palpitations and leg swelling.   Gastrointestinal: Negative for abdominal pain, blood in stool, constipation, diarrhea, nausea and vomiting.   Genitourinary: Negative for dysuria, frequency, hematuria and urgency.   Musculoskeletal: Negative for arthralgias, back pain, gait problem, joint swelling, myalgias, neck pain and neck stiffness.   Skin: Negative for rash and wound.   Allergic/Immunologic: Negative for environmental allergies and food allergies.   Neurological: Negative for dizziness, tremors, seizures, syncope, speech difficulty, weakness, light-headedness, numbness and headaches.   Hematological: Negative for adenopathy. Does not bruise/bleed easily.   Psychiatric/Behavioral: Positive for agitation, decreased concentration and sleep disturbance. Negative for confusion, hallucinations and suicidal ideas. The patient is not nervous/anxious.    All other systems reviewed and are negative.       Objective:  /88   Pulse 59   Temp 97.7 °F (36.5 °C)   Ht 167.6 cm (66\")   Wt 83.9 kg (185 lb)   SpO2 98%   BMI 29.86 kg/m²     Neurologic Exam     Mental Status   Oriented to person, place, and time.   Registration: recalls 3 of 3 objects. Recall at 5 minutes: recalls 2 " of 3 objects.   Speech: speech is normal   Level of consciousness: alert    Cranial Nerves     CN II   Visual fields full to confrontation.     CN III, IV, VI   Pupils are equal, round, and reactive to light.  Extraocular motions are normal.     CN V   Facial sensation intact.     CN VII   Facial expression full, symmetric.     CN VIII   Hearing: impaired    CN IX, X   CN IX normal.   CN X normal.     CN XI   CN XI normal.     CN XII   CN XII normal.     Motor Exam   Muscle bulk: normal  Overall muscle tone: normal    Strength   Strength 5/5 throughout.     Gait, Coordination, and Reflexes     Gait  Gait: normal    Coordination   Finger to nose coordination: normal    Tremor   Resting tremor: absent  Intention tremor: absent  Action tremor: absent    Reflexes   Right brachioradialis: 2+  Left brachioradialis: 2+  Right biceps: 2+  Left biceps: 2+  Right patellar: 2+  Left patellar: 2+  Right achilles: 2+  Left achilles: 2+  Right : 2+  Left : 2+      Physical Exam  Constitutional:       Appearance: Normal appearance.   Eyes:      Extraocular Movements: EOM normal.      Pupils: Pupils are equal, round, and reactive to light.   Neurological:      Mental Status: He is alert and oriented to person, place, and time.      Coordination: Finger-Nose-Finger Test normal.      Gait: Gait is intact.      Deep Tendon Reflexes: Strength normal.      Reflex Scores:       Bicep reflexes are 2+ on the right side and 2+ on the left side.       Brachioradialis reflexes are 2+ on the right side and 2+ on the left side.       Patellar reflexes are 2+ on the right side and 2+ on the left side.       Achilles reflexes are 2+ on the right side and 2+ on the left side.  Psychiatric:         Mood and Affect: Mood and affect normal.         Speech: Speech normal.         Behavior: Behavior is slowed.         Thought Content: Thought content normal.         Cognition and Memory: He exhibits impaired recent memory (mild).          Judgment: Judgment normal.       MMSE 10/21/21 28/30-improved  MMSE 7/2020 23/30   MMSE 2/24/21 25/30    Assessment/Plan:       Diagnoses and all orders for this visit:    1. Major neurocognitive disorder due to Alzheimer's disease, with behavioral disturbance (HCC) (Primary)  -     memantine (NAMENDA) 10 MG tablet; Take 1/2 tab daily x 2 wks, then 1/2 tab bid x 2 wks, then 1 tab am, 1/2 tab pm x 2 wks then 1 tab bid and continue  Dispense: 180 tablet; Refill: 3  -     donepezil (Aricept) 10 MG tablet; Take 1 tablet by mouth 2 (Two) Times a Day.  Dispense: 180 tablet; Refill: 3         Total time of visit today 30 minutes which includes review of neurocognitive evaluation at , discussion with the patient and his wife, education about Alzheimer's disease, along with handouts for the different stages and progression of the disease, physical and neurological exam as well as completion of documentation of this visit.  We reviewed  neurocognitive evaluation with summary and recommendations.  He continues to meet with our neurology speech-language pathologist for cognitive therapy and just recently restarted and this has been very helpful.  For now his wife along with his employer have no concerns about his driving.  We have discussed him only driving locally.  I have also discussed if there is any concern for driving at any point we will have him complete formal driving evaluation at [facility].  He and his wife verbalized understanding.  He will continue the donepezil and we will add memantine for cognitive enhancement.  We are going to follow-up in clinic in 4 months for reevaluation.  Reviewed medications, potential side effects and signs and symptoms to report. Discussed risk versus benefits of treatment plan with patient and/or family-including medications, labs and radiology that may be ordered. Addressed questions and concerns during visit. Patient and/or family verbalized understanding and agree with  plan.    AS THE PROVIDER, I PERSONALLY WORE PPE DURING ENTIRE FACE TO FACE ENCOUNTER IN CLINIC WITH THE PATIENT. PATIENT ALSO WORE PPE DURING ENTIRE FACE TO FACE ENCOUNTER EXCEPT FOR A MAX OF 30 SECONDS DURING NEUROLOGICAL EVALUATION OF CRANIAL NERVES AND THEN MASK WAS PLACED BACK OVER PATIENT FACE FOR REMAINDER OF VISIT. I WASHED MY HANDS BEFORE AND AFTER VISIT.    During this visit the following were done:  Labs Reviewed []    Labs Ordered []    Radiology Reports Reviewed []    Radiology Ordered []    PCP Records Reviewed []    Referring Provider Records Reviewed []    ER Records Reviewed []    Hospital Records Reviewed []    History Obtained From Family [x]    Radiology Images Reviewed []    Other Reviewed [x]    Records Requested []      Gisell Marlow, TAMERA  10/22/2021

## 2021-10-22 ENCOUNTER — TELEPHONE (OUTPATIENT)
Dept: NEUROLOGY | Facility: CLINIC | Age: 76
End: 2021-10-22

## 2021-10-22 NOTE — TELEPHONE ENCOUNTER
GONZALO FOR [employer]UB TO RELAY MESSAGE:  I RECEIVED A PRIOR AUTHORIZATION FOR THE DONEPEZIL AND WILL COMPLETE

## 2021-10-22 NOTE — TELEPHONE ENCOUNTER
Provider: CHAY MARCH  Caller: ALPHONSO  Relationship to Patient: WIFE  Pharmacy: [facility] [id number]  Phone Number: [phone]  Reason for Call: PTS WIFE SAYS INS ISNT COVERING THE DONEPEZIL INCREASED TO 2 TIMES PER DAY.    SHE SAID SHE THINKS SHE HAS PLENTY TO SUPPLEMENT THE CHANGE IF NEED BE.       PLEASE REVIEW AND CALL PTS WIFE ALPHONSO TO DISCUSS.- [phone]

## 2021-10-25 ENCOUNTER — PRIOR AUTHORIZATION (OUTPATIENT)
Dept: NEUROLOGY | Facility: CLINIC | Age: 76
End: 2021-10-25

## 2021-10-28 ENCOUNTER — HOSPITAL ENCOUNTER (OUTPATIENT)
Dept: SPEECH THERAPY | Facility: HOSPITAL | Age: 76
Setting detail: THERAPIES SERIES
Discharge: HOME | End: 2021-10-28

## 2021-10-28 DIAGNOSIS — G31.84 MILD COGNITIVE IMPAIRMENT: Primary | ICD-10-CM

## 2021-10-28 PROCEDURE — 92507 TX SP LANG VOICE COMM INDIV: CPT

## 2021-10-28 NOTE — THERAPY TREATMENT NOTE
"Outpatient Speech Language Pathology   Adult Speech Language Cognitive Treatment Note  [facility]     Patient Name: Aj Marie  : 1945  MRN: [id number]  Today's Date: 10/28/2021         Visit Date: 10/28/2021   Patient Active Problem List   Diagnosis   • First degree atrioventricular block   • OA (osteoarthritis)   • Hearing loss   • Essential hypertension   • Candidate for statin therapy due to risk of future cardiovascular event   • Atypical chest pain   • Mild cognitive impairment   • Colon polyps   • IPMN (intraductal papillary mucinous neoplasm)   • Liver lesion   • NAFLD (nonalcoholic fatty liver disease)   • Urinary retention          Visit Dx:    ICD-10-CM ICD-9-CM   1. Mild cognitive impairment  G31.84 331.83        OP SLP Assessment/Plan - 10/28/21 0815        SLP Plan    Plan Comments Cont with Cog tx.  -HG          User Key  (r) = Recorded By, (t) = Taken By, (c) = Cosigned By    Initials Name Provider Type    Candy Bello MS CCC-SLP Speech and Language Pathologist                                   SLP OP Goals     Row Name 10/28/21 0815          Goal Type Needed    Goal Type Needed Memory; Attention/Orientation; Other Adult Goals  -HG            Subjective Comments    Subjective Comments Pt alert, cooperative, late for session and stated, \"I don't really know why I'm late.\"  -HG            Memory Goals    Patient and family will implement compensatory strategies to maximize patient’s Memory function so patient can continue to participate in daily activities 90%:; with intermittent cues  -HG     Status: Patient and family will implement compensatory strategies to maximize patient’s Memory function so patient can continue to participate in daily activities New  -HG     Patient will demonstrate improved ability to recall information by immediately recalling a series of words 80%:; related; after delay; with cues  -HG     Status: Patient will demonstrate improved ability to recall " information by immediately recalling a series of words New  -HG     Patient’s memory skills will be enhanced as reported by patient by utilizing internal memory strategies to recall up to 3 pieces of information after a 5- minute delay 80%:; with cues  -HG     Status: Patient’s memory skills will be enhanced as reported by patient by utilizing internal memory strategies to recall up to 3 pieces of information after a 5- minute delay Progressing as expected  -HG     Comments: Patient’s memory skills will be enhanced as reported by patient by utilizing internal memory strategies to recall up to 3 pieces of information after a 5- minute delay 10/28/21: Delayed recall of 4 related animals and pt was 2/4, 3/4 x 2. Using a first letter initial and pt was 4/4.  -HG     Patient’s memory skills will be enhanced as reported by patient by using external memory aides 80%:; with cues  -HG     Status: Patient’s memory skills will be enhanced as reported by patient by using external memory aides Progressing as expected  -HG     Comments: Patient’s memory skills will be enhanced as reported by patient by using external memory aides 10/28/21: Provided a journal this date with example entry for current date and provided 5 cue cards to place in specific areas around his home and car to prompt pt to write in the journal.  -HG     Patient will demonstrate improved ability to recall information by stating activities patient has completed that day 80%:; with cues  -HG     Status: Patient will demonstrate improved ability to recall information by stating activities patient has completed that day Progressing as expected  -HG     Comments: Patient will demonstrate improved ability to recall information by stating activities patient has completed that day 10/28/21: Pt states that he recalls what he needs to recall.  -HG            Attention/Orientation Goals    Patient will be able to safely participate and attend to stimuli in structured  settings Independently  -HG     Status: Patient will be able to safely participate and attend to stimuli in structured settings New  -HG     Patient will improve attention skills by sustaining consistent behavioral response during continuous and repetitive activity (e.g., listening for target words, auditory/reading comprehension tasks) with cues; 10 minute task  -HG     Status: Patient will improve attention skills by sustaining consistent behavioral response during continuous and repetitive activity (e.g., listening for target words, auditory/reading comprehension tasks) Progressing as expected  -HG     Comments: Patient will improve attention skills by sustaining consistent behavioral response during continuous and repetitive activity (e.g., listening for target words, auditory/reading comprehension tasks) 10/28/21: Pt required re-directing x 5 during a 10 min task.  -HG     Patient will improve attention skills by sustaining focus in order to actively hold and manipulate information provided (e.g., sequencing auditorily presented number series in ascending or descending order) 80%:; with cues  -HG     Status: Patient will improve attention skills by sustaining focus in order to actively hold and manipulate information provided (e.g., sequencing auditorily presented number series in ascending or descending order) New  -HG     Patient will improve attention skills by focusing to selective target/task when presented with competing stimuli or in a distracting environment in order to complete task 80%:; with cues  -HG     Status: Patient will improve attention skills by focusing to selective target/task when presented with competing stimuli or in a distracting environment in order to complete task New  -HG     Patient will improve attention skills by alternating or shifting focus between two different tasks in order to complete both tasks 80%:; with cues  -HG     Status: Patient will improve attention skills by  alternating or shifting focus between two different tasks in order to complete both tasks New  -HG     Patient will improve attention skills by sustaining focus to high-level cognitive tasks in order to complete task 80%:; with cues  -HG     Status: Patient will improve attention skills by sustaining focus to high-level cognitive tasks in order to complete task New  -HG            Other Goals    Other Adult Goal- 1 Pt will improve RBANS Score to at lesat an 80 placing pt in the Low Average range.  -HG     Status: Other Adult Goal- 1 New  -HG     Other Adult Goal- 2 Pt will participate in further reasoning and problem solving assessment with recs to follow as indicated.  -HG     Status: Other Adult Goal- 2 New  -HG            SLP Time Calculation    SLP Goal Re-Cert Due Date 11/13/21  -HG           User Key  (r) = Recorded By, (t) = Taken By, (c) = Cosigned By    Initials Name Provider Type    Candy Bello MS CCC-SLP Speech and Language Pathologist               OP SLP Education     Row Name 10/28/21 0815       Education    Education Comments Education for importance of journaling and journal provided. Call to wife planned for later in day from SLP to explain visual aides provided to pt.  Hmwk for 4 related animals.  -HG          User Key  (r) = Recorded By, (t) = Taken By, (c) = Cosigned By    Initials Name Effective Dates    Candy Bello MS CCC-SLP 06/16/21 -                      Time Calculation:   SLP Start Time: 0815  Untimed Charges  50678-WR Treatment/ST Modification Prosth Aug Alter : 45  Total Minutes  Untimed Charges Total Minutes: 45   Total Minutes: 45    Therapy Charges for Today     Code Description Service Date Service Provider Modifiers Qty    99722829837  ST TREATMENT SPEECH 3 10/28/2021 Candy Rubin MS CCC-SLP GN 1                   Candy Rubin MS CCC-SLP  10/28/2021

## 2021-11-04 ENCOUNTER — HOSPITAL ENCOUNTER (OUTPATIENT)
Dept: SPEECH THERAPY | Facility: HOSPITAL | Age: 76
Setting detail: THERAPIES SERIES
Discharge: HOME | End: 2021-11-04

## 2021-11-04 DIAGNOSIS — G31.84 MILD COGNITIVE IMPAIRMENT: Primary | ICD-10-CM

## 2021-11-04 PROCEDURE — 92507 TX SP LANG VOICE COMM INDIV: CPT

## 2021-11-04 NOTE — THERAPY TREATMENT NOTE
Outpatient Speech Language Pathology   Adult Speech Language Cognitive Treatment Note  [facility]     Patient Name: Aj Marie  : 1945  MRN: [id number]  Today's Date: 2021         Visit Date: 2021   Patient Active Problem List   Diagnosis   • First degree atrioventricular block   • OA (osteoarthritis)   • Hearing loss   • Essential hypertension   • Candidate for statin therapy due to risk of future cardiovascular event   • Atypical chest pain   • Mild cognitive impairment   • Colon polyps   • IPMN (intraductal papillary mucinous neoplasm)   • Liver lesion   • NAFLD (nonalcoholic fatty liver disease)   • Urinary retention          Visit Dx:    ICD-10-CM ICD-9-CM   1. Mild cognitive impairment  G31.84 331.83        OP SLP Assessment/Plan - 21        SLP Plan    Plan Comments Cont with Cog tx.  -HG          User Key  (r) = Recorded By, (t) = Taken By, (c) = Cosigned By    Initials Name Provider Type     Candy Rubin MS CCC-SLP Speech and Language Pathologist                                   SLP OP Goals     Row Name 21          Goal Type Needed    Goal Type Needed Memory; Attention/Orientation; Other Adult Goals  -HG            Subjective Comments    Subjective Comments Pt alert, cooperative, pt reports feeling tired and had an episode this week of being disoriented when driving.  -HG            Memory Goals    Patient and family will implement compensatory strategies to maximize patient’s Memory function so patient can continue to participate in daily activities 90%:; with intermittent cues  -HG     Status: Patient and family will implement compensatory strategies to maximize patient’s Memory function so patient can continue to participate in daily activities Progress slower than expected  -HG     Comments: Patient and family will implement compensatory strategies to maximize patient’s Memory function so patient can continue to participate in daily activities  "11/4/21: Pt admits today that he can tell his memory is getting worse, he is having to think more.  -HG     Patient will demonstrate improved ability to recall information by immediately recalling a series of words 80%:; related; after delay; with cues  -HG     Status: Patient will demonstrate improved ability to recall information by immediately recalling a series of words Progressing as expected  -HG     Comments: Patient will demonstrate improved ability to recall information by immediately recalling a series of words 11/4/21: Word association for immediate recall and pt was 9/10, 8/10.  -HG     Patient’s memory skills will be enhanced as reported by patient by utilizing internal memory strategies to recall up to 3 pieces of information after a 5- minute delay 80%:; with cues  -HG     Status: Patient’s memory skills will be enhanced as reported by patient by utilizing internal memory strategies to recall up to 3 pieces of information after a 5- minute delay Progressing as expected  -HG     Comments: Patient’s memory skills will be enhanced as reported by patient by utilizing internal memory strategies to recall up to 3 pieces of information after a 5- minute delay 11/4/21: Delayed recall of 4 related animals and pt was 3/4, 4/4 x 3.  10/28/21: Delayed recall of 4 related animals and pt was 2/4, 3/4 x 2. Using a first letter initial and pt was 4/4.  -HG     Patient’s memory skills will be enhanced as reported by patient by using external memory aides 80%:; with cues  -HG     Status: Patient’s memory skills will be enhanced as reported by patient by using external memory aides Progressing as expected  -HG     Comments: Patient’s memory skills will be enhanced as reported by patient by using external memory aides 11/4/21: Pt stated, \"I did write in it.\" Pt doesn't think he wrote in it every day. Pt does know where the journal is at home. He does not know where the visual cue cards. 10/28/21: Provided a journal this " date with example entry for current date and provided 5 cue cards to place in specific areas around his home and car to prompt pt to write in the journal.  -HG     Patient will demonstrate improved ability to recall information by stating activities patient has completed that day 80%:; with cues  -HG     Status: Patient will demonstrate improved ability to recall information by stating activities patient has completed that day Progressing as expected  -HG     Comments: Patient will demonstrate improved ability to recall information by stating activities patient has completed that day 10/28/21: Pt states that he recalls what he needs to recall.  -HG            Attention/Orientation Goals    Patient will be able to safely participate and attend to stimuli in structured settings Independently  -HG     Status: Patient will be able to safely participate and attend to stimuli in structured settings New  -HG     Patient will improve attention skills by sustaining consistent behavioral response during continuous and repetitive activity (e.g., listening for target words, auditory/reading comprehension tasks) with cues; 10 minute task  -HG     Status: Patient will improve attention skills by sustaining consistent behavioral response during continuous and repetitive activity (e.g., listening for target words, auditory/reading comprehension tasks) Progressing as expected  -HG     Comments: Patient will improve attention skills by sustaining consistent behavioral response during continuous and repetitive activity (e.g., listening for target words, auditory/reading comprehension tasks) 10/28/21: Pt required re-directing x 5 during a 10 min task.  -HG     Patient will improve attention skills by sustaining focus in order to actively hold and manipulate information provided (e.g., sequencing auditorily presented number series in ascending or descending order) 80%:; with cues  -HG     Status: Patient will improve attention skills by  sustaining focus in order to actively hold and manipulate information provided (e.g., sequencing auditorily presented number series in ascending or descending order) New  -HG     Patient will improve attention skills by focusing to selective target/task when presented with competing stimuli or in a distracting environment in order to complete task 80%:; with cues  -HG     Status: Patient will improve attention skills by focusing to selective target/task when presented with competing stimuli or in a distracting environment in order to complete task Progressing as expected  -HG     Comments: Patient will improve attention skills by focusing to selective target/task when presented with competing stimuli or in a distracting environment in order to complete task 11/4/21: Sorting deck by red and black cards and pt was 100% accurate.  -HG     Patient will improve attention skills by alternating or shifting focus between two different tasks in order to complete both tasks 80%:; with cues  -HG     Status: Patient will improve attention skills by alternating or shifting focus between two different tasks in order to complete both tasks Progressing as expected  -HG     Comments: Patient will improve attention skills by alternating or shifting focus between two different tasks in order to complete both tasks 11/4/21: Alternating attention for word search for circling and putting a line throught it and pt was 70% accurate.  -HG     Patient will improve attention skills by sustaining focus to high-level cognitive tasks in order to complete task 80%:; with cues  -HG     Status: Patient will improve attention skills by sustaining focus to high-level cognitive tasks in order to complete task New  -HG            Other Goals    Other Adult Goal- 1 Pt will improve RBANS Score to at lesat an 80 placing pt in the Low Average range.  -HG     Status: Other Adult Goal- 1 New  -HG     Other Adult Goal- 2 Pt will participate in further  reasoning and problem solving assessment with recs to follow as indicated.  -HG     Status: Other Adult Goal- 2 New  -HG            SLP Time Calculation    SLP Goal Re-Cert Due Date 11/13/21  -HG           User Key  (r) = Recorded By, (t) = Taken By, (c) = Cosigned By    Initials Name Provider Type    Candy Bello MS CCC-SLP Speech and Language Pathologist               OP SLP Education     Row Name 11/04/21 0900       Education    Education Comments Education for journaling, sample journal provided as well as delayed recall for 5 related animals and an attention task.  -HG          User Key  (r) = Recorded By, (t) = Taken By, (c) = Cosigned By    Initials Name Effective Dates    Candy Bello MS CCC-SLP 06/16/21 -                      Time Calculation:   SLP Start Time: 0900  Untimed Charges  63929-OY Treatment/ST Modification Prosth Aug Alter : 60  Total Minutes  Untimed Charges Total Minutes: 60   Total Minutes: 60    Therapy Charges for Today     Code Description Service Date Service Provider Modifiers Qty    [id number]  ST TREATMENT SPEECH 4 11/4/2021 Candy Rubin MS CCC-SLP GN 1                   Candy Rubin MS CCC-SLP  11/4/2021

## 2021-11-18 ENCOUNTER — HOSPITAL ENCOUNTER (OUTPATIENT)
Dept: SPEECH THERAPY | Facility: HOSPITAL | Age: 76
Setting detail: THERAPIES SERIES
Discharge: HOME | End: 2021-11-18

## 2021-11-18 DIAGNOSIS — G31.84 MILD COGNITIVE IMPAIRMENT: Primary | ICD-10-CM

## 2021-11-18 PROCEDURE — 92507 TX SP LANG VOICE COMM INDIV: CPT

## 2021-11-18 NOTE — THERAPY TREATMENT NOTE
Outpatient Speech Language Pathology   Adult Speech Language Cognitive Treatment Note  [facility]     Patient Name: Aj Marie  : 1945  MRN: [id number]  Today's Date: 2021         Visit Date: 2021   Patient Active Problem List   Diagnosis   • First degree atrioventricular block   • OA (osteoarthritis)   • Hearing loss   • Essential hypertension   • Candidate for statin therapy due to risk of future cardiovascular event   • Atypical chest pain   • Mild cognitive impairment   • Colon polyps   • IPMN (intraductal papillary mucinous neoplasm)   • Liver lesion   • NAFLD (nonalcoholic fatty liver disease)   • Urinary retention          Visit Dx:    ICD-10-CM ICD-9-CM   1. Mild cognitive impairment  G31.84 331.83        OP SLP Assessment/Plan - 21        SLP Plan    Plan Comments Cont with Cog tx.  -HG          User Key  (r) = Recorded By, (t) = Taken By, (c) = Cosigned By    Initials Name Provider Type     Candy Rubin MS CCC-SLP Speech and Language Pathologist                                   SLP OP Goals     Row Name 21          Goal Type Needed    Goal Type Needed Memory; Attention/Orientation; Other Adult Goals  -HG            Subjective Comments    Subjective Comments Pt alert, cooperative, returned with journal.  -HG            Memory Goals    Patient and family will implement compensatory strategies to maximize patient’s Memory function so patient can continue to participate in daily activities 90%:; with intermittent cues  -HG     Status: Patient and family will implement compensatory strategies to maximize patient’s Memory function so patient can continue to participate in daily activities Progress slower than expected  -HG     Comments: Patient and family will implement compensatory strategies to maximize patient’s Memory function so patient can continue to participate in daily activities 21: Pt admits today that he can tell his memory is getting  worse, he is having to think more.  -HG     Patient will demonstrate improved ability to recall information by immediately recalling a series of words 80%:; related; after delay; with cues  -HG     Status: Patient will demonstrate improved ability to recall information by immediately recalling a series of words Progressing as expected  -HG     Comments: Patient will demonstrate improved ability to recall information by immediately recalling a series of words 11/18/21: Immediate recall of picture scene without notes and pt was 10% accurate; with use of notes, pt was 40% accurate.  11/4/21: Word association for immediate recall and pt was 9/10, 8/10.  -HG     Patient’s memory skills will be enhanced as reported by patient by utilizing internal memory strategies to recall up to 3 pieces of information after a 5- minute delay 80%:; with cues  -HG     Status: Patient’s memory skills will be enhanced as reported by patient by utilizing internal memory strategies to recall up to 3 pieces of information after a 5- minute delay Progressing as expected  -HG     Comments: Patient’s memory skills will be enhanced as reported by patient by utilizing internal memory strategies to recall up to 3 pieces of information after a 5- minute delay 11/4/21: Delayed recall of 4 related animals and pt was 3/4, 4/4 x 3.  10/28/21: Delayed recall of 4 related animals and pt was 2/4, 3/4 x 2. Using a first letter initial and pt was 4/4.  -HG     Patient’s memory skills will be enhanced as reported by patient by using external memory aides 80%:; with cues  -HG     Status: Patient’s memory skills will be enhanced as reported by patient by using external memory aides Progressing as expected  -HG     Comments: Patient’s memory skills will be enhanced as reported by patient by using external memory aides 11/18/21: Pt did write in his journal and education with wife over the phone stated that wife provided a lot of cues and prompting in order to get  "entries. Education regarding the need for specific details.  11/4/21: Pt stated, \"I did write in it.\" Pt doesn't think he wrote in it every day. Pt does know where the journal is at home. He does not know where the visual cue cards. 10/28/21: Provided a journal this date with example entry for current date and provided 5 cue cards to place in specific areas around his home and car to prompt pt to write in the journal.  -HG     Patient will demonstrate improved ability to recall information by stating activities patient has completed that day 80%:; with cues  -HG     Status: Patient will demonstrate improved ability to recall information by stating activities patient has completed that day Progressing as expected  -HG     Comments: Patient will demonstrate improved ability to recall information by stating activities patient has completed that day 10/28/21: Pt states that he recalls what he needs to recall.  -HG            Attention/Orientation Goals    Patient will be able to safely participate and attend to stimuli in structured settings Independently  -HG     Status: Patient will be able to safely participate and attend to stimuli in structured settings New  -HG     Patient will improve attention skills by sustaining consistent behavioral response during continuous and repetitive activity (e.g., listening for target words, auditory/reading comprehension tasks) with cues; 10 minute task  -HG     Status: Patient will improve attention skills by sustaining consistent behavioral response during continuous and repetitive activity (e.g., listening for target words, auditory/reading comprehension tasks) Progressing as expected  -HG     Comments: Patient will improve attention skills by sustaining consistent behavioral response during continuous and repetitive activity (e.g., listening for target words, auditory/reading comprehension tasks) 10/28/21: Pt required re-directing x 5 during a 10 min task.  -HG     Patient will " improve attention skills by sustaining focus in order to actively hold and manipulate information provided (e.g., sequencing auditorily presented number series in ascending or descending order) 80%:; with cues  -HG     Status: Patient will improve attention skills by sustaining focus in order to actively hold and manipulate information provided (e.g., sequencing auditorily presented number series in ascending or descending order) New  -HG     Comments: Patient will improve attention skills by sustaining focus in order to actively hold and manipulate information provided (e.g., sequencing auditorily presented number series in ascending or descending order) 3/11/21: Digit Span and pt was up to 8 digits. With distraction cards, pt was 90% accurate. 12/3/20: Mental Manipulation for word progression: pt was 90% accurate.  10/22/20: Digit Span: pt continues to be successful with 6 digits. 7 digits: 6/7. 9/3/20: Digit Span: pt was able to recall up to 6 digits.   -HG     Patient will improve attention skills by focusing to selective target/task when presented with competing stimuli or in a distracting environment in order to complete task 80%:; with cues  -HG     Status: Patient will improve attention skills by focusing to selective target/task when presented with competing stimuli or in a distracting environment in order to complete task Progressing as expected  -HG     Comments: Patient will improve attention skills by focusing to selective target/task when presented with competing stimuli or in a distracting environment in order to complete task 11/4/21: Sorting deck by red and black cards and pt was 100% accurate.  -HG     Patient will improve attention skills by alternating or shifting focus between two different tasks in order to complete both tasks 80%:; with cues  -HG     Status: Patient will improve attention skills by alternating or shifting focus between two different tasks in order to complete both tasks  Progressing as expected  -HG     Comments: Patient will improve attention skills by alternating or shifting focus between two different tasks in order to complete both tasks 11/18/21: Card sorting by suits and then flipping over cards that have the letter N in them and pt was 80% accurate. 11/4/21: Alternating attention for word search for circling and putting a line throught it and pt was 70% accurate.  -HG     Patient will improve attention skills by sustaining focus to high-level cognitive tasks in order to complete task 80%:; with cues  -HG     Status: Patient will improve attention skills by sustaining focus to high-level cognitive tasks in order to complete task New  -HG     Comments: Patient will improve attention skills by sustaining focus to high-level cognitive tasks in order to complete task 3/11/21: Visuospatial for shapes and figures and pt was 70% accurate. 2/4/21: Clock drawing and setting clock to accurate time: pt struggled initially with making the hour and minute hand the same length and with further instruction and cues, pt was 12/3/20: One Pile Card Game: pt was 80% accurate with mod cues. Pt continues to be impulsive when playing Independently and was 70% accurate.  11/19/20: One Pile Card Game: pt was impulsive and required mod cues and was 80% accurate.  10/15/20: One Pile Card Game: reviewed before play: pt was 80%accurate. 8/27/20: One Pile Card Game: Pt was 75% accurate.   -HG            Other Goals    Other Adult Goal- 1 Pt will improve RBANS Score to at lesat an 80 placing pt in the Low Average range.  -HG     Status: Other Adult Goal- 1 New  -HG     Comments: Other Adult Goal- 1 5/6/21: MOCA total score of 21 places pt in mild cognitive impairment range. 4/22/21: SLUMS score of 20 places pt in Dementia range. 3/25/21: RBANS Total Score of 78 falls in the [place] Range and pt was at 74. 1/21/21: RBANS Total score of 74 is down from 82 at time of last re-assessment.  11/5/30: RBANS  Total of 82 improved from a 77. 10/1/20: FROMAJE: Pt score a 13- Stage 4.3. 9/10/20: RBANS not re-administered this date, pt given SLUMS and scored a 19/30.   -HG     Other Adult Goal- 2 Pt will participate in further reasoning and problem solving assessment with recs to follow as indicated.  -HG     Status: Other Adult Goal- 2 New  -HG     Comments: Other Adult Goal- 2 12/17/20: Language and reasoning for game and pt was 90% accurate.  10/22/20: Deduction puzzle: pt was 70% accurate.    -HG            SLP Time Calculation    SLP Goal Re-Cert Due Date 12/18/21  -HG           User Key  (r) = Recorded By, (t) = Taken By, (c) = Cosigned By    Initials Name Provider Type    Candy Bello MS CCC-SLP Speech and Language Pathologist               OP SLP Education     Row Name 11/18/21 0915       Education    Education Comments Education with pt and wife (via phone) on the importance and why of journaling and to be specific in the notes.  -HG          User Key  (r) = Recorded By, (t) = Taken By, (c) = Cosigned By    Initials Name Effective Dates    Candy Bello MS CCC-SLP 06/16/21 -                      Time Calculation:   SLP Start Time: 0915  Untimed Charges  59785-WX Treatment/ST Modification Prosth Aug Alter : 45  Total Minutes  Untimed Charges Total Minutes: 45   Total Minutes: 45    Therapy Charges for Today     Code Description Service Date Service Provider Modifiers Qty    24409326715 HC ST TREATMENT SPEECH 3 11/18/2021 Candy Rubin MS CCC-SLP GN 1                   Candy Rubin MS CCC-SLP  11/18/2021

## 2021-12-01 ENCOUNTER — TELEPHONE (OUTPATIENT)
Dept: NEUROLOGY | Facility: CLINIC | Age: 76
End: 2021-12-01

## 2021-12-01 NOTE — TELEPHONE ENCOUNTER
Provider: ANCA  Caller: ALPHONSO BAKER  Relationship to Patient: WIFE  Pharmacy: N/A  Phone Number: [phone]  Reason for Call: PT WIFE TEL RE: CONCERNS W/MEDICATION; MEMANTINE (NAMENDA) 10 MG TABLET; PT IS EXPERIENCING UNBALANCING/DIZZINESS WHEN HE FIRST AWAKENS; THIS SITUATION DISSIPATES AS THE DAY GOES ON.    WONDERING IF THIS COULD BE DUE TO TITRATION OF THE MEDICATION; DID NOT OCCUR UNTIL DOSAGE UPDATED TO WHOLE TABLET PRIOR TO BEDTIME.    PLEASE CALL & ADVISE.    THANK YOU.

## 2021-12-01 NOTE — TELEPHONE ENCOUNTER
Yes, this can happen when increasing the memantine. If this has happened for more than 5-7 days on the current memantine dose, I would lower back to the prior dose he was tolerating and continue at that dose until we follow up. Thanks, Gisell

## 2021-12-02 NOTE — TELEPHONE ENCOUNTER
I SPOKE WITH PT'S WIFE, ALPHONSO, AND SHE IS AWARE OF THE RECOMMENDATIONS PER CHAY MARCH AND WILL CALL IF NEEDED

## 2021-12-16 ENCOUNTER — HOSPITAL ENCOUNTER (OUTPATIENT)
Dept: SPEECH THERAPY | Facility: HOSPITAL | Age: 76
Setting detail: THERAPIES SERIES
Discharge: HOME | End: 2021-12-16

## 2021-12-16 DIAGNOSIS — G31.84 MILD COGNITIVE IMPAIRMENT: Primary | ICD-10-CM

## 2021-12-16 PROCEDURE — 92507 TX SP LANG VOICE COMM INDIV: CPT

## 2021-12-16 NOTE — THERAPY PROGRESS REPORT/RE-CERT
Outpatient Speech Language Pathology   Adult Speech Language Cognitive Progress Note  [facility]     Patient Name: Aj Marie  : 1945  MRN: [id number]  Today's Date: 2021         Visit Date: 2021   Patient Active Problem List   Diagnosis   • First degree atrioventricular block   • OA (osteoarthritis)   • Hearing loss   • Essential hypertension   • Candidate for statin therapy due to risk of future cardiovascular event   • Atypical chest pain   • Mild cognitive impairment   • Colon polyps   • IPMN (intraductal papillary mucinous neoplasm)   • Liver lesion   • NAFLD (nonalcoholic fatty liver disease)   • Urinary retention          Visit Dx:    ICD-10-CM ICD-9-CM   1. Mild cognitive impairment  G31.84 331.83        OP SLP Assessment/Plan - 21 0915        SLP Assessment    Functional Problems Speech Language- Adult/Cognition  -HG    Impact on Function: Adult Speech Language/Cognition Restrictions in personal and social life; Difficulty sequencing thoughts to express complex messages; Difficulty following directions; Unrealistic view of abilities/deficits; Unable to complete specified job requirements; Trouble learning or remembering new information; Poor attention to task  -HG    Clinical Impression: Speech Language-Adult/Congnition Mild-Moderate:; Cognitive Communication Impairment  -HG    Functional Problems Comment Pt states that he isn't doing things at home as he should.  -HG    Clinical Impression Comments SLUMS Total score of 22/30 places pt in the MNCD  -HG    Please refer to paper survey for additional self-reported information Yes  -HG    Please refer to items scanned into chart for additional diagnostic informaiton and handouts as provided by clinician Yes  -HG    SLP Diagnosis Mild to Moderate Cognitive impairment  -HG    Prognosis Excellent (comment)  -HG    Patient/caregiver participated in establishment of treatment plan and goals Yes  -HG    Patient would benefit from  skilled therapy intervention Yes  -HG       SLP Plan    Frequency 1x/week  -HG    Duration 8 weeks  -HG    Planned CPT's? SLP SPEECH & LANGUAGE EVAL: 51695; SLP INDIVIDUAL SPEECH THERAPY: 39356  -HG    Expected Duration of Therapy Session (SLP Eval) 60  -HG    Plan Comments Cont with Cog tx.  -HG          User Key  (r) = Recorded By, (t) = Taken By, (c) = Cosigned By    Initials Name Provider Type    HG Candy Rubin, MS CCC-SLP Speech and Language Pathologist                                   SLP OP Goals     Row Name 12/16/21 0915          Goal Type Needed    Goal Type Needed Memory; Attention/Orientation; Other Adult Goals  -HG            Subjective Comments    Subjective Comments Pt alert, cooperative, returned with no folder and in a very much rhyming mood. Pt notes blurry vision and had difficulty with paper/pencil tasks.  -HG            Memory Goals    Patient and family will implement compensatory strategies to maximize patient’s Memory function so patient can continue to participate in daily activities 90%:; with intermittent cues  -HG     Status: Patient and family will implement compensatory strategies to maximize patient’s Memory function so patient can continue to participate in daily activities Progress slower than expected  -HG     Comments: Patient and family will implement compensatory strategies to maximize patient’s Memory function so patient can continue to participate in daily activities 12/16/21: Pt not using his journal and exhibited frequent repetition this date during the session. 11/4/21: Pt admits today that he can tell his memory is getting worse, he is having to think more.  -HG     Patient will demonstrate improved ability to recall information by immediately recalling a series of words 80%:; related; after delay; with cues  -HG     Status: Patient will demonstrate improved ability to recall information by immediately recalling a series of words Progressing as expected  -HG      "Comments: Patient will demonstrate improved ability to recall information by immediately recalling a series of words 12/16/21: SLUMS Immediate recall for paragraph info and pt was 4/8. 11/18/21: Immediate recall of picture scene without notes and pt was 10% accurate; with use of notes, pt was 40% accurate.  11/4/21: Word association for immediate recall and pt was 9/10, 8/10.  -HG     Patient’s memory skills will be enhanced as reported by patient by utilizing internal memory strategies to recall up to 3 pieces of information after a 5- minute delay 80%:; with cues  -HG     Status: Patient’s memory skills will be enhanced as reported by patient by utilizing internal memory strategies to recall up to 3 pieces of information after a 5- minute delay Progressing as expected  -HG     Comments: Patient’s memory skills will be enhanced as reported by patient by utilizing internal memory strategies to recall up to 3 pieces of information after a 5- minute delay 12/16/21: SLUMS 5 object recall and pt was 3/5. Delayed recall of 4 related animals and p twas 3/4.   11/4/21: Delayed recall of 4 related animals and pt was 3/4, 4/4 x 3.  10/28/21: Delayed recall of 4 related animals and pt was 2/4, 3/4 x 2. Using a first letter initial and pt was 4/4.  -HG     Patient’s memory skills will be enhanced as reported by patient by using external memory aides 80%:; with cues  -HG     Status: Patient’s memory skills will be enhanced as reported by patient by using external memory aides Progressing as expected  -HG     Comments: Patient’s memory skills will be enhanced as reported by patient by using external memory aides 12/16/21: Pt did not return with journal and admitted to not writing in it. 11/18/21: Pt did write in his journal and education with wife over the phone stated that wife provided a lot of cues and prompting in order to get entries. Education regarding the need for specific details.  11/4/21: Pt stated, \"I did write in " "it.\" Pt doesn't think he wrote in it every day. Pt does know where the journal is at home. He does not know where the visual cue cards. 10/28/21: Provided a journal this date with example entry for current date and provided 5 cue cards to place in specific areas around his home and car to prompt pt to write in the journal.  -HG     Patient will demonstrate improved ability to recall information by stating activities patient has completed that day 80%:; with cues  -HG     Status: Patient will demonstrate improved ability to recall information by stating activities patient has completed that day Progressing as expected  -HG     Comments: Patient will demonstrate improved ability to recall information by stating activities patient has completed that day 12/16/21: Pt oriented to date, year, location but repeated himself throughout the session. 10/28/21: Pt states that he recalls what he needs to recall.  -HG            Attention/Orientation Goals    Patient will be able to safely participate and attend to stimuli in structured settings Independently  -HG     Status: Patient will be able to safely participate and attend to stimuli in structured settings Progressing as expected  -HG     Comments: Patient will be able to safely participate and attend to stimuli in structured settings 12/16/21: Pt exhibited impulsivity this date and admitted once cued.  -HG     Patient will improve attention skills by sustaining consistent behavioral response during continuous and repetitive activity (e.g., listening for target words, auditory/reading comprehension tasks) with cues; 10 minute task  -HG     Status: Patient will improve attention skills by sustaining consistent behavioral response during continuous and repetitive activity (e.g., listening for target words, auditory/reading comprehension tasks) Progressing as expected  -HG     Comments: Patient will improve attention skills by sustaining consistent behavioral response during " continuous and repetitive activity (e.g., listening for target words, auditory/reading comprehension tasks) /28/21: Pt required re-directing x 5 during a 10 min task.  -HG     Patient will improve attention skills by sustaining focus in order to actively hold and manipulate information provided (e.g., sequencing auditorily presented number series in ascending or descending order) 80%:; with cues  -HG     Status: Patient will improve attention skills by sustaining focus in order to actively hold and manipulate information provided (e.g., sequencing auditorily presented number series in ascending or descending order) New; Progressing as expected  -HG     Comments: Patient will improve attention skills by sustaining focus in order to actively hold and manipulate information provided (e.g., sequencing auditorily presented number series in ascending or descending order) 12/16/21: SLUMS Digit reversal and pt was 2/2 up to 4 digits.  -HG     Patient will improve attention skills by focusing to selective target/task when presented with competing stimuli or in a distracting environment in order to complete task 80%:; with cues  -HG     Status: Patient will improve attention skills by focusing to selective target/task when presented with competing stimuli or in a distracting environment in order to complete task Progressing as expected  -HG     Comments: Patient will improve attention skills by focusing to selective target/task when presented with competing stimuli or in a distracting environment in order to complete task 11/4/21: Sorting deck by red and black cards and pt was 100% accurate.  -HG     Patient will improve attention skills by alternating or shifting focus between two different tasks in order to complete both tasks 80%:; with cues  -HG     Status: Patient will improve attention skills by alternating or shifting focus between two different tasks in order to complete both tasks Progressing as expected  -HG      Comments: Patient will improve attention skills by alternating or shifting focus between two different tasks in order to complete both tasks 12/16/21: Connecting the pictures and pt was 50% accurate, mod-severe impulsivity. 11/18/21: Card sorting by suits and then flipping over cards that have the letter N in them and pt was 80% accurate. 11/4/21: Alternating attention for word search for circling and putting a line throught it and pt was 70% accurate.  -HG     Patient will improve attention skills by sustaining focus to high-level cognitive tasks in order to complete task 80%:; with cues  -HG     Status: Patient will improve attention skills by sustaining focus to high-level cognitive tasks in order to complete task New  -HG     Comments: Patient will improve attention skills by sustaining focus to high-level cognitive tasks in order to complete task --  -HG            Other Goals    Other Adult Goal- 1 Pt will improve RBANS Score to at lesat an 80 placing pt in the Low Average range.  -HG     Status: Other Adult Goal- 1 New; Progressing as expected  -HG     Comments: Other Adult Goal- 1 12/16/21: SLUMS Score of 22 places pt in the Mild range.  -HG     Other Adult Goal- 2 Pt will participate in further reasoning and problem solving assessment with recs to follow as indicated.  -HG     Status: Other Adult Goal- 2 New; Progressing as expected  -HG     Comments: Other Adult Goal- 2 12/16/21: Reasoning puzzle and pt was 50% accurate due to attention and impulsivity.  -HG            SLP Time Calculation    SLP Goal Re-Cert Due Date 01/15/22  -HG           User Key  (r) = Recorded By, (t) = Taken By, (c) = Cosigned By    Initials Name Provider Type    Candy Bello MS CCC-SLP Speech and Language Pathologist               OP SLP Education     Row Name 12/16/21 0915       Education    Education Comments Education for attention and use of his journal.  -HG          User Key  (r) = Recorded By, (t) = Taken By, (c) =  Cosigned By    Initials Name Effective Dates     Candy Rubin MS CCC-SLP 06/16/21 -                      Time Calculation:   SLP Start Time: 0915  Untimed Charges  56935-FH Treatment/ST Modification Prosth Aug Alter : 75  Total Minutes  Untimed Charges Total Minutes: 75   Total Minutes: 75    Therapy Charges for Today     Code Description Service Date Service Provider Modifiers Qty    26826858572 HC ST TREATMENT SPEECH 5 12/16/2021 Candy Rubin MS CCC-SLP GN 1                   Candy Rubin MS CCC-SLP  12/16/2021

## 2021-12-23 ENCOUNTER — HOSPITAL ENCOUNTER (OUTPATIENT)
Dept: SPEECH THERAPY | Facility: HOSPITAL | Age: 76
Setting detail: THERAPIES SERIES
Discharge: HOME | End: 2021-12-23

## 2021-12-23 DIAGNOSIS — G31.84 MILD COGNITIVE IMPAIRMENT: Primary | ICD-10-CM

## 2021-12-23 PROCEDURE — 92507 TX SP LANG VOICE COMM INDIV: CPT

## 2021-12-23 NOTE — THERAPY TREATMENT NOTE
Outpatient Speech Language Pathology   Adult Speech Language Cognitive Treatment Note  Psych[facility]     Patient Name: Aj Marie  : 1945  MRN: [id number]  Today's Date: 2021         Visit Date: 2021   Patient Active Problem List   Diagnosis   • First degree atrioventricular block   • OA (osteoarthritis)   • Hearing loss   • Essential hypertension   • Candidate for statin therapy due to risk of future cardiovascular event   • Atypical chest pain   • Mild cognitive impairment   • Colon polyps   • IPMN (intraductal papillary mucinous neoplasm)   • Liver lesion   • NAFLD (nonalcoholic fatty liver disease)   • Urinary retention          Visit Dx:    ICD-10-CM ICD-9-CM   1. Mild cognitive impairment  G31.84 331.83        OP SLP Assessment/Plan - 21 09        SLP Plan    Plan Comments Cont with Cog tx.  -HG          User Key  (r) = Recorded By, (t) = Taken By, (c) = Cosigned By    Initials Name Provider Type     Candy Rubin MS CCC-SLP Speech and Language Pathologist                                   SLP OP Goals     Row Name 21          Goal Type Needed    Goal Type Needed Memory; Attention/Orientation; Other Adult Goals  -HG            Subjective Comments    Subjective Comments Pt alert, cooperative, did not return with journal.  -HG            Memory Goals    Patient and family will implement compensatory strategies to maximize patient’s Memory function so patient can continue to participate in daily activities 90%:; with intermittent cues  -HG     Status: Patient and family will implement compensatory strategies to maximize patient’s Memory function so patient can continue to participate in daily activities Progress slower than expected  -HG     Comments: Patient and family will implement compensatory strategies to maximize patient’s Memory function so patient can continue to participate in daily activities 21: Pt not using his journal and exhibited frequent  repetition this date during the session. 11/4/21: Pt admits today that he can tell his memory is getting worse, he is having to think more.  -HG     Patient will demonstrate improved ability to recall information by immediately recalling a series of words 80%:; related; after delay; with cues  -HG     Status: Patient will demonstrate improved ability to recall information by immediately recalling a series of words Progressing as expected  -HG     Comments: Patient will demonstrate improved ability to recall information by immediately recalling a series of words 12/23/21: Immediate recall for 2 cards out of 10 and 12 and pt was 2/5. 12/16/21: SLUMS Immediate recall for paragraph info and pt was 4/8. 11/18/21: Immediate recall of picture scene without notes and pt was 10% accurate; with use of notes, pt was 40% accurate.  11/4/21: Word association for immediate recall and pt was 9/10, 8/10.  -HG     Patient’s memory skills will be enhanced as reported by patient by utilizing internal memory strategies to recall up to 3 pieces of information after a 5- minute delay 80%:; with cues  -HG     Status: Patient’s memory skills will be enhanced as reported by patient by utilizing internal memory strategies to recall up to 3 pieces of information after a 5- minute delay Progressing as expected  -HG     Comments: Patient’s memory skills will be enhanced as reported by patient by utilizing internal memory strategies to recall up to 3 pieces of information after a 5- minute delay 12/16/21: SLUMS 5 object recall and pt was 3/5. Delayed recall of 4 related animals and p twas 3/4.   11/4/21: Delayed recall of 4 related animals and pt was 3/4, 4/4 x 3.  10/28/21: Delayed recall of 4 related animals and pt was 2/4, 3/4 x 2. Using a first letter initial and pt was 4/4.  -HG     Patient’s memory skills will be enhanced as reported by patient by using external memory aides 80%:; with cues  -HG     Status: Patient’s memory skills will be  "enhanced as reported by patient by using external memory aides Progressing as expected  -HG     Comments: Patient’s memory skills will be enhanced as reported by patient by using external memory aides 12/16/21: Pt did not return with journal and admitted to not writing in it. 11/18/21: Pt did write in his journal and education with wife over the phone stated that wife provided a lot of cues and prompting in order to get entries. Education regarding the need for specific details.  11/4/21: Pt stated, \"I did write in it.\" Pt doesn't think he wrote in it every day. Pt does know where the journal is at home. He does not know where the visual cue cards. 10/28/21: Provided a journal this date with example entry for current date and provided 5 cue cards to place in specific areas around his home and car to prompt pt to write in the journal.  -HG     Patient will demonstrate improved ability to recall information by stating activities patient has completed that day 80%:; with cues  -HG     Status: Patient will demonstrate improved ability to recall information by stating activities patient has completed that day Progressing as expected  -HG     Comments: Patient will demonstrate improved ability to recall information by stating activities patient has completed that day 12/23/21: Pt not aware of exact date and could not tell me his Roxy plans. 12/16/21: Pt oriented to date, year, location but repeated himself throughout the session. 10/28/21: Pt states that he recalls what he needs to recall.  -HG            Attention/Orientation Goals    Patient will be able to safely participate and attend to stimuli in structured settings Independently  -HG     Status: Patient will be able to safely participate and attend to stimuli in structured settings Progressing as expected  -HG     Comments: Patient will be able to safely participate and attend to stimuli in structured settings 12/16/21: Pt exhibited impulsivity this date and " admitted once cued.  -HG     Patient will improve attention skills by sustaining consistent behavioral response during continuous and repetitive activity (e.g., listening for target words, auditory/reading comprehension tasks) with cues; 10 minute task  -HG     Status: Patient will improve attention skills by sustaining consistent behavioral response during continuous and repetitive activity (e.g., listening for target words, auditory/reading comprehension tasks) Progressing as expected  -HG     Comments: Patient will improve attention skills by sustaining consistent behavioral response during continuous and repetitive activity (e.g., listening for target words, auditory/reading comprehension tasks) 12/23/21: Sustained attention for word scramble and pt was 80% accurate. 10/28/21: Pt required re-directing x 5 during a 10 min task.  -HG     Patient will improve attention skills by sustaining focus in order to actively hold and manipulate information provided (e.g., sequencing auditorily presented number series in ascending or descending order) 80%:; with cues  -HG     Status: Patient will improve attention skills by sustaining focus in order to actively hold and manipulate information provided (e.g., sequencing auditorily presented number series in ascending or descending order) New; Progressing as expected  -HG     Comments: Patient will improve attention skills by sustaining focus in order to actively hold and manipulate information provided (e.g., sequencing auditorily presented number series in ascending or descending order) 12/16/21: SLUMS Digit reversal and pt was 2/2 up to 4 digits.  -HG     Patient will improve attention skills by focusing to selective target/task when presented with competing stimuli or in a distracting environment in order to complete task 80%:; with cues  -HG     Status: Patient will improve attention skills by focusing to selective target/task when presented with competing stimuli or in a  distracting environment in order to complete task Progressing as expected  -HG     Comments: Patient will improve attention skills by focusing to selective target/task when presented with competing stimuli or in a distracting environment in order to complete task 11/4/21: Sorting deck by red and black cards and pt was 100% accurate.  -HG     Patient will improve attention skills by alternating or shifting focus between two different tasks in order to complete both tasks 80%:; with cues  -HG     Status: Patient will improve attention skills by alternating or shifting focus between two different tasks in order to complete both tasks Progressing as expected  -HG     Comments: Patient will improve attention skills by alternating or shifting focus between two different tasks in order to complete both tasks 12/16/21: Connecting the pictures and pt was 50% accurate, mod-severe impulsivity. 11/18/21: Card sorting by suits and then flipping over cards that have the letter N in them and pt was 80% accurate. 11/4/21: Alternating attention for word search for circling and putting a line throught it and pt was 70% accurate.  -HG     Patient will improve attention skills by sustaining focus to high-level cognitive tasks in order to complete task 80%:; with cues  -HG     Status: Patient will improve attention skills by sustaining focus to high-level cognitive tasks in order to complete task New  -HG     Comments: Patient will improve attention skills by sustaining focus to high-level cognitive tasks in order to complete task 3/11/21: Visuospatial for shapes and figures and pt was 70% accurate. 2/4/21: Clock drawing and setting clock to accurate time: pt struggled initially with making the hour and minute hand the same length and with further instruction and cues, pt was 12/3/20: One Pile Card Game: pt was 80% accurate with mod cues. Pt continues to be impulsive when playing Independently and was 70% accurate.  11/19/20: One Pile  Card Game: pt was impulsive and required mod cues and was 80% accurate.  10/15/20: One Pile Card Game: reviewed before play: pt was 80%accurate. 8/27/20: One Pile Card Game: Pt was 75% accurate.   -HG            Other Goals    Other Adult Goal- 1 Pt will improve RBANS Score to at lesat an 80 placing pt in the Low Average range.  -HG     Status: Other Adult Goal- 1 New; Progressing as expected  -HG     Comments: Other Adult Goal- 1 12/16/21: SLUMS Score of 22 places pt in the Mild range.  -HG     Other Adult Goal- 2 Pt will participate in further reasoning and problem solving assessment with recs to follow as indicated.  -HG     Status: Other Adult Goal- 2 New; Progressing as expected  -HG     Comments: Other Adult Goal- 2 12/23/21: Roxy word scramble and pt was 90% accurate. 12/16/21: Reasoning puzzle and pt was 50% accurate due to attention and impulsivity.  -HG            SLP Time Calculation    SLP Goal Re-Cert Due Date 01/15/22  -HG           User Key  (r) = Recorded By, (t) = Taken By, (c) = Cosigned By    Initials Name Provider Type    Candy Bello MS CCC-SLP Speech and Language Pathologist               OP SLP Education     Row Name 12/23/21 0900       Education    Education Comments Education for journaling and details in journal.  -HG          User Key  (r) = Recorded By, (t) = Taken By, (c) = Cosigned By    Initials Name Effective Dates    Candy Bello MS CCC-SLP 06/16/21 -                      Time Calculation:   SLP Start Time: 0900  Untimed Charges  50650-XH Treatment/ST Modification Prosth Aug Alter : 60  Total Minutes  Untimed Charges Total Minutes: 60   Total Minutes: 60    Therapy Charges for Today     Code Description Service Date Service Provider Modifiers Qty    83688087789 HC ST TREATMENT SPEECH 4 12/23/2021 Candy Rubin MS CCC-SLP GN 1                   Candy Rubin MS CCC-SLP  12/23/2021

## 2021-12-30 ENCOUNTER — HOSPITAL ENCOUNTER (OUTPATIENT)
Dept: SPEECH THERAPY | Facility: HOSPITAL | Age: 76
Setting detail: THERAPIES SERIES
Discharge: HOME | End: 2021-12-30

## 2022-01-06 ENCOUNTER — APPOINTMENT (OUTPATIENT)
Dept: SPEECH THERAPY | Facility: HOSPITAL | Age: 77
End: 2022-01-06

## 2022-01-13 ENCOUNTER — APPOINTMENT (OUTPATIENT)
Dept: SPEECH THERAPY | Facility: HOSPITAL | Age: 77
End: 2022-01-13

## 2022-01-20 ENCOUNTER — APPOINTMENT (OUTPATIENT)
Dept: SPEECH THERAPY | Facility: HOSPITAL | Age: 77
End: 2022-01-20

## 2022-02-03 ENCOUNTER — HOSPITAL ENCOUNTER (OUTPATIENT)
Dept: SPEECH THERAPY | Facility: HOSPITAL | Age: 77
Setting detail: THERAPIES SERIES
Discharge: HOME | End: 2022-02-03

## 2022-02-03 DIAGNOSIS — G31.84 MILD COGNITIVE IMPAIRMENT: Primary | ICD-10-CM

## 2022-02-03 PROCEDURE — 92507 TX SP LANG VOICE COMM INDIV: CPT

## 2022-02-03 NOTE — THERAPY PROGRESS REPORT/RE-CERT
Outpatient Speech Language Pathology   Adult Speech Language Cognitive Progress Note  [facility]     Patient Name: Aj Marie  : 1945  MRN: [id number]  Today's Date: 2/3/2022         Visit Date: 2022   Patient Active Problem List   Diagnosis   • First degree atrioventricular block   • OA (osteoarthritis)   • Hearing loss   • Essential hypertension   • Candidate for statin therapy due to risk of future cardiovascular event   • Atypical chest pain   • Mild cognitive impairment   • Colon polyps   • IPMN (intraductal papillary mucinous neoplasm)   • Liver lesion   • NAFLD (nonalcoholic fatty liver disease)   • Urinary retention          Visit Dx:    ICD-10-CM ICD-9-CM   1. Mild cognitive impairment  G31.84 331.83        OP SLP Assessment/Plan - 22 0900        SLP Assessment    Functional Problems Speech Language- Adult/Cognition  -HG    Impact on Function: Adult Speech Language/Cognition Restrictions in personal and social life; Difficulty sequencing thoughts to express complex messages; Difficulty following directions; Unrealistic view of abilities/deficits; Unable to complete specified job requirements; Other (comment0; Trouble learning or remembering new information; Poor attention to task   reported per wife from employer, modifications made -HG    Clinical Impression: Speech Language-Adult/Congnition Mild:; Cognitive Communication Impairment  -HG    Functional Problems Comment Pt feels that he has not digressed. Pt continues to work 4 days a week.  -HG    Clinical Impression Comments RBANS Total score of 84 is improved from previous re-assessment score of 73 placing pt in the Low Average range. 5 goals met and revised this date.  -HG    Please refer to paper survey for additional self-reported information Yes  -HG    Please refer to items scanned into chart for additional diagnostic informaiton and handouts as provided by clinician Yes  -HG    SLP Diagnosis mild Cognitive impairment   "-HG    Prognosis Excellent (comment)  -HG    Patient/caregiver participated in establishment of treatment plan and goals Yes  -HG    Patient would benefit from skilled therapy intervention Yes  -HG       SLP Plan    Frequency 1x/week  -HG    Duration 6-8 weeks  -HG    Planned CPT's? SLP INDIVIDUAL SPEECH THERAPY: [id number]  -HG    Expected Duration of Therapy Session (SLP Eval) 60  -HG    Plan Comments Cont with Cog tx.  -HG          User Key  (r) = Recorded By, (t) = Taken By, (c) = Cosigned By    Initials Name Provider Type    HG Candy Rubin MS CCC-SLP Speech and Language Pathologist                                   SLP OP Goals     Row Name 02/03/22 0900          Goal Type Needed    Goal Type Needed Memory; Attention/Orientation; Other Adult Goals  -HG            Subjective Comments    Subjective Comments Pt alert, cooperative, returned feeling like he \"hasn't digressed any.\"  -HG            Memory Goals    Patient and family will implement compensatory strategies to maximize patient’s Memory function so patient can continue to participate in daily activities 90%:; with intermittent cues  -HG     Status: Patient and family will implement compensatory strategies to maximize patient’s Memory function so patient can continue to participate in daily activities Progress slower than expected  -HG     Comments: Patient and family will implement compensatory strategies to maximize patient’s Memory function so patient can continue to participate in daily activities 12/16/21: Pt not using his journal and exhibited frequent repetition this date during the session. 11/4/21: Pt admits today that he can tell his memory is getting worse, he is having to think more.  -HG     Patient will demonstrate improved ability to recall information by immediately recalling a series of words 90%:; unrelated; after delay; with intermittent cues  -HG     Status: Patient will demonstrate improved ability to recall information by " immediately recalling a series of words Revised  -     Comments: Patient will demonstrate improved ability to recall information by immediately recalling a series of words 2/3/22: RBANS Immediate recall score of 81 is improved from a previous 73. 12/23/21: Immediate recall for 2 cards out of 10 and 12 and pt was 2/5. 12/16/21: SLUMS Immediate recall for paragraph info and pt was 4/8. 11/18/21: Immediate recall of picture scene without notes and pt was 10% accurate; with use of notes, pt was 40% accurate.  11/4/21: Word association for immediate recall and pt was 9/10, 8/10.  -HG     Patient’s memory skills will be enhanced as reported by patient by utilizing internal memory strategies to recall up to 3 pieces of information after a 5- minute delay 80%:; with intermittent cues  -HG     Status: Patient’s memory skills will be enhanced as reported by patient by utilizing internal memory strategies to recall up to 3 pieces of information after a 5- minute delay Revised  -     Comments: Patient’s memory skills will be enhanced as reported by patient by utilizing internal memory strategies to recall up to 3 pieces of information after a 5- minute delay 2/3/22: RBANS Delayed recall score of 79 improved from a previous 52. 12/16/21: SLUMS 5 object recall and pt was 3/5. Delayed recall of 4 related animals and p twas 3/4.   11/4/21: Delayed recall of 4 related animals and pt was 3/4, 4/4 x 3.  10/28/21: Delayed recall of 4 related animals and pt was 2/4, 3/4 x 2. Using a first letter initial and pt was 4/4.  -HG     Patient’s memory skills will be enhanced as reported by patient by using external memory aides 80%:; with cues  -HG     Status: Patient’s memory skills will be enhanced as reported by patient by using external memory aides Progressing as expected  -     Comments: Patient’s memory skills will be enhanced as reported by patient by using external memory aides 12/16/21: Pt did not return with journal and  "admitted to not writing in it. 11/18/21: Pt did write in his journal and education with wife over the phone stated that wife provided a lot of cues and prompting in order to get entries. Education regarding the need for specific details.  11/4/21: Pt stated, \"I did write in it.\" Pt doesn't think he wrote in it every day. Pt does know where the journal is at home. He does not know where the visual cue cards. 10/28/21: Provided a journal this date with example entry for current date and provided 5 cue cards to place in specific areas around his home and car to prompt pt to write in the journal.  -HG     Patient will demonstrate improved ability to recall information by stating activities patient has completed that day 80%:; with cues  -HG     Status: Patient will demonstrate improved ability to recall information by stating activities patient has completed that day Progressing as expected  -HG     Comments: Patient will demonstrate improved ability to recall information by stating activities patient has completed that day 12/23/21: Pt not aware of exact date and could not tell me his Roxy plans. 12/16/21: Pt oriented to date, year, location but repeated himself throughout the session. 10/28/21: Pt states that he recalls what he needs to recall.  -HG            Attention/Orientation Goals    Patient will be able to safely participate and attend to stimuli in structured settings Independently  -HG     Status: Patient will be able to safely participate and attend to stimuli in structured settings Progressing as expected  -HG     Comments: Patient will be able to safely participate and attend to stimuli in structured settings 2/3/22: RBANS Attention score of 94 is improved from a previous 91. 12/16/21: Pt exhibited impulsivity this date and admitted once cued.  -HG     Patient will improve attention skills by sustaining consistent behavioral response during continuous and repetitive activity (e.g., listening for " target words, auditory/reading comprehension tasks) with cues; 10 minute task  -HG     Status: Patient will improve attention skills by sustaining consistent behavioral response during continuous and repetitive activity (e.g., listening for target words, auditory/reading comprehension tasks) Progressing as expected  -HG     Comments: Patient will improve attention skills by sustaining consistent behavioral response during continuous and repetitive activity (e.g., listening for target words, auditory/reading comprehension tasks) 12/23/21: Sustained attention for word scramble and pt was 80% accurate. 10/28/21: Pt required re-directing x 5 during a 10 min task.  -HG     Patient will improve attention skills by sustaining focus in order to actively hold and manipulate information provided (e.g., sequencing auditorily presented number series in ascending or descending order) 90%:; with intermittent cues  -HG     Status: Patient will improve attention skills by sustaining focus in order to actively hold and manipulate information provided (e.g., sequencing auditorily presented number series in ascending or descending order) Revised  -HG     Comments: Patient will improve attention skills by sustaining focus in order to actively hold and manipulate information provided (e.g., sequencing auditorily presented number series in ascending or descending order) 2/3/22: RBANS Digit Span subtest, pt was 10/16 up to 6 digits accurate. 12/16/21: SLUMS Digit reversal and pt was 2/2 up to 4 digits.  -HG     Patient will improve attention skills by focusing to selective target/task when presented with competing stimuli or in a distracting environment in order to complete task 80%:; with cues  -HG     Status: Patient will improve attention skills by focusing to selective target/task when presented with competing stimuli or in a distracting environment in order to complete task Progressing as expected  -HG     Comments: Patient will  improve attention skills by focusing to selective target/task when presented with competing stimuli or in a distracting environment in order to complete task 11/4/21: Sorting deck by red and black cards and pt was 100% accurate.  -HG     Patient will improve attention skills by alternating or shifting focus between two different tasks in order to complete both tasks 90%:; with intermittent cues  -HG     Status: Patient will improve attention skills by alternating or shifting focus between two different tasks in order to complete both tasks Revised  -HG     Comments: Patient will improve attention skills by alternating or shifting focus between two different tasks in order to complete both tasks 2/3/22: RBANS Visuospatial task and pt was 16/20 compared to a previous 11/20. 12/16/21: Connecting the pictures and pt was 50% accurate, mod-severe impulsivity. 11/18/21: Card sorting by suits and then flipping over cards that have the letter N in them and pt was 80% accurate. 11/4/21: Alternating attention for word search for circling and putting a line throught it and pt was 70% accurate.  -HG     Patient will improve attention skills by sustaining focus to high-level cognitive tasks in order to complete task 80%:; with cues  -HG     Status: Patient will improve attention skills by sustaining focus to high-level cognitive tasks in order to complete task Progressing as expected  -HG     Comments: Patient will improve attention skills by sustaining focus to high-level cognitive tasks in order to complete task 3/11/21: Visuospatial for shapes and figures and pt was 70% accurate. 2/4/21: Clock drawing and setting clock to accurate time: pt struggled initially with making the hour and minute hand the same length and with further instruction and cues, pt was 12/3/20: One Pile Card Game: pt was 80% accurate with mod cues. Pt continues to be impulsive when playing Independently and was 70% accurate.  11/19/20: One Pile Card Game:  pt was impulsive and required mod cues and was 80% accurate.  10/15/20: One Pile Card Game: reviewed before play: pt was 80%accurate. 8/27/20: One Pile Card Game: Pt was 75% accurate.   -HG            Other Goals    Other Adult Goal- 1 Pt will improve RBANS Score to a 90 placing pt in the Average range.  -HG     Status: Other Adult Goal- 1 Progressing as expected; Revised  -HG     Comments: Other Adult Goal- 1 2/3/22: RBANS Total score of 84 meets previous targeted goal of 80 and places pt in the Low Average range. 12/16/21: SLUMS Score of 22 places pt in the Mild range.  -HG     Other Adult Goal- 2 Pt will participate in further reasoning and problem solving assessment with recs to follow as indicated.  -HG     Status: Other Adult Goal- 2 Progressing as expected  -HG     Comments: Other Adult Goal- 2 12/23/21: [place] word scramble and pt was 90% accurate. 12/16/21: Reasoning puzzle and pt was 50% accurate due to attention and impulsivity.  -HG            SLP Time Calculation    SLP Goal Re-Cert Due Date 03/05/22  -           User Key  (r) = Recorded By, (t) = Taken By, (c) = Cosigned By    Initials Name Provider Type    Candy Bello MS CCC-SLP Speech and Language Pathologist               OP SLP Education     Row Name 02/03/22 0900       Education    Education Comments Reviewed RBANS scores and compared to assesment given in October of 2021. Hmwk for delayed recall of shapes/figures.  -          User Key  (r) = Recorded By, (t) = Taken By, (c) = Cosigned By    Initials Name Effective Dates    Candy Bello MS CCC-SLP 06/16/21 -                      Time Calculation:   SLP Start Time: 0900  Untimed Charges  41071-ML Treatment/ST Modification Prosth Aug Alter : 75  Total Minutes  Untimed Charges Total Minutes: 75   Total Minutes: 75    Therapy Charges for Today     Code Description Service Date Service Provider Modifiers Qty    [id number] HC ST TREATMENT SPEECH 5 2/3/2022 Candy Rubin  MS CCC-SLP GN 1                   Candy Rubin, MS CCC-SLP  2/3/2022

## 2022-02-10 ENCOUNTER — HOSPITAL ENCOUNTER (OUTPATIENT)
Dept: SPEECH THERAPY | Facility: HOSPITAL | Age: 77
Setting detail: THERAPIES SERIES
Discharge: HOME | End: 2022-02-10

## 2022-02-10 DIAGNOSIS — G31.84 MILD COGNITIVE IMPAIRMENT: Primary | ICD-10-CM

## 2022-02-10 PROCEDURE — 92507 TX SP LANG VOICE COMM INDIV: CPT

## 2022-02-10 NOTE — THERAPY TREATMENT NOTE
Outpatient Speech Language Pathology   Adult Speech Language Cognitive Treatment Note  [facility]     Patient Name: Aj Marie  : 1945  MRN: [id number]  Today's Date: 2/10/2022         Visit Date: 02/10/2022   Patient Active Problem List   Diagnosis   • First degree atrioventricular block   • OA (osteoarthritis)   • Hearing loss   • Essential hypertension   • Candidate for statin therapy due to risk of future cardiovascular event   • Atypical chest pain   • Mild cognitive impairment   • Colon polyps   • IPMN (intraductal papillary mucinous neoplasm)   • Liver lesion   • NAFLD (nonalcoholic fatty liver disease)   • Urinary retention          Visit Dx:    ICD-10-CM ICD-9-CM   1. Mild cognitive impairment  G31.84 331.83        OP SLP Assessment/Plan - 02/10/22 0900        SLP Plan    Plan Comments Cont with Cog tx.  -HG          User Key  (r) = Recorded By, (t) = Taken By, (c) = Cosigned By    Initials Name Provider Type     Candy Rubin MS CCC-SLP Speech and Language Pathologist                                   SLP OP Goals     Row Name 02/10/22 0900          Goal Type Needed    Goal Type Needed Memory; Attention/Orientation; Other Adult Goals  -HG            Subjective Comments    Subjective Comments Pt alert, cooperative, returned with homework, aware of where he parked when probed.  -HG            Memory Goals    Patient and family will implement compensatory strategies to maximize patient’s Memory function so patient can continue to participate in daily activities 90%:; with intermittent cues  -HG     Status: Patient and family will implement compensatory strategies to maximize patient’s Memory function so patient can continue to participate in daily activities Progress slower than expected  -HG     Comments: Patient and family will implement compensatory strategies to maximize patient’s Memory function so patient can continue to participate in daily activities 21: Pt not using his  journal and exhibited frequent repetition this date during the session. 11/4/21: Pt admits today that he can tell his memory is getting worse, he is having to think more.  -HG     Patient will demonstrate improved ability to recall information by immediately recalling a series of words 90%:; unrelated; after delay; with intermittent cues  -HG     Status: Patient will demonstrate improved ability to recall information by immediately recalling a series of words Revised  -HG     Comments: Patient will demonstrate improved ability to recall information by immediately recalling a series of words 2/3/22: RBANS Immediate recall score of 81 is improved from a previous 73. 12/23/21: Immediate recall for 2 cards out of 10 and 12 and pt was 2/5. 12/16/21: SLUMS Immediate recall for paragraph info and pt was 4/8. 11/18/21: Immediate recall of picture scene without notes and pt was 10% accurate; with use of notes, pt was 40% accurate.  11/4/21: Word association for immediate recall and pt was 9/10, 8/10.  -HG     Patient’s memory skills will be enhanced as reported by patient by utilizing internal memory strategies to recall up to 3 pieces of information after a 5- minute delay 80%:; with intermittent cues  -HG     Status: Patient’s memory skills will be enhanced as reported by patient by utilizing internal memory strategies to recall up to 3 pieces of information after a 5- minute delay Progressing as expected  -HG     Comments: Patient’s memory skills will be enhanced as reported by patient by utilizing internal memory strategies to recall up to 3 pieces of information after a 5- minute delay 2/10/22: Delayed recall of shape/figure and pt was 60-70% accurate. 2/3/22: RBANS Delayed recall score of 79 improved from a previous 52. 12/16/21: SLUMS 5 object recall and pt was 3/5. Delayed recall of 4 related animals and p twas 3/4.   11/4/21: Delayed recall of 4 related animals and pt was 3/4, 4/4 x 3.  10/28/21: Delayed recall of 4  "related animals and pt was 2/4, 3/4 x 2. Using a first letter initial and pt was 4/4.  -HG     Patient’s memory skills will be enhanced as reported by patient by using external memory aides 80%:; with cues  -HG     Status: Patient’s memory skills will be enhanced as reported by patient by using external memory aides Progressing as expected  -HG     Comments: Patient’s memory skills will be enhanced as reported by patient by using external memory aides 12/16/21: Pt did not return with journal and admitted to not writing in it. 11/18/21: Pt did write in his journal and education with wife over the phone stated that wife provided a lot of cues and prompting in order to get entries. Education regarding the need for specific details.  11/4/21: Pt stated, \"I did write in it.\" Pt doesn't think he wrote in it every day. Pt does know where the journal is at home. He does not know where the visual cue cards. 10/28/21: Provided a journal this date with example entry for current date and provided 5 cue cards to place in specific areas around his home and car to prompt pt to write in the journal.  -HG     Patient will demonstrate improved ability to recall information by stating activities patient has completed that day 80%:; with cues  -HG     Status: Patient will demonstrate improved ability to recall information by stating activities patient has completed that day Progressing as expected  -HG     Comments: Patient will demonstrate improved ability to recall information by stating activities patient has completed that day 12/23/21: Pt not aware of exact date and could not tell me his [place] plans. 12/16/21: Pt oriented to date, year, location but repeated himself throughout the session. 10/28/21: Pt states that he recalls what he needs to recall.  -HG            Attention/Orientation Goals    Patient will be able to safely participate and attend to stimuli in structured settings Independently  -HG     Status: Patient will " be able to safely participate and attend to stimuli in structured settings Progressing as expected  -HG     Comments: Patient will be able to safely participate and attend to stimuli in structured settings 2/3/22: RBANS Attention score of 94 is improved from a previous 91. 12/16/21: Pt exhibited impulsivity this date and admitted once cued.  -HG     Patient will improve attention skills by sustaining consistent behavioral response during continuous and repetitive activity (e.g., listening for target words, auditory/reading comprehension tasks) with cues; 10 minute task  -HG     Status: Patient will improve attention skills by sustaining consistent behavioral response during continuous and repetitive activity (e.g., listening for target words, auditory/reading comprehension tasks) Progressing as expected  -HG     Comments: Patient will improve attention skills by sustaining consistent behavioral response during continuous and repetitive activity (e.g., listening for target words, auditory/reading comprehension tasks) 12/23/21: Sustained attention for word scramble and pt was 80% accurate. 10/28/21: Pt required re-directing x 5 during a 10 min task.  -HG     Patient will improve attention skills by sustaining focus in order to actively hold and manipulate information provided (e.g., sequencing auditorily presented number series in ascending or descending order) 90%:; with intermittent cues  -HG     Status: Patient will improve attention skills by sustaining focus in order to actively hold and manipulate information provided (e.g., sequencing auditorily presented number series in ascending or descending order) Revised  -HG     Comments: Patient will improve attention skills by sustaining focus in order to actively hold and manipulate information provided (e.g., sequencing auditorily presented number series in ascending or descending order) 2/3/22: RBANS Digit Span subtest, pt was 10/16 up to 6 digits accurate.  12/16/21: SLUMS Digit reversal and pt was 2/2 up to 4 digits.  -HG     Patient will improve attention skills by focusing to selective target/task when presented with competing stimuli or in a distracting environment in order to complete task 80%:; with cues  -HG     Status: Patient will improve attention skills by focusing to selective target/task when presented with competing stimuli or in a distracting environment in order to complete task Progressing as expected  -HG     Comments: Patient will improve attention skills by focusing to selective target/task when presented with competing stimuli or in a distracting environment in order to complete task 2/10/22: Card sorting by card colors and alternating dealing: pt was 75% accurate. 11/4/21: Sorting deck by red and black cards and pt was 100% accurate.  -HG     Patient will improve attention skills by alternating or shifting focus between two different tasks in order to complete both tasks 90%:; with intermittent cues  -HG     Status: Patient will improve attention skills by alternating or shifting focus between two different tasks in order to complete both tasks Revised  -HG     Comments: Patient will improve attention skills by alternating or shifting focus between two different tasks in order to complete both tasks 2/3/22: RBANS Visuospatial task and pt was 16/20 compared to a previous 11/20. 12/16/21: Connecting the pictures and pt was 50% accurate, mod-severe impulsivity. 11/18/21: Card sorting by suits and then flipping over cards that have the letter N in them and pt was 80% accurate. 11/4/21: Alternating attention for word search for circling and putting a line throught it and pt was 70% accurate.  -HG     Patient will improve attention skills by sustaining focus to high-level cognitive tasks in order to complete task 80%:; with cues  -HG     Status: Patient will improve attention skills by sustaining focus to high-level cognitive tasks in order to complete  task Progressing as expected  -HG     Comments: Patient will improve attention skills by sustaining focus to high-level cognitive tasks in order to complete task 2/10/22: Pt struggled with accuracy of shapes and figures and pt was 60-70% accurate. 3/11/21: Visuospatial for shapes and figures and pt was 70% accurate. 2/4/21: Clock drawing and setting clock to accurate time: pt struggled initially with making the hour and minute hand the same length and with further instruction and cues, pt was 12/3/20: One Pile Card Game: pt was 80% accurate with mod cues. Pt continues to be impulsive when playing Independently and was 70% accurate.  11/19/20: One Pile Card Game: pt was impulsive and required mod cues and was 80% accurate.  10/15/20: One Pile Card Game: reviewed before play: pt was 80%accurate. 8/27/20: One Pile Card Game: Pt was 75% accurate.  -HG            Other Goals    Other Adult Goal- 1 Pt will improve RBANS Score to a 90 placing pt in the Average range.  -HG     Status: Other Adult Goal- 1 Progressing as expected; Revised  -HG     Comments: Other Adult Goal- 1 2/3/22: RBANS Total score of 84 meets previous targeted goal of 80 and places pt in the Low Average range. 12/16/21: SLUMS Score of 22 places pt in the Mild range.  -HG     Other Adult Goal- 2 Pt will participate in further reasoning and problem solving assessment with recs to follow as indicated.  -HG     Status: Other Adult Goal- 2 Progressing as expected  -HG     Comments: Other Adult Goal- 2 12/23/21: Roxy word scramble and pt was 90% accurate. 12/16/21: Reasoning puzzle and pt was 50% accurate due to attention and impulsivity.  -HG            SLP Time Calculation    SLP Goal Re-Cert Due Date 03/05/22  -HG           User Key  (r) = Recorded By, (t) = Taken By, (c) = Cosigned By    Initials Name Provider Type    Candy Bello MS CCC-SLP Speech and Language Pathologist               OP SLP Education     Row Name 02/10/22 0900        Education    Education Comments Instructions for coding at home and for notepad/notes for recall of daily tasks.  -          User Key  (r) = Recorded By, (t) = Taken By, (c) = Cosigned By    Initials Name Effective Dates    HG Candy Rubin MS CCC-SLP 06/16/21 -                      Time Calculation:   SLP Start Time: 0900  Untimed Charges  23620-AH Treatment/ST Modification Prosth Aug Alter : 60  Total Minutes  Untimed Charges Total Minutes: 60   Total Minutes: 60    Therapy Charges for Today     Code Description Service Date Service Provider Modifiers Qty    [id number]  ST TREATMENT SPEECH 4 2/10/2022 Candy Rubin MS CCC-SLP GN 1                   Candy Rubin MS CCC-SLP  2/10/2022

## 2022-02-17 ENCOUNTER — HOSPITAL ENCOUNTER (OUTPATIENT)
Dept: SPEECH THERAPY | Facility: HOSPITAL | Age: 77
Setting detail: THERAPIES SERIES
Discharge: HOME | End: 2022-02-17

## 2022-02-17 DIAGNOSIS — G31.84 MILD COGNITIVE IMPAIRMENT: Primary | ICD-10-CM

## 2022-02-17 PROCEDURE — 92507 TX SP LANG VOICE COMM INDIV: CPT

## 2022-02-17 NOTE — THERAPY PROGRESS REPORT/RE-CERT
Outpatient Speech Language Pathology   Adult Speech Language Cognitive Progress Note  [facility]     Patient Name: Aj Marie  : 1945  MRN: [id number]  Today's Date: 2022         Visit Date: 2022   Patient Active Problem List   Diagnosis   • First degree atrioventricular block   • OA (osteoarthritis)   • Hearing loss   • Essential hypertension   • Candidate for statin therapy due to risk of future cardiovascular event   • Atypical chest pain   • Mild cognitive impairment   • Colon polyps   • IPMN (intraductal papillary mucinous neoplasm)   • Liver lesion   • NAFLD (nonalcoholic fatty liver disease)   • Urinary retention          Visit Dx:    ICD-10-CM ICD-9-CM   1. Mild cognitive impairment  G31.84 331.83        OP SLP Assessment/Plan - 22 1100        SLP Assessment    Functional Problems Comment Pt is no longer working, lost his job due to his memory.  -HG    Clinical Impression Comments SLUMS score of 22 remains in the Mild range. All goals remain as pt was only seen 3 times since insurance's last approval.  -HG    Please refer to paper survey for additional self-reported information Yes  -HG    Please refer to items scanned into chart for additional diagnostic informaiton and handouts as provided by clinician Yes  -HG       SLP Plan    Plan Comments Cont with Cog tx.  -HG          User Key  (r) = Recorded By, (t) = Taken By, (c) = Cosigned By    Initials Name Provider Type    Candy Bello MS CCC-SLP Speech and Language Pathologist                                   SLP OP Goals     Row Name 22 1100          Goal Type Needed    Goal Type Needed Memory; Attention/Orientation; Other Adult Goals  -HG            Subjective Comments    Subjective Comments Pt alert, cooperative, returned not aware that he missed his 9:00 appt.  -HG            Memory Goals    Patient and family will implement compensatory strategies to maximize patient’s Memory function so patient can  continue to participate in daily activities 90%:; with intermittent cues  -HG     Status: Patient and family will implement compensatory strategies to maximize patient’s Memory function so patient can continue to participate in daily activities Progress slower than expected  -HG     Comments: Patient and family will implement compensatory strategies to maximize patient’s Memory function so patient can continue to participate in daily activities 12/16/21: Pt not using his journal and exhibited frequent repetition this date during the session. 11/4/21: Pt admits today that he can tell his memory is getting worse, he is having to think more.  -HG     Patient will demonstrate improved ability to recall information by immediately recalling a series of words 90%:; unrelated; after delay; with intermittent cues  -HG     Status: Patient will demonstrate improved ability to recall information by immediately recalling a series of words Revised  -HG     Comments: Patient will demonstrate improved ability to recall information by immediately recalling a series of words 2/17/22: SLUMS Immediate recall of paragraph information and pt was 6/8.  2/3/22: RBANS Immediate recall score of 81 is improved from a previous 73. 12/23/21: Immediate recall for 2 cards out of 10 and 12 and pt was 2/5. 12/16/21: SLUMS Immediate recall for paragraph info and pt was 4/8. 11/18/21: Immediate recall of picture scene without notes and pt was 10% accurate; with use of notes, pt was 40% accurate.  11/4/21: Word association for immediate recall and pt was 9/10, 8/10.  -HG     Patient’s memory skills will be enhanced as reported by patient by utilizing internal memory strategies to recall up to 3 pieces of information after a 5- minute delay 80%:; with intermittent cues  -HG     Status: Patient’s memory skills will be enhanced as reported by patient by utilizing internal memory strategies to recall up to 3 pieces of information after a 5- minute delay  "Progressing as expected  -HG     Comments: Patient’s memory skills will be enhanced as reported by patient by utilizing internal memory strategies to recall up to 3 pieces of information after a 5- minute delay 2/17/22: SLUMS Delayed recall of 5 un-related words and pt was 1/5. 2/10/22: Delayed recall of shape/figure and pt was 60-70% accurate. 2/3/22: RBANS Delayed recall score of 79 improved from a previous 52. 12/16/21: SLUMS 5 object recall and pt was 3/5. Delayed recall of 4 related animals and p twas 3/4.   11/4/21: Delayed recall of 4 related animals and pt was 3/4, 4/4 x 3.  10/28/21: Delayed recall of 4 related animals and pt was 2/4, 3/4 x 2. Using a first letter initial and pt was 4/4.  -HG     Patient’s memory skills will be enhanced as reported by patient by using external memory aides 80%:; with cues  -HG     Status: Patient’s memory skills will be enhanced as reported by patient by using external memory aides Progressing as expected  -HG     Comments: Patient’s memory skills will be enhanced as reported by patient by using external memory aides 2/17/22: Education ongoing for purpose of journal use; exmples provided and a follow up phone call for implementation. 12/16/21: Pt did not return with journal and admitted to not writing in it. 11/18/21: Pt did write in his journal and education with wife over the phone stated that wife provided a lot of cues and prompting in order to get entries. Education regarding the need for specific details.  11/4/21: Pt stated, \"I did write in it.\" Pt doesn't think he wrote in it every day. Pt does know where the journal is at home. He does not know where the visual cue cards. 10/28/21: Provided a journal this date with example entry for current date and provided 5 cue cards to place in specific areas around his home and car to prompt pt to write in the journal.  -HG     Patient will demonstrate improved ability to recall information by stating activities patient has " completed that day 80%:; with cues  -HG     Status: Patient will demonstrate improved ability to recall information by stating activities patient has completed that day Progressing as expected  -HG     Comments: Patient will demonstrate improved ability to recall information by stating activities patient has completed that day 12/23/21: Pt not aware of exact date and could not tell me his [place] plans. 12/16/21: Pt oriented to date, year, location but repeated himself throughout the session. 10/28/21: Pt states that he recalls what he needs to recall.  -HG            Attention/Orientation Goals    Patient will be able to safely participate and attend to stimuli in structured settings Independently  -HG     Status: Patient will be able to safely participate and attend to stimuli in structured settings Progressing as expected  -HG     Comments: Patient will be able to safely participate and attend to stimuli in structured settings 2/17/22: SLUMS attention score for digit reversal was 1/2. 2/3/22: RBANS Attention score of 94 is improved from a previous 91. 12/16/21: Pt exhibited impulsivity this date and admitted once cued.  -HG     Patient will improve attention skills by sustaining consistent behavioral response during continuous and repetitive activity (e.g., listening for target words, auditory/reading comprehension tasks) with cues; 10 minute task  -HG     Status: Patient will improve attention skills by sustaining consistent behavioral response during continuous and repetitive activity (e.g., listening for target words, auditory/reading comprehension tasks) Progressing as expected  -HG     Comments: Patient will improve attention skills by sustaining consistent behavioral response during continuous and repetitive activity (e.g., listening for target words, auditory/reading comprehension tasks) 2/17/22: Sustained attention for digit span and pt was able to recall up to 6 with accuracy, 7 and 8 digits posed  difficulty. 12/23/21: Sustained attention for word scramble and pt was 80% accurate. 10/28/21: Pt required re-directing x 5 during a 10 min task.  -HG     Patient will improve attention skills by sustaining focus in order to actively hold and manipulate information provided (e.g., sequencing auditorily presented number series in ascending or descending order) 90%:; with intermittent cues  -HG     Status: Patient will improve attention skills by sustaining focus in order to actively hold and manipulate information provided (e.g., sequencing auditorily presented number series in ascending or descending order) Revised  -HG     Comments: Patient will improve attention skills by sustaining focus in order to actively hold and manipulate information provided (e.g., sequencing auditorily presented number series in ascending or descending order) 2/3/22: RBANS Digit Span subtest, pt was 10/16 up to 6 digits accurate. 12/16/21: SLUMS Digit reversal and pt was 2/2 up to 4 digits.  -HG     Patient will improve attention skills by focusing to selective target/task when presented with competing stimuli or in a distracting environment in order to complete task 80%:; with cues  -HG     Status: Patient will improve attention skills by focusing to selective target/task when presented with competing stimuli or in a distracting environment in order to complete task Progressing as expected  -HG     Comments: Patient will improve attention skills by focusing to selective target/task when presented with competing stimuli or in a distracting environment in order to complete task 2/10/22: Card sorting by card colors and alternating dealing: pt was 75% accurate. 11/4/21: Sorting deck by red and black cards and pt was 100% accurate.  -HG     Patient will improve attention skills by alternating or shifting focus between two different tasks in order to complete both tasks 90%:; with intermittent cues  -HG     Status: Patient will improve  attention skills by alternating or shifting focus between two different tasks in order to complete both tasks Revised  -HG     Comments: Patient will improve attention skills by alternating or shifting focus between two different tasks in order to complete both tasks 2/3/22: RBANS Visuospatial task and pt was 16/20 compared to a previous 11/20. 12/16/21: Connecting the pictures and pt was 50% accurate, mod-severe impulsivity. 11/18/21: Card sorting by suits and then flipping over cards that have the letter N in them and pt was 80% accurate. 11/4/21: Alternating attention for word search for circling and putting a line throught it and pt was 70% accurate.  -HG     Patient will improve attention skills by sustaining focus to high-level cognitive tasks in order to complete task 80%:; with cues  -HG     Status: Patient will improve attention skills by sustaining focus to high-level cognitive tasks in order to complete task Progressing as expected  -HG     Comments: Patient will improve attention skills by sustaining focus to high-level cognitive tasks in order to complete task 2/10/22: Pt struggled with accuracy of shapes and figures and pt was 60-70% accurate. 3/11/21: Visuospatial for shapes and figures and pt was 70% accurate. 2/4/21: Clock drawing and setting clock to accurate time: pt struggled initially with making the hour and minute hand the same length and with further instruction and cues, pt was 12/3/20: One Pile Card Game: pt was 80% accurate with mod cues. Pt continues to be impulsive when playing Independently and was 70% accurate.  11/19/20: One Pile Card Game: pt was impulsive and required mod cues and was 80% accurate.  10/15/20: One Pile Card Game: reviewed before play: pt was 80%accurate. 8/27/20: One Pile Card Game: Pt was 75% accurate.  -HG            Other Goals    Other Adult Goal- 1 Pt will improve RBANS Score to a 90 placing pt in the Average range.  -HG     Status: Other Adult Goal- 1  Progressing as expected; Revised  -     Comments: Other Adult Goal- 1 2/17/22: SLUMS score remained at 22 since December of 2022. 2/3/22: RBANS Total score of 84 meets previous targeted goal of 80 and places pt in the Low Average range. 12/16/21: SLUMS Score of 22 places pt in the Mild range.  -HG     Other Adult Goal- 2 Pt will participate in further reasoning and problem solving assessment with recs to follow as indicated.  -HG     Status: Other Adult Goal- 2 Progressing as expected  -HG     Comments: Other Adult Goal- 2 12/23/21: Roxy word scramble and pt was 90% accurate. 12/16/21: Reasoning puzzle and pt was 50% accurate due to attention and impulsivity.  -            SLP Time Calculation    SLP Goal Re-Cert Due Date 03/05/22  -           User Key  (r) = Recorded By, (t) = Taken By, (c) = Cosigned By    Initials Name Provider Type    Candy Bello MS CCC-SLP Speech and Language Pathologist               OP SLP Education     Row Name 02/17/22 1100       Education    Education Comments Education for journaling and instructions for use.  -          User Key  (r) = Recorded By, (t) = Taken By, (c) = Cosigned By    Initials Name Effective Dates    Candy Bello MS CCC-SLP 06/16/21 -                      Time Calculation:   SLP Start Time: 1100  Untimed Charges  76964-MD Treatment/ST Modification Prosth Aug Alter : 60  Total Minutes  Untimed Charges Total Minutes: 60   Total Minutes: 60    Therapy Charges for Today     Code Description Service Date Service Provider Modifiers Qty    [id number]  ST TREATMENT SPEECH 4 2/17/2022 Candy Rubin MS CCC-SLP GN 1                   Candy Rubin MS CCC-SLP  2/17/2022

## 2022-02-24 ENCOUNTER — APPOINTMENT (OUTPATIENT)
Dept: SPEECH THERAPY | Facility: HOSPITAL | Age: 77
End: 2022-02-24

## 2022-03-23 DIAGNOSIS — G30.9 MAJOR NEUROCOGNITIVE DISORDER DUE TO ALZHEIMER'S DISEASE, WITH BEHAVIORAL DISTURBANCE: ICD-10-CM

## 2022-03-23 DIAGNOSIS — F02.818 MAJOR NEUROCOGNITIVE DISORDER DUE TO ALZHEIMER'S DISEASE, WITH BEHAVIORAL DISTURBANCE: ICD-10-CM

## 2022-03-23 RX ORDER — DONEPEZIL HYDROCHLORIDE 10 MG/1
TABLET, FILM COATED ORAL
Qty: 90 TABLET | Refills: 0 | Status: SHIPPED | OUTPATIENT
Start: 2022-03-23 | End: 2022-04-08

## 2022-03-23 NOTE — TELEPHONE ENCOUNTER
Rx Refill Note  Requested Prescriptions     Pending Prescriptions Disp Refills   • donepezil (ARICEPT) 10 MG tablet [Pharmacy Med Name: DONEPEZIL HCL 10 MG TABLET] 90 tablet      Sig: TAKE ONE TABLET BY MOUTH ONCE NIGHTLY      Last office visit with prescribing clinician: 10/21/2021      Next office visit with prescribing clinician: 4/8/2022            Lin Joyce CMA  03/23/22, 07:50 EDT

## 2022-04-08 PROBLEM — F02.818 MAJOR NEUROCOGNITIVE DISORDER DUE TO ALZHEIMER'S DISEASE, WITH BEHAVIORAL DISTURBANCE (HCC): Status: ACTIVE | Noted: 2022-04-08

## 2022-04-08 PROBLEM — G30.9 MAJOR NEUROCOGNITIVE DISORDER DUE TO ALZHEIMER'S DISEASE, WITH BEHAVIORAL DISTURBANCE: Status: RESOLVED | Noted: 2022-04-08 | Resolved: 2022-04-08

## 2022-04-08 PROBLEM — G47.00 INSOMNIA: Status: ACTIVE | Noted: 2022-04-08

## 2022-04-08 PROBLEM — G31.84 MILD COGNITIVE IMPAIRMENT: Status: RESOLVED | Noted: 2020-07-17 | Resolved: 2022-04-08

## 2022-04-08 PROBLEM — F02.818 MAJOR NEUROCOGNITIVE DISORDER DUE TO ALZHEIMER'S DISEASE, WITH BEHAVIORAL DISTURBANCE: Status: RESOLVED | Noted: 2022-04-08 | Resolved: 2022-04-08

## 2022-04-08 PROBLEM — G30.9 MAJOR NEUROCOGNITIVE DISORDER DUE TO ALZHEIMER'S DISEASE, WITH BEHAVIORAL DISTURBANCE (HCC): Status: ACTIVE | Noted: 2022-04-08

## 2022-04-12 ENCOUNTER — DOCUMENTATION (OUTPATIENT)
Dept: SPEECH THERAPY | Facility: HOSPITAL | Age: 77
End: 2022-04-12

## 2022-04-12 DIAGNOSIS — G31.84 MILD COGNITIVE IMPAIRMENT: Primary | ICD-10-CM

## 2022-04-12 NOTE — THERAPY DISCHARGE NOTE
Speech Language Pathology Discharge Summary         Patient Name: Aj Marie  : 1945  MRN: [id number]    Today's Date: 2022       SLP OP Goals     Row Name 22 1400          Goal Type Needed    Goal Type Needed Memory;Attention/Orientation;Other Adult Goals  -HG            Subjective Comments    Subjective Comments Pt seen for evaluation and 8 treatment sessions.  -HG            Memory Goals    Patient and family will implement compensatory strategies to maximize patient’s Memory function so patient can continue to participate in daily activities 90%:;with intermittent cues  -HG     Status: Patient and family will implement compensatory strategies to maximize patient’s Memory function so patient can continue to participate in daily activities Progress slower than expected  -HG     Comments: Patient and family will implement compensatory strategies to maximize patient’s Memory function so patient can continue to participate in daily activities 21: Pt not using his journal and exhibited frequent repetition this date during the session. 21: Pt admits today that he can tell his memory is getting worse, he is having to think more.  -HG     Patient will demonstrate improved ability to recall information by immediately recalling a series of words 90%:;unrelated;after delay;with intermittent cues  -HG     Status: Patient will demonstrate improved ability to recall information by immediately recalling a series of words Revised  -HG     Comments: Patient will demonstrate improved ability to recall information by immediately recalling a series of words 22: SLUMS Immediate recall of paragraph information and pt was 6/8.  2/3/22: RBANS Immediate recall score of 81 is improved from a previous 73. 21: Immediate recall for 2 cards out of 10 and 12 and pt was 2/5. 21: SLUMS Immediate recall for paragraph info and pt was 4/8. 21: Immediate recall of picture scene without  notes and pt was 10% accurate; with use of notes, pt was 40% accurate.  11/4/21: Word association for immediate recall and pt was 9/10, 8/10.  -HG     Patient’s memory skills will be enhanced as reported by patient by utilizing internal memory strategies to recall up to 3 pieces of information after a 5- minute delay 80%:;with intermittent cues  -HG     Status: Patient’s memory skills will be enhanced as reported by patient by utilizing internal memory strategies to recall up to 3 pieces of information after a 5- minute delay Progressing as expected  -HG     Comments: Patient’s memory skills will be enhanced as reported by patient by utilizing internal memory strategies to recall up to 3 pieces of information after a 5- minute delay 2/17/22: SLUMS Delayed recall of 5 un-related words and pt was 1/5. 2/10/22: Delayed recall of shape/figure and pt was 60-70% accurate. 2/3/22: RBANS Delayed recall score of 79 improved from a previous 52. 12/16/21: SLUMS 5 object recall and pt was 3/5. Delayed recall of 4 related animals and p twas 3/4.   11/4/21: Delayed recall of 4 related animals and pt was 3/4, 4/4 x 3.  10/28/21: Delayed recall of 4 related animals and pt was 2/4, 3/4 x 2. Using a first letter initial and pt was 4/4.  -HG     Patient’s memory skills will be enhanced as reported by patient by using external memory aides 80%:;with cues  -HG     Status: Patient’s memory skills will be enhanced as reported by patient by using external memory aides Progressing as expected  -HG     Comments: Patient’s memory skills will be enhanced as reported by patient by using external memory aides 2/17/22: Education ongoing for purpose of journal use; exmples provided and a follow up phone call for implementation. 12/16/21: Pt did not return with journal and admitted to not writing in it. 11/18/21: Pt did write in his journal and education with wife over the phone stated that wife provided a lot of cues and prompting in order to get  "entries. Education regarding the need for specific details.  11/4/21: Pt stated, \"I did write in it.\" Pt doesn't think he wrote in it every day. Pt does know where the journal is at home. He does not know where the visual cue cards. 10/28/21: Provided a journal this date with example entry for current date and provided 5 cue cards to place in specific areas around his home and car to prompt pt to write in the journal.  -HG     Patient will demonstrate improved ability to recall information by stating activities patient has completed that day 80%:;with cues  -HG     Status: Patient will demonstrate improved ability to recall information by stating activities patient has completed that day Progressing as expected  -HG     Comments: Patient will demonstrate improved ability to recall information by stating activities patient has completed that day 12/23/21: Pt not aware of exact date and could not tell me his [place] plans. 12/16/21: Pt oriented to date, year, location but repeated himself throughout the session. 10/28/21: Pt states that he recalls what he needs to recall.  -HG            Attention/Orientation Goals    Patient will be able to safely participate and attend to stimuli in structured settings Independently  -HG     Status: Patient will be able to safely participate and attend to stimuli in structured settings Progressing as expected  -HG     Comments: Patient will be able to safely participate and attend to stimuli in structured settings 2/17/22: SLUMS attention score for digit reversal was 1/2. 2/3/22: RBANS Attention score of 94 is improved from a previous 91. 12/16/21: Pt exhibited impulsivity this date and admitted once cued.  -HG     Patient will improve attention skills by sustaining consistent behavioral response during continuous and repetitive activity (e.g., listening for target words, auditory/reading comprehension tasks) with cues;10 minute task  -HG     Status: Patient will improve attention " skills by sustaining consistent behavioral response during continuous and repetitive activity (e.g., listening for target words, auditory/reading comprehension tasks) Progressing as expected  -HG     Comments: Patient will improve attention skills by sustaining consistent behavioral response during continuous and repetitive activity (e.g., listening for target words, auditory/reading comprehension tasks) 2/17/22: Sustained attention for digit span and pt was able to recall up to 6 with accuracy, 7 and 8 digits posed difficulty. 12/23/21: Sustained attention for word scramble and pt was 80% accurate. 10/28/21: Pt required re-directing x 5 during a 10 min task.  -HG     Patient will improve attention skills by sustaining focus in order to actively hold and manipulate information provided (e.g., sequencing auditorily presented number series in ascending or descending order) 90%:;with intermittent cues  -HG     Status: Patient will improve attention skills by sustaining focus in order to actively hold and manipulate information provided (e.g., sequencing auditorily presented number series in ascending or descending order) Revised  -HG     Comments: Patient will improve attention skills by sustaining focus in order to actively hold and manipulate information provided (e.g., sequencing auditorily presented number series in ascending or descending order) 2/3/22: RBANS Digit Span subtest, pt was 10/16 up to 6 digits accurate. 12/16/21: SLUMS Digit reversal and pt was 2/2 up to 4 digits.  -HG     Patient will improve attention skills by focusing to selective target/task when presented with competing stimuli or in a distracting environment in order to complete task 80%:;with cues  -HG     Status: Patient will improve attention skills by focusing to selective target/task when presented with competing stimuli or in a distracting environment in order to complete task Progressing as expected  -HG     Comments: Patient will  improve attention skills by focusing to selective target/task when presented with competing stimuli or in a distracting environment in order to complete task 2/10/22: Card sorting by card colors and alternating dealing: pt was 75% accurate. 11/4/21: Sorting deck by red and black cards and pt was 100% accurate.  -HG     Patient will improve attention skills by alternating or shifting focus between two different tasks in order to complete both tasks 90%:;with intermittent cues  -HG     Status: Patient will improve attention skills by alternating or shifting focus between two different tasks in order to complete both tasks Revised  -HG     Comments: Patient will improve attention skills by alternating or shifting focus between two different tasks in order to complete both tasks 2/3/22: RBANS Visuospatial task and pt was 16/20 compared to a previous 11/20. 12/16/21: Connecting the pictures and pt was 50% accurate, mod-severe impulsivity. 11/18/21: Card sorting by suits and then flipping over cards that have the letter N in them and pt was 80% accurate. 11/4/21: Alternating attention for word search for circling and putting a line throught it and pt was 70% accurate.  -HG     Patient will improve attention skills by sustaining focus to high-level cognitive tasks in order to complete task 80%:;with cues  -HG     Status: Patient will improve attention skills by sustaining focus to high-level cognitive tasks in order to complete task Progressing as expected  -HG     Comments: Patient will improve attention skills by sustaining focus to high-level cognitive tasks in order to complete task 2/10/22: Pt struggled with accuracy of shapes and figures and pt was 60-70% accurate. 3/11/21: Visuospatial for shapes and figures and pt was 70% accurate. 2/4/21: Clock drawing and setting clock to accurate time: pt struggled initially with making the hour and minute hand the same length and with further instruction and cues, pt was  12/3/20: One Pile Card Game: pt was 80% accurate with mod cues. Pt continues to be impulsive when playing Independently and was 70% accurate.  11/19/20: One Pile Card Game: pt was impulsive and required mod cues and was 80% accurate.  10/15/20: One Pile Card Game: reviewed before play: pt was 80%accurate. 8/27/20: One Pile Card Game: Pt was 75% accurate.  -HG            Other Goals    Other Adult Goal- 1 Pt will improve RBANS Score to a 90 placing pt in the Average range.  -HG     Status: Other Adult Goal- 1 Progressing as expected;Revised  -     Comments: Other Adult Goal- 1 2/17/22: SLUMS score remained at 22 since December of 2022. 2/3/22: RBANS Total score of 84 meets previous targeted goal of 80 and places pt in the Low Average range. 12/16/21: SLUMS Score of 22 places pt in the Mild range.  -HG     Other Adult Goal- 2 Pt will participate in further reasoning and problem solving assessment with recs to follow as indicated.  -HG     Status: Other Adult Goal- 2 Progressing as expected  -HG     Comments: Other Adult Goal- 2 12/23/21: Roxy word scramble and pt was 90% accurate. 12/16/21: Reasoning puzzle and pt was 50% accurate due to attention and impulsivity.  -            SLP Time Calculation    SLP Goal Re-Cert Due Date 03/05/22  -           User Key  (r) = Recorded By, (t) = Taken By, (c) = Cosigned By    Initials Name Provider Type    Candy Bello MS CCC-SLP Speech and Language Pathologist                       Time Calculation:                    Candy Rubin MS CCC-SLP  4/12/2022

## 2022-04-29 ENCOUNTER — OFFICE VISIT (OUTPATIENT)
Dept: NEUROSURGERY | Facility: CLINIC | Age: 77
End: 2022-04-29

## 2022-04-29 VITALS — HEIGHT: 67 IN | WEIGHT: 190.4 LBS | TEMPERATURE: 98 F | BODY MASS INDEX: 29.88 KG/M2

## 2022-04-29 DIAGNOSIS — M51.36 DDD (DEGENERATIVE DISC DISEASE), LUMBAR: ICD-10-CM

## 2022-04-29 DIAGNOSIS — M47.819 FACET ARTHROPATHY: ICD-10-CM

## 2022-04-29 DIAGNOSIS — M48.061 SPINAL STENOSIS OF LUMBAR REGION WITHOUT NEUROGENIC CLAUDICATION: Primary | ICD-10-CM

## 2022-04-29 PROCEDURE — 99203 OFFICE O/P NEW LOW 30 MIN: CPT | Performed by: NEUROLOGICAL SURGERY

## 2022-04-29 NOTE — PROGRESS NOTES
Patient: Aj Marie  : 1945    Primary Care Provider: Asiya Gardner MD    Requesting Provider: As above        History    Chief Complaint: I have lower extremity weakness and diminished balance.    History of Present Illness: Mr. Marie is a 77-year-old gentleman that I saw in 2018 with complaints of low back and leg pain with sensory alteration.  At that time he was noted to have some moderate stenosis and more mechanical low back pain.  Surgery was not recommended.  He has no back or leg pain currently.  He has no numbness.  He has no limitation in his walking.  His wife reports that he does have some early dementia.    Review of Systems   Constitutional: Negative for activity change, appetite change, chills, diaphoresis, fatigue, fever and unexpected weight change.   HENT: Negative for congestion, dental problem, drooling, ear discharge, ear pain, facial swelling, hearing loss, mouth sores, nosebleeds, postnasal drip, rhinorrhea, sinus pressure, sinus pain, sneezing, sore throat, tinnitus, trouble swallowing and voice change.    Eyes: Negative for photophobia, pain, discharge, redness, itching and visual disturbance.   Respiratory: Negative for apnea, cough, choking, chest tightness, shortness of breath, wheezing and stridor.    Cardiovascular: Negative for chest pain, palpitations and leg swelling.   Gastrointestinal: Negative for abdominal distention, abdominal pain, anal bleeding, blood in stool, constipation, diarrhea, nausea, rectal pain and vomiting.   Endocrine: Negative for cold intolerance, heat intolerance, polydipsia, polyphagia and polyuria.   Genitourinary: Negative for decreased urine volume, difficulty urinating, dysuria, enuresis, flank pain, frequency, genital sores, hematuria, penile discharge, penile pain, penile swelling, scrotal swelling, testicular pain and urgency.   Musculoskeletal: Positive for back pain. Negative for arthralgias, gait problem, joint swelling,  "myalgias, neck pain and neck stiffness.   Skin: Negative for color change, pallor, rash and wound.   Allergic/Immunologic: Negative for environmental allergies, food allergies and immunocompromised state.   Neurological: Positive for weakness and numbness. Negative for dizziness, tremors, seizures, syncope, facial asymmetry, speech difficulty, light-headedness and headaches.   Hematological: Negative for adenopathy. Does not bruise/bleed easily.   Psychiatric/Behavioral: Positive for decreased concentration. Negative for agitation, behavioral problems, confusion, dysphoric mood, hallucinations, self-injury, sleep disturbance and suicidal ideas. The patient is not nervous/anxious and is not hyperactive.        The patient's past medical history, past surgical history, family history, and social history have been reviewed at length in the electronic medical record.    Physical Exam:   Temp 98 °F (36.7 °C) (Infrared)   Ht 170.2 cm (67\")   Wt 86.4 kg (190 lb 6.4 oz)   BMI 29.82 kg/m²   CONSTITUTIONAL: Patient is well-nourished, pleasant and appears stated age.  MUSCULOSKELETAL:  Straight leg raising is negative.  Ace's Sign is negative.  ROM in the low back is normal.  Tenderness in the back to palpation is not observed.  NEUROLOGICAL:  Orientation, memory, attention span, language function, and cognition have been examined and are intact.  Strength is intact in the lower extremities to direct testing.  Muscle tone is normal throughout.  Station and gait are normal.  Sensation is intact to light touch testing throughout.  Deep tendon reflexes are 2+ and symmetrical.  Coordination is intact.      Medical Decision Making    Data Review:   (All imaging studies were personally reviewed unless stated otherwise)  MRI of the lumbar spine dated 3/10/2022 is a limited quality study with no axial T2 images in the data set.  There is moderate stenosis at L3-4 and L4-5.  There is diffuse facet arthropathy and degenerative " disc disease.    Diagnosis:   The patient has extensive degenerative change in his back as well as lumbar stenosis without neurogenic claudication.    Treatment Options:   The patient does not require surgical intervention.  Apparently he has been referred to physical therapy which is quite reasonable.  He will follow-up with neurosurgery on an as-needed basis.       Diagnosis Plan   1. Spinal stenosis of lumbar region without neurogenic claudication     2. DDD (degenerative disc disease), lumbar     3. Facet arthropathy         Scribed for Spencer Ricketts MD by Yesenia Mendez CMA on 4/29/2022 15:37 EDT       I, Dr. Ricketts, personally performed the services described in the documentation, as scribed in my presence, and it is both accurate and complete.

## 2022-09-30 ENCOUNTER — PRIOR AUTHORIZATION (OUTPATIENT)
Dept: NEUROLOGY | Facility: CLINIC | Age: 77
End: 2022-09-30

## 2022-09-30 NOTE — TELEPHONE ENCOUNTER
PA FOR DONEPEZIL  (Key: [id number])  ACCORDING TO COVER MY MEDS:  The member recently filled this medication and will be able to return for their next refill according to their plan limits.

## 2022-10-10 ENCOUNTER — OFFICE VISIT (OUTPATIENT)
Dept: NEUROLOGY | Facility: CLINIC | Age: 77
End: 2022-10-10

## 2022-10-10 VITALS
HEIGHT: 63 IN | DIASTOLIC BLOOD PRESSURE: 90 MMHG | WEIGHT: 194 LBS | BODY MASS INDEX: 34.38 KG/M2 | HEART RATE: 57 BPM | OXYGEN SATURATION: 96 % | SYSTOLIC BLOOD PRESSURE: 140 MMHG

## 2022-10-10 DIAGNOSIS — G30.9 MAJOR NEUROCOGNITIVE DISORDER DUE TO ALZHEIMER'S DISEASE, WITH BEHAVIORAL DISTURBANCE: Primary | ICD-10-CM

## 2022-10-10 DIAGNOSIS — F02.818 MAJOR NEUROCOGNITIVE DISORDER DUE TO ALZHEIMER'S DISEASE, WITH BEHAVIORAL DISTURBANCE: Primary | ICD-10-CM
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
## 2022-10-10 DIAGNOSIS — Z74.09 IMPAIRED FUNCTIONAL MOBILITY, BALANCE, AND ENDURANCE: ICD-10-CM
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
## 2022-10-10 PROCEDURE — 99214 OFFICE O/P EST MOD 30 MIN: CPT | Performed by: NURSE PRACTITIONER

## 2022-10-10 RX ORDER — DONEPEZIL HYDROCHLORIDE 10 MG/1
10 TABLET, FILM COATED ORAL NIGHTLY
Qty: 90 TABLET | Refills: 3 | Status: SHIPPED | OUTPATIENT
Start: 2022-10-10

## 2022-10-10 RX ORDER — MEMANTINE HYDROCHLORIDE 10 MG/1
10 TABLET ORAL 2 TIMES DAILY
Qty: 180 TABLET | Refills: 3 | Status: SHIPPED | OUTPATIENT
Start: 2022-10-10 | End: 2022-10-17

## 2022-10-10 NOTE — PROGRESS NOTES
Subjective:     Patient ID: Aj Marie is a 77 y.o. male.    CC:   Chief Complaint   Patient presents with   • Dementia   • Gait Problem       HPI:   History of Present Illness   Today 10/10/2022-    This is a 77-year-old male who presents for 6-month follow-up on confirmed diagnosis of mild Alzheimer's disease. He has had symptoms since 2019. He resides at home with his wife. He is currently taking donepezil 10 mg daily, along with memantine 10 mg twice per day. At last visit, we started him on mirtazapine 15 mg 1 half to 1 pill at bedtime, to help with sleep, as well as his mood. He is very hard of hearing. He does continue to have significant hearing loss, and does wear hearing aids. It appears that he has not actually started the mirtazapine. He is accompanied by his wife today.    Today, he reports that he has been doing well. His wife states that he has been wearing his hearing aids. She reports that he has not been taking the mirtazapine. She states that he has been taking over-the-counter magnesium citrate 500 mg, which has been helpful. She reports that his mood and sleep has improved.     His wife reports that he had a fall a couple of weeks ago while getting out of the car, but he states he only tripped. His wife states he was able to get himself back up. He continues to have physical therapy, and his wife notes the physical therapist told her that his legs are still weak and require strengthening. She reports his balance has improved significantly with physical therapy, but he now works on strengthening with them. They met with Dr. Spencer Ricketts, neurosurgery, in 04/2022, who said there was no need for surgery in regards to his lumbar disc disease, but that physical therapy should be helpful. He goes to [facility], and his wife states they are happy with the care he receives as it is 1 on 1 with the physical therapist. She reports one of the physical therapists at [facility] has  "special training in working with patients with Alzheimer's, and took the time to explain how the muscles get weaker with this condition. His wife states she was encouraged to keep him active, as this helps with memory as well as strength. She confirms he has not experienced any serious injuries with his falls. His wife states she purchased him a cane, but he does not use this. She reports that he is not steady on his feet when he first wakes up. She states that he has a cane in the car that he uses for longer distances.    His wife reports that he has been taking lisinopril 12.5 mg for his blood pressure; 5 mg tablets, 2.5 tablets daily. She states that they have been having difficulty controlling his blood pressure, but it continues to be monitored. She states that his blood pressure has slightly improved, as it was almost in the 200s systolic previously. She reports that his dizziness has improved.     She states she is trying to encourage him to drink more water, and not just tea and coffee.     His wife confirms he has had his influenza vaccine. She reports that he is scheduled to get his 4th COVID-19 vaccine next week.    Prior neurological workup and history:  Memory changes noted and reviewed imaging previously with patient and wife on 2/24/2021.  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 3 year college education.     Of note the neurocognitive testing completed at the [facility] in summary showed findings consistent with a \"major neurocognitive disorder, mild severity due to early Alzheimer's disease with concomitant white matter changes of the brain along with mild behavioral disturbance\"-per Brittany Flores, PhD.   Recommendations were to manage vascular risk factors as well as discussed additional cognitive enhancers.    He has noted " symptoms since at least 2019 marked initially by forgetfulness  and word-finding difficulties . This has gradually worsened  over time. Additional symptoms have included impairments in short term memory . There have been associated  symptoms of anxiety  and agitation . He does have mild impairments in ADL's. His family manages his medications and finances. He only drives to close stores now. He is currently residing at home with his wife.      He is forgetful about if he or what he has eaten when she gets home from work. He does drive her to and from work and does not get lost driving locally. He has completed some cognitive therapy visits with speech therapy at [facility]-insurance would not approve additional visits since there was not significant improvement noted.     He is also having some low back issues and has been evaluated with [facility] 4/2022. No surgical intervention recommended.    Prior evaluation has included a normal MRI of the brain 2019 showing minimal generalized atrophy and 2 punctate chronic small vessel ischemic    The following portions of the patient's history were reviewed and updated as appropriate: allergies, current medications, past family history, past medical history, past social history, past surgical history and problem list.    Past Medical History:   Diagnosis Date   • Chest pain     Chest pain/dyspnea: Considering anginal equivalent and his risk factors, we will evaluate with a stress echocardiogram at his convenience and send a letter with the results and further recommendations.   • Difficulty walking 8/2022   • Dyslipidemia     November 2015:  Total cholesterol 186, triglycerides 147, HDL 37, .ASCVD 41.9%.   • First degree atrioventricular block     Chronicity unknown at this time, possibly first told 20 years ago.    • Hearing loss    • Hyperlipidemia    • Hypertension    • Major neurocognitive disorder due to Alzheimer's disease, with behavioral disturbance  ([facility]) 2022   • Memory loss    • OA (osteoarthritis)        Past Surgical History:   Procedure Laterality Date   • HERNIA REPAIR     • OTHER SURGICAL HISTORY      Remote skull surgery.   • POLYPECTOMY         Social History     Socioeconomic History   • Marital status:    Tobacco Use   • Smoking status: Former     Packs/day: 2.00     Years: 15.00     Pack years: 30.00     Types: Cigarettes     Quit date: 1996     Years since quittin.7   • Smokeless tobacco: Never   • Tobacco comments:     3/11/2016:He quit smoking 20 years ago and was smoking 1 to 2 packs per day for 15-20 years before that.    Vaping Use   • Vaping Use: Never used   Substance and Sexual Activity   • Alcohol use: No   • Drug use: No   • Sexual activity: Not Currently     Partners: Female       Family History   Problem Relation Age of Onset   • Heart disease Mother    • Emphysema Mother    • Dementia Father    • No Known Problems Sister    • No Known Problems Sister           Current Outpatient Medications:   •  aspirin 81 MG chewable tablet, Chew 81 mg Daily., Disp: , Rfl:   •  cholecalciferol (VITAMIN D3) 25 MCG (1000 UT) tablet, Take 1,000 Units by mouth Daily., Disp: , Rfl:   •  donepezil (ARICEPT) 10 MG tablet, Take 1 tablet by mouth Every Night., Disp: 90 tablet, Rfl: 3  •  lisinopril (PRINIVIL,ZESTRIL) 5 MG tablet, Take 2.5 tablets by mouth Daily., Disp: , Rfl:   •  MAGNESIUM CITRATE PO, Take 500 mg by mouth Every Night., Disp: , Rfl:   •  memantine (NAMENDA) 10 MG tablet, Take 1 tablet by mouth 2 (Two) Times a Day., Disp: 180 tablet, Rfl: 3  •  Multiple Vitamins-Minerals (MULTIVITAMIN ADULT PO), Take  by mouth Daily., Disp: , Rfl:   •  Omega-3 Fatty Acids (fish oil) 1000 MG capsule capsule, Take  by mouth Daily With Breakfast., Disp: , Rfl:   •  vitamin B-12 (CYANOCOBALAMIN) 500 MCG tablet, Take 500 mcg by mouth 2 (two) times a day., Disp: , Rfl:      Review of Systems   Constitutional: Negative for chills, fatigue, fever  "and unexpected weight change.   HENT: Negative for ear pain, hearing loss, nosebleeds, rhinorrhea and sore throat.    Eyes: Negative for photophobia, pain, discharge, itching and visual disturbance.   Respiratory: Negative for cough, chest tightness, shortness of breath and wheezing.    Cardiovascular: Negative for chest pain, palpitations and leg swelling.   Gastrointestinal: Negative for abdominal pain, blood in stool, constipation, diarrhea, nausea and vomiting.   Genitourinary: Negative for dysuria, frequency, hematuria and urgency.   Musculoskeletal: Positive for back pain and gait problem. Negative for arthralgias, joint swelling, myalgias, neck pain and neck stiffness.   Skin: Negative for rash and wound.   Allergic/Immunologic: Negative for environmental allergies and food allergies.   Neurological: Negative for dizziness, tremors, seizures, syncope, speech difficulty, weakness, light-headedness, numbness and headaches.   Hematological: Negative for adenopathy. Does not bruise/bleed easily.   Psychiatric/Behavioral: Positive for decreased concentration. Negative for agitation, confusion, hallucinations, sleep disturbance and suicidal ideas. The patient is not nervous/anxious.    All other systems reviewed and are negative.       Objective:  /90   Pulse 57   Ht 160 cm (63\")   Wt 88 kg (194 lb)   SpO2 96%   BMI 34.37 kg/m²     Neurologic Exam     Mental Status   Oriented to person, place, and time.   Registration: recalls 3 of 3 objects. Recall at 5 minutes: recalls 2 of 3 objects.   Speech: speech is normal   Level of consciousness: alert  Abnormal comprehension.     Cranial Nerves   Cranial nerves II through XII intact.     CN VIII   Hearing: impaired (very [place]-improved today with wearing bilateral hearing aids)    Motor Exam   Muscle bulk: normal  Overall muscle tone: normal    Strength   Strength 5/5 throughout.     Gait, Coordination, and Reflexes     Gait  Gait: (mild antalgia, slight " imbalance upon rising for 2-3 steps and then more steady, uses cane as needed, not today)    Coordination   Finger to nose coordination: normal    Tremor   Resting tremor: absent  Intention tremor: absent  Action tremor: absent    Reflexes   Reflexes 2+ except as noted.   Right : 2+  Left : 2+      Physical Exam  Constitutional:       Appearance: Normal appearance.   Neurological:      Mental Status: He is alert and oriented to person, place, and time.      Cranial Nerves: Cranial nerves 2-12 are intact.      Motor: Motor strength is normal.      Coordination: Finger-Nose-Finger Test normal.   Psychiatric:         Mood and Affect: Mood is anxious (Jovial today).         Speech: Speech normal.         Behavior: Behavior is slowed.         Thought Content: Thought content normal.         Cognition and Memory: He exhibits impaired recent memory.         Judgment: Judgment normal.     His MMSE today is a 27 out of 30, with 2 out of 3 for word recall.   Prior MMSE was 24 out of 30, with 2 out of 3 for word recall on 04/08/2022.     Assessment/Plan:       Diagnoses and all orders for this visit:    1. Major neurocognitive disorder due to Alzheimer's disease, with behavioral disturbance (HCC) (Primary)  -     donepezil (ARICEPT) 10 MG tablet; Take 1 tablet by mouth Every Night.  Dispense: 90 tablet; Refill: 3  -     memantine (NAMENDA) 10 MG tablet; Take 1 tablet by mouth 2 (Two) Times a Day.  Dispense: 180 tablet; Refill: 3    2. Impaired functional mobility, balance, and endurance  Comments:  continue with physical therapy [facility]    3. Fall at home, sequela  Comments:  continue with physical therapy [facility]         He is with his wife. He is doing well overall. He is wearing his hearing aids which has definitely improved, in regards to his hearing, some of his short-term memory, as well as his interactions. He really likes to be physically active and socializing, so I provided his wife with an additional  pathways packet with [facility] resources to see about additional daytime options for him. He will continue his current medications. We will follow up in 6 months for reevaluation, or sooner if needed. We strongly encouraged increasing water intake. Continue with physical therapy and falls prevention. Call with any additional questions or concerns prior to follow up in clinic.    Reviewed medications, potential side effects and signs and symptoms to report. Discussed risk versus benefits of treatment plan with patient and/or family-including medications, labs and radiology that may be ordered. Addressed questions and concerns during visit. Patient and/or family verbalized understanding and agree with plan.    AS THE PROVIDER, I PERSONALLY WORE PPE DURING ENTIRE FACE TO FACE ENCOUNTER IN CLINIC WITH THE PATIENT. PATIENT ALSO WORE PPE DURING ENTIRE FACE TO FACE ENCOUNTER EXCEPT FOR A MAX OF 30 SECONDS DURING NEUROLOGICAL EVALUATION OF CRANIAL NERVES AND THEN MASK WAS PLACED BACK OVER PATIENT FACE FOR REMAINDER OF VISIT. I WASHED MY HANDS BEFORE AND AFTER VISIT.    During this visit the following were done:  Labs Reviewed []    Labs Ordered []    Radiology Reports Reviewed []    Radiology Ordered []    PCP Records Reviewed []    Referring Provider Records Reviewed []    ER Records Reviewed []    Hospital Records Reviewed []    History Obtained From Family [x]  wife  Radiology Images Reviewed []    Other Reviewed []    Records Requested []      Transcribed from ambient dictation for TAMERA Hooper by Serena Trivedi.  10/10/22   18:07 EDT    Patient or patient representative verbalized consent to the visit recording.  I have personally performed the services described in this document as transcribed by the above individual, and it is both accurate and complete.  TAMERA Hooper  10/12/2022  07:25 EDT

## 2022-10-17 DIAGNOSIS — F02.818 MAJOR NEUROCOGNITIVE DISORDER DUE TO ALZHEIMER'S DISEASE, WITH BEHAVIORAL DISTURBANCE: ICD-10-CM

## 2022-10-17 DIAGNOSIS — G30.9 MAJOR NEUROCOGNITIVE DISORDER DUE TO ALZHEIMER'S DISEASE, WITH BEHAVIORAL DISTURBANCE: ICD-10-CM

## 2022-10-17 RX ORDER — MEMANTINE HYDROCHLORIDE 10 MG/1
10 TABLET ORAL 2 TIMES DAILY
Qty: 180 TABLET | Refills: 3 | Status: SHIPPED | OUTPATIENT
Start: 2022-10-17

## 2022-10-17 NOTE — TELEPHONE ENCOUNTER
Rx Refill Note  Requested Prescriptions     Pending Prescriptions Disp Refills   • memantine (NAMENDA) 10 MG tablet [Pharmacy Med Name: MEMANTINE HCL 10 MG TABLET] 180 tablet 3     Sig: TAKE 1/2 TABLET BY MOUTH DAILY FOR 14 DAYS, THEN TAKE 1/2 TABLET BY MOUTH TWICE A DAY FOR 14 DAYS, THEN TAKE ONE TABLET BY MOUTH EVERY MORNING AND TAKE 1/2 TABLET BY MOUTH EVERY EVENING FOR 14 DAYS, THEN TAKE ONE TABLET TWICE A DAY AND CONTINUE      Last office visit with prescribing clinician: 10/10/2022      Next office visit with prescribing clinician: 4/14/2023            Lin Joyce CMA  10/17/22, 08:23 EDT

## 2022-10-17 NOTE — TELEPHONE ENCOUNTER
I filled this on 10/10/2022. I just approved again with updated instructions. I cannot figure out why pharmacies are not getting my scripts during office visits. Hopefully this time it goes through. Thanks, Gisell

## 2023-05-12 ENCOUNTER — OFFICE VISIT (OUTPATIENT)
Dept: NEUROLOGY | Facility: CLINIC | Age: 78
End: 2023-05-12
Payer: MEDICARE

## 2023-05-12 VITALS
SYSTOLIC BLOOD PRESSURE: 122 MMHG | HEART RATE: 58 BPM | WEIGHT: 190 LBS | DIASTOLIC BLOOD PRESSURE: 66 MMHG | HEIGHT: 65 IN | BODY MASS INDEX: 31.65 KG/M2 | OXYGEN SATURATION: 96 %

## 2023-05-12 DIAGNOSIS — G30.9 MAJOR NEUROCOGNITIVE DISORDER DUE TO ALZHEIMER'S DISEASE, WITH BEHAVIORAL DISTURBANCE: Primary | ICD-10-CM

## 2023-05-12 DIAGNOSIS — F02.818 MAJOR NEUROCOGNITIVE DISORDER DUE TO ALZHEIMER'S DISEASE, WITH BEHAVIORAL DISTURBANCE: Primary | ICD-10-CM

## 2023-05-12 DIAGNOSIS — G56.03 BILATERAL CARPAL TUNNEL SYNDROME: ICD-10-CM

## 2023-05-12 RX ORDER — TAMSULOSIN HYDROCHLORIDE 0.4 MG/1
0.4 CAPSULE ORAL DAILY
Qty: 30 CAPSULE | Refills: 0 | COMMUNITY
Start: 2023-05-01 | End: 2023-05-31

## 2023-05-12 RX ORDER — DONEPEZIL HYDROCHLORIDE 10 MG/1
10 TABLET, FILM COATED ORAL NIGHTLY
Qty: 90 TABLET | Refills: 3 | Status: SHIPPED | OUTPATIENT
Start: 2023-05-12

## 2023-05-12 RX ORDER — FINASTERIDE 5 MG/1
TABLET, FILM COATED ORAL
COMMUNITY
Start: 2023-05-05

## 2023-05-12 RX ORDER — MEMANTINE HYDROCHLORIDE 10 MG/1
10 TABLET ORAL 2 TIMES DAILY
Qty: 180 TABLET | Refills: 3 | Status: SHIPPED | OUTPATIENT
Start: 2023-05-12

## 2023-05-12 NOTE — LETTER
"May 12, 2023     Calvin Coley MD  [address]  [address]  [facility] [address]    Patient: Aj Marie   YOB: 1945   Date of Visit: 5/12/2023       Dear Calvin Coley MD    Aj Marie was in my office today. Below is a copy of my note.    If you have questions, please do not hesitate to call me. I look forward to following Aj along with you.         Sincerely,        TAMERA Hooper        CC: MD Nathaniel Moser Jr., MD    Subjective:     Patient ID: Aj Marie is a 78 y.o. male.    CC:   Chief Complaint   Patient presents with   • Alzheimer's Disease       HPI:   History of Present Illness   Today 5/12/2023-  This is an 78-year-old male who presents for 6-month neurology follow-up on confirmed Alzheimer's disease with symptoms present since 2019. He was last seen in clinic on 10/10/2022. He has been on donepezil 10 mg daily along with memantine 10 mg twice per day. He had previously been prescribed mirtazapine but this was discontinued. He is accompanied by his wife during today's visit. He is here for follow-up and reevaluation of symptoms. He has also of note been following with Dr. Nathaniel Rodriguez for notes of urinary retention.     Today, he reports his memory is \"adequate.\" His wife states his memory has \"plateaued\". His wife confirms he is having agitation. She reports she has been giving him a magnesium supplement when he gets agitated and has found this helpful. She states she did not want to put him on any more prescription medication right now. She confirms his agitation is manageable with redirection. She states when his agitation does occur, she knows it is not him because he is \"totally different.\" His wife reports she has been reading about Alzheimer's disease and triggers of frustration and agitation. She confirms the information she was previously given was helpful.     His wife states he has more weakness in his " "bilateral legs and loss of feeling in his bilateral hands. She states he has difficulty tying his shoes. His wife believes he has neuropathy, although he does not have a history of this. He is not diabetic. He reports he has tingling in his hands. He states he occasionally has a \"little bit of numbness\" in the tip of his fingers when he is trying to open things. His wife states he does not have  strength in his hands. He is right-hand dominant.    He has completed physical therapy. His wife states physical therapy was very helpful for his balance. He was given exercises to do at home. His wife states he also gets on their stationary bike 3 times a week. His wife notes he sits a lot during the day. She states he reads mystery books, has puzzle books that he works on, and plays on his phone. His wife states they are trying to stay active. His wife is going to retire soon, so they should become even more active.     His wife reports he has an appointment with Dr. Pipo Enriquez, urology, in 2 weeks for his urinary retention. She states he was prescribed Flomax and his symptoms have improved. She reports he did not urinate for almost 2 days, so she took him to the emergency department, and he had a Navarro catheter placed for 1 week. His wife states the catheter \"kept coming undone,\" so he had a larger one placed. She adds he was very good at emptying the bag of his catheter. His wife states he had a CT scan completed and was found to have an enlarged prostate.     He denies any falls in the past 12 months. He denies using a cane or walker. His wife states she would love for him to use a cane, but he will not. She reports she keeps a cane in the car and he will use it if they have a long-distance walk.     His wife reports she got him new hearing aids because the other ones were too \"ubaldo.\"     His wife states he recently lost his wallet 3 weeks ago. She notes this is unusual for him as he has never lost anything. " "She confirms he had a UTI during the time and has been treated with antibiotics.    His wife reports they are planning on downsizing and are trying to get into a senior home, but they are all too full currently. He states he is looking forward to using the pool. His wife reports he drives locally, but he does not drive out of town by himself.     He does have poor short term memory and she reports he forgets his diagnosis often.     Prior neurological workup and history:  Memory changes noted and reviewed imaging previously with patient and wife on 2/24/2021.  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 3 year college education.     Of note the neurocognitive testing completed at the [facility] 6/30/2021 in summary showed findings consistent with a \"major neurocognitive disorder, mild severity due to early Alzheimer's disease with concomitant white matter changes of the brain along with mild behavioral disturbance\"-per Brittany Flores, PhD.   Recommendations were to manage vascular risk factors as well as discussed additional cognitive enhancers.     He has noted symptoms since at least 2019 marked initially by forgetfulness  and word-finding difficulties . This has gradually worsened  over time. Additional symptoms have included impairments in short term memory . There have been associated  symptoms of anxiety  and agitation . He does have mild impairments in ADL's. His family manages his medications and finances. He only drives to close stores now. He is currently residing at home with his wife. 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     He is forgetful about if he or what he has eaten when she gets home from work. He does drive her to and from work and does not get lost driving " locally. He has completed some cognitive therapy visits with speech therapy at [facility]-insurance would not approve additional visits since there was not significant improvement noted.     He is also having some low back issues and has been evaluated with [facility] 2022. No surgical intervention recommended.      Prior evaluation has included a normal MRI of the brain  showing minimal generalized atrophy and 2 punctate chronic small vessel ischemic     The following portions of the patient's history were reviewed and updated as appropriate: allergies, current medications, past family history, past medical history, past social history, past surgical history and problem list.    Past Medical History:   Diagnosis Date   • Chest pain     Chest pain/dyspnea: Considering anginal equivalent and his risk factors, we will evaluate with a stress echocardiogram at his convenience and send a letter with the results and further recommendations.   • Difficulty walking 2022   • Dyslipidemia     2015:  Total cholesterol 186, triglycerides 147, HDL 37, .ASCVD 41.9%.   • First degree atrioventricular block     Chronicity unknown at this time, possibly first told 20 years ago.    • Hearing loss    • Hyperlipidemia    • Hypertension    • Major neurocognitive disorder due to Alzheimer's disease, with behavioral disturbance 2022   • Memory loss    • OA (osteoarthritis)        Past Surgical History:   Procedure Laterality Date   • HERNIA REPAIR     • OTHER SURGICAL HISTORY      Remote skull surgery.   • POLYPECTOMY         Social History     Socioeconomic History   • Marital status:    Tobacco Use   • Smoking status: Former     Packs/day: 2.00     Years: 15.00     Pack years: 30.00     Types: Cigarettes     Quit date: 1996     Years since quittin.3   • Smokeless tobacco: Never   • Tobacco comments:     3/11/2016:He quit smoking 20 years ago and was smoking 1 to 2 packs per day for  15-20 years before that.    Vaping Use   • Vaping Use: Never used   Substance and Sexual Activity   • Alcohol use: No   • Drug use: No   • Sexual activity: Not Currently     Partners: Female       Family History   Problem Relation Age of Onset   • Heart disease Mother    • Emphysema Mother    • Dementia Father    • No Known Problems Sister    • No Known Problems Sister           Current Outpatient Medications:   •  aspirin 81 MG chewable tablet, Chew 1 tablet Daily., Disp: , Rfl:   •  cholecalciferol (VITAMIN D3) 25 MCG (1000 UT) tablet, Take 1 tablet by mouth Daily., Disp: , Rfl:   •  donepezil (ARICEPT) 10 MG tablet, Take 1 tablet by mouth Every Night., Disp: 90 tablet, Rfl: 3  •  finasteride (PROSCAR) 5 MG tablet, , Disp: , Rfl:   •  lisinopril (PRINIVIL,ZESTRIL) 5 MG tablet, Take 2.5 tablets by mouth Daily., Disp: , Rfl:   •  MAGNESIUM CITRATE PO, Take 500 mg by mouth Every Night., Disp: , Rfl:   •  memantine (NAMENDA) 10 MG tablet, Take 1 tablet by mouth 2 (Two) Times a Day., Disp: 180 tablet, Rfl: 3  •  Multiple Vitamins-Minerals (MULTIVITAMIN ADULT PO), Take  by mouth Daily., Disp: , Rfl:   •  Omega-3 Fatty Acids (fish oil) 1000 MG capsule capsule, Take  by mouth Daily With Breakfast., Disp: , Rfl:   •  tamsulosin (FLOMAX) 0.4 MG capsule 24 hr capsule, Take 1 capsule by mouth Daily., Disp: 30 capsule, Rfl: 0  •  vitamin B-12 (CYANOCOBALAMIN) 500 MCG tablet, Take 1 tablet by mouth 2 (two) times a day., Disp: , Rfl:      Review of Systems   Constitutional: Negative for chills, fatigue, fever and unexpected weight change.   HENT: Negative for ear pain, hearing loss, nosebleeds, rhinorrhea and sore throat.    Eyes: Negative for photophobia, pain, discharge, itching and visual disturbance.   Respiratory: Negative for cough, chest tightness, shortness of breath and wheezing.    Cardiovascular: Negative for chest pain, palpitations and leg swelling.   Gastrointestinal: Negative for abdominal pain, blood in stool,  "constipation, diarrhea, nausea and vomiting.   Genitourinary: Negative for dysuria, frequency, hematuria and urgency.   Musculoskeletal: Negative for arthralgias, back pain, gait problem, joint swelling, myalgias, neck pain and neck stiffness.   Skin: Negative for rash and wound.   Allergic/Immunologic: Negative for environmental allergies and food allergies.   Neurological: Negative for dizziness, tremors, seizures, syncope, speech difficulty, weakness, light-headedness, numbness and headaches.   Hematological: Negative for adenopathy. Does not bruise/bleed easily.   Psychiatric/Behavioral: Negative for agitation, confusion, decreased concentration, hallucinations, sleep disturbance and suicidal ideas. The patient is not nervous/anxious.    All other systems reviewed and are negative.       Objective:  /66   Pulse 58   Ht 165.1 cm (65\")   Wt 86.2 kg (190 lb)   SpO2 96%   BMI 31.62 kg/m²     Neurologic Exam     Mental Status   Oriented to person, place, and time.   Registration: recalls 3 of 3 objects. Recall at 5 minutes: recalls 1 of 3 objects.   Attention: decreased. Concentration: decreased.   Speech: speech is normal   Level of consciousness: alert  Abnormal comprehension.     Cranial Nerves     CN II   Visual fields full to confrontation.     CN III, IV, VI   Pupils are equal, round, and reactive to light.  Extraocular motions are normal.     CN V   Facial sensation intact.     CN VII   Facial expression full, symmetric.     CN VIII   Hearing: impaired (very [place], not wearing hearing aids today)    CN IX, X   CN IX normal.   CN X normal.     CN XI   CN XI normal.     CN XII   CN XII normal.     Motor Exam   Muscle bulk: normal  Overall muscle tone: normal    Strength   Strength 5/5 throughout.     Sensory Exam     + Tinel's and + Phalen's bilateral wrists R>L     Gait, Coordination, and Reflexes     Gait  Gait: (mild antalgia, some sway, no assistive device today)    Coordination   Finger to nose " coordination: normal    Tremor   Resting tremor: absent  Intention tremor: absent    Reflexes   Right brachioradialis: 2+  Left brachioradialis: 2+  Right biceps: 2+  Left biceps: 2+  Right : 2+  Left : 2+  Right Miller: absent  Left Miller: absent      Physical Exam  Constitutional:       Appearance: Normal appearance.   Eyes:      Extraocular Movements: EOM normal.      Pupils: Pupils are equal, round, and reactive to light.   Musculoskeletal:      Lumbar back: Decreased range of motion.      Comments:   Has chronic low back pain, worsens with longer distance ambulation, unchanged   Neurological:      Mental Status: He is alert and oriented to person, place, and time.      Motor: Motor strength is normal.      Coordination: Finger-Nose-Finger Test normal.      Deep Tendon Reflexes:      Reflex Scores:       Bicep reflexes are 2+ on the right side and 2+ on the left side.       Brachioradialis reflexes are 2+ on the right side and 2+ on the left side.  Psychiatric:         Mood and Affect: Mood is anxious. Inappropriate: at times, he does joke often, smiles and is cooperative          Speech: Speech normal.         Behavior: Behavior is slowed.         Thought Content: Thought content normal.         Cognition and Memory: He exhibits impaired recent memory.         Judgment: Judgment is inappropriate.        His MMSE last visit was a 27 out of 30, 2 out of 3 for word recall.  Prior to that, he scored a 24 out of 30 with 2 out of 3 for word recall.    MMSE today is a 24 out of 30, 1 out of 3 for word recall.    Assessment/Plan:      Diagnoses and all orders for this visit:    1. 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  Dispense: 90 tablet; Refill: 3  -     memantine (NAMENDA) 10 MG tablet; Take 1 tablet by mouth 2 (Two) Times a Day.  Dispense: 180 tablet; Refill: 3    2.  Bilateral carpal tunnel syndrome  -      Wrist Hand Orthosis, Wrist Extension Control Cock-up        He is with his wife today. He has such a fun personality. He is in a great mood. He occasionally has some agitation, but she is giving him a magnesium supplement to help with calming him. This seems to work well. She has also been reading a book on Alzheimer's. I have provided them with information on living with Alzheimer's for him to read as well as additional Alzheimer's caregiver guide. We will continue his current medications. He will continue with all specialists. They will follow up in 6 months or sooner if needed for further evaluation of symptoms. He is only driving locally. Otherwise, he is with his wife at all times. He did recently lose his wallet, but that was related to a UTI and urinary retention. He is doing better and following up with urology soon. They will call us with any questions or concerns prior to follow up in clinic. He does have some symptoms of carpal tunnel on exam, and I have ordered wrist splints for him to wear at bedtime. They will call us with any other questions prior to follow-up.    Total time of visit today was 27 minutes including reviewing ER and PCP notes, obtaining history from the patient and wife, completing exam, discussing findings and recommendations in detail.    Reviewed medications, potential side effects and signs and symptoms to report. Discussed risk versus benefits of treatment plan with patient and/or family-including medications, labs and radiology that may be ordered. Addressed questions and concerns during visit. Patient and/or family verbalized understanding and agree with plan.    During this visit the following were done:  Labs Reviewed [x]    Labs Ordered []    Radiology Reports Reviewed []    Radiology Ordered []    PCP Records Reviewed []    Referring Provider Records Reviewed []    ER Records Reviewed [x]    Hospital Records Reviewed []    History  Obtained From Family [x] wife   Radiology Images Reviewed []    Other Reviewed []    Records Requested []      Transcribed from ambient dictation for TAMERA Hooper by Serena Trivedi.  05/12/23   18:08 EDT    Patient or patient representative verbalized consent to the visit recording.  I have personally performed the services described in this document as transcribed by the above individual, and it is both accurate and complete.  TAMERA Hooper  5/12/2023  19:57 EDT

## 2023-05-12 NOTE — PROGRESS NOTES
"Subjective:     Patient ID: Aj Marie is a 78 y.o. male.    CC:   Chief Complaint   Patient presents with   • Alzheimer's Disease       HPI:   History of Present Illness   Today 5/12/2023-  This is an 78-year-old male who presents for 6-month neurology follow-up on confirmed Alzheimer's disease with symptoms present since 2019. He was last seen in clinic on 10/10/2022. He has been on donepezil 10 mg daily along with memantine 10 mg twice per day. He had previously been prescribed mirtazapine but this was discontinued. He is accompanied by his wife during today's visit. He is here for follow-up and reevaluation of symptoms. He has also of note been following with Dr. Nathaniel Rodriguez for notes of urinary retention.     Today, he reports his memory is \"adequate.\" His wife states his memory has \"plateaued\". His wife confirms he is having agitation. She reports she has been giving him a magnesium supplement when he gets agitated and has found this helpful. She states she did not want to put him on any more prescription medication right now. She confirms his agitation is manageable with redirection. She states when his agitation does occur, she knows it is not him because he is \"totally different.\" His wife reports she has been reading about Alzheimer's disease and triggers of frustration and agitation. She confirms the information she was previously given was helpful.     His wife states he has more weakness in his bilateral legs and loss of feeling in his bilateral hands. She states he has difficulty tying his shoes. His wife believes he has neuropathy, although he does not have a history of this. He is not diabetic. He reports he has tingling in his hands. He states he occasionally has a \"little bit of numbness\" in the tip of his fingers when he is trying to open things. His wife states he does not have  strength in his hands. He is right-hand dominant.    He has completed physical therapy. His wife states " "physical therapy was very helpful for his balance. He was given exercises to do at home. His wife states he also gets on their stationary bike 3 times a week. His wife notes he sits a lot during the day. She states he reads mystery books, has puzzle books that he works on, and plays on his phone. His wife states they are trying to stay active. His wife is going to retire soon, so they should become even more active.     His wife reports he has an appointment with Dr. Pipo Enriquez, urology, in 2 weeks for his urinary retention. She states he was prescribed Flomax and his symptoms have improved. She reports he did not urinate for almost 2 days, so she took him to the emergency department, and he had a Navarro catheter placed for 1 week. His wife states the catheter \"kept coming undone,\" so he had a larger one placed. She adds he was very good at emptying the bag of his catheter. His wife states he had a CT scan completed and was found to have an enlarged prostate.     He denies any falls in the past 12 months. He denies using a cane or walker. His wife states she would love for him to use a cane, but he will not. She reports she keeps a cane in the car and he will use it if they have a long-distance walk.     His wife reports she got him new hearing aids because the other ones were too \"ubaldo.\"     His wife states he recently lost his wallet 3 weeks ago. She notes this is unusual for him as he has never lost anything. She confirms he had a UTI during the time and has been treated with antibiotics.    His wife reports they are planning on downsizing and are trying to get into a senior home, but they are all too full currently. He states he is looking forward to using the pool. His wife reports he drives locally, but he does not drive out of town by himself.     He does have poor short term memory and she reports he forgets his diagnosis often.     Prior neurological workup and history:  Memory changes noted and " "reviewed imaging previously with patient and wife on 2/24/2021.  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 3 year college education.     Of note the neurocognitive testing completed at the [facility] 6/30/2021 in summary showed findings consistent with a \"major neurocognitive disorder, mild severity due to early Alzheimer's disease with concomitant white matter changes of the brain along with mild behavioral disturbance\"-per Brittany Flores, PhD.   Recommendations were to manage vascular risk factors as well as discussed additional cognitive enhancers.     He has noted symptoms since at least 2019 marked initially by forgetfulness  and word-finding difficulties . This has gradually worsened  over time. Additional symptoms have included impairments in short term memory . There have been associated  symptoms of anxiety  and agitation . He does have mild impairments in ADL's. His family manages his medications and finances. He only drives to close stores now. He is currently residing at home with his wife. 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     He is forgetful about if he or what he has eaten when she gets home from work. He does drive her to and from work and does not get lost driving locally. He has completed some cognitive therapy visits with speech therapy at [facility]-insurance would not approve additional visits since there was not significant improvement noted.     He is also having some low back issues and has been evaluated with [facility] Neurosurgery 4/2022. No surgical intervention recommended.      Prior evaluation has included a normal MRI of the brain 2019 showing minimal generalized atrophy and 2 punctate chronic small vessel ischemic     The following portions of the " patient's history were reviewed and updated as appropriate: allergies, current medications, past family history, past medical history, past social history, past surgical history and problem list.    Past Medical History:   Diagnosis Date   • Chest pain     Chest pain/dyspnea: Considering anginal equivalent and his risk factors, we will evaluate with a stress echocardiogram at his convenience and send a letter with the results and further recommendations.   • Difficulty walking 2022   • Dyslipidemia     2015:  Total cholesterol 186, triglycerides 147, HDL 37, .ASCVD 41.9%.   • First degree atrioventricular block     Chronicity unknown at this time, possibly first told 20 years ago.    • Hearing loss    • Hyperlipidemia    • Hypertension    • Major neurocognitive disorder due to Alzheimer's disease, with behavioral disturbance 2022   • Memory loss    • OA (osteoarthritis)        Past Surgical History:   Procedure Laterality Date   • HERNIA REPAIR     • OTHER SURGICAL HISTORY      Remote skull surgery.   • POLYPECTOMY         Social History     Socioeconomic History   • Marital status:    Tobacco Use   • Smoking status: Former     Packs/day: 2.00     Years: 15.00     Pack years: 30.00     Types: Cigarettes     Quit date: 1996     Years since quittin.3   • Smokeless tobacco: Never   • Tobacco comments:     3/11/2016:He quit smoking 20 years ago and was smoking 1 to 2 packs per day for 15-20 years before that.    Vaping Use   • Vaping Use: Never used   Substance and Sexual Activity   • Alcohol use: No   • Drug use: No   • Sexual activity: Not Currently     Partners: Female       Family History   Problem Relation Age of Onset   • Heart disease Mother    • Emphysema Mother    • Dementia Father    • No Known Problems Sister    • No Known Problems Sister           Current Outpatient Medications:   •  aspirin 81 MG chewable tablet, Chew 1 tablet Daily., Disp: , Rfl:   •  cholecalciferol  (VITAMIN D3) 25 MCG (1000 UT) tablet, Take 1 tablet by mouth Daily., Disp: , Rfl:   •  donepezil (ARICEPT) 10 MG tablet, Take 1 tablet by mouth Every Night., Disp: 90 tablet, Rfl: 3  •  finasteride (PROSCAR) 5 MG tablet, , Disp: , Rfl:   •  lisinopril (PRINIVIL,ZESTRIL) 5 MG tablet, Take 2.5 tablets by mouth Daily., Disp: , Rfl:   •  MAGNESIUM CITRATE PO, Take 500 mg by mouth Every Night., Disp: , Rfl:   •  memantine (NAMENDA) 10 MG tablet, Take 1 tablet by mouth 2 (Two) Times a Day., Disp: 180 tablet, Rfl: 3  •  Multiple Vitamins-Minerals (MULTIVITAMIN ADULT PO), Take  by mouth Daily., Disp: , Rfl:   •  Omega-3 Fatty Acids (fish oil) 1000 MG capsule capsule, Take  by mouth Daily With Breakfast., Disp: , Rfl:   •  tamsulosin (FLOMAX) 0.4 MG capsule 24 hr capsule, Take 1 capsule by mouth Daily., Disp: 30 capsule, Rfl: 0  •  vitamin B-12 (CYANOCOBALAMIN) 500 MCG tablet, Take 1 tablet by mouth 2 (two) times a day., Disp: , Rfl:      Review of Systems   Constitutional: Negative for chills, fatigue, fever and unexpected weight change.   HENT: Negative for ear pain, hearing loss, nosebleeds, rhinorrhea and sore throat.    Eyes: Negative for photophobia, pain, discharge, itching and visual disturbance.   Respiratory: Negative for cough, chest tightness, shortness of breath and wheezing.    Cardiovascular: Negative for chest pain, palpitations and leg swelling.   Gastrointestinal: Negative for abdominal pain, blood in stool, constipation, diarrhea, nausea and vomiting.   Genitourinary: Negative for dysuria, frequency, hematuria and urgency.   Musculoskeletal: Negative for arthralgias, back pain, gait problem, joint swelling, myalgias, neck pain and neck stiffness.   Skin: Negative for rash and wound.   Allergic/Immunologic: Negative for environmental allergies and food allergies.   Neurological: Negative for dizziness, tremors, seizures, syncope, speech difficulty, weakness, light-headedness, numbness and headaches.  "  Hematological: Negative for adenopathy. Does not bruise/bleed easily.   Psychiatric/Behavioral: Negative for agitation, confusion, decreased concentration, hallucinations, sleep disturbance and suicidal ideas. The patient is not nervous/anxious.    All other systems reviewed and are negative.       Objective:  /66   Pulse 58   Ht 165.1 cm (65\")   Wt 86.2 kg (190 lb)   SpO2 96%   BMI 31.62 kg/m²     Neurologic Exam     Mental Status   Oriented to person, place, and time.   Registration: recalls 3 of 3 objects. Recall at 5 minutes: recalls 1 of 3 objects.   Attention: decreased. Concentration: decreased.   Speech: speech is normal   Level of consciousness: alert  Abnormal comprehension.     Cranial Nerves     CN II   Visual fields full to confrontation.     CN III, IV, VI   Pupils are equal, round, and reactive to light.  Extraocular motions are normal.     CN V   Facial sensation intact.     CN VII   Facial expression full, symmetric.     CN VIII   Hearing: impaired (very [place], not wearing hearing aids today)    CN IX, X   CN IX normal.   CN X normal.     CN XI   CN XI normal.     CN XII   CN XII normal.     Motor Exam   Muscle bulk: normal  Overall muscle tone: normal    Strength   Strength 5/5 throughout.     Sensory Exam     + Tinel's and + Phalen's bilateral wrists R>L     Gait, Coordination, and Reflexes     Gait  Gait: (mild antalgia, some sway, no assistive device today)    Coordination   Finger to nose coordination: normal    Tremor   Resting tremor: absent  Intention tremor: absent    Reflexes   Right brachioradialis: 2+  Left brachioradialis: 2+  Right biceps: 2+  Left biceps: 2+  Right : 2+  Left : 2+  Right Miller: absent  Left Miller: absent      Physical Exam  Constitutional:       Appearance: Normal appearance.   Eyes:      Extraocular Movements: EOM normal.      Pupils: Pupils are equal, round, and reactive to light.   Musculoskeletal:      Lumbar back: Decreased range of motion. "      Comments:   Has chronic low back pain, worsens with longer distance ambulation, unchanged   Neurological:      Mental Status: He is alert and oriented to person, place, and time.      Motor: Motor strength is normal.      Coordination: Finger-Nose-Finger Test normal.      Deep Tendon Reflexes:      Reflex Scores:       Bicep reflexes are 2+ on the right side and 2+ on the left side.       Brachioradialis reflexes are 2+ on the right side and 2+ on the left side.  Psychiatric:         Mood and Affect: Mood is anxious. Inappropriate: at times, he does joke often, smiles and is cooperative          Speech: Speech normal.         Behavior: Behavior is slowed.         Thought Content: Thought content normal.         Cognition and Memory: He exhibits impaired recent memory.         Judgment: Judgment is inappropriate.        His MMSE last visit was a 27 out of 30, 2 out of 3 for word recall.  Prior to that, he scored a 24 out of 30 with 2 out of 3 for word recall.    MMSE today is a 24 out of 30, 1 out of 3 for word recall.    Assessment/Plan:       Diagnoses and all orders for this visit:    1. 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  Dispense: 90 tablet; Refill: 3  -     memantine (NAMENDA) 10 MG tablet; Take 1 tablet by mouth 2 (Two) Times a Day.  Dispense: 180 tablet; Refill: 3    2. Bilateral carpal tunnel syndrome  -      Wrist Hand Orthosis, Wrist Extension Control Cock-up         He is with his wife today. He has such a fun personality. He is in a great mood. He occasionally has some agitation, but she is giving him a magnesium supplement to help with calming him. This seems to work well. She has also been reading a book on Alzheimer's. I have provided them with information on living with Alzheimer's for him to read as well as additional Alzheimer's caregiver guide. We will  continue his current medications. He will continue with all specialists. They will follow up in 6 months or sooner if needed for further evaluation of symptoms. He is only driving locally. Otherwise, he is with his wife at all times. He did recently lose his wallet, but that was related to a UTI and urinary retention. He is doing better and following up with urology soon. They will call us with any questions or concerns prior to follow up in clinic. He does have some symptoms of carpal tunnel on exam, and I have ordered wrist splints for him to wear at bedtime. They will call us with any other questions prior to follow-up.    Total time of visit today was 27 minutes including reviewing ER and PCP notes, obtaining history from the patient and wife, completing exam, discussing findings and recommendations in detail.    Reviewed medications, potential side effects and signs and symptoms to report. Discussed risk versus benefits of treatment plan with patient and/or family-including medications, labs and radiology that may be ordered. Addressed questions and concerns during visit. Patient and/or family verbalized understanding and agree with plan.    During this visit the following were done:  Labs Reviewed [x]    Labs Ordered []    Radiology Reports Reviewed []    Radiology Ordered []    PCP Records Reviewed []    Referring Provider Records Reviewed []    ER Records Reviewed [x]    Hospital Records Reviewed []    History Obtained From Family [x] wife   Radiology Images Reviewed []    Other Reviewed []    Records Requested []      Transcribed from ambient dictation for TAMERA Hooper by Serena Trivedi.  05/12/23   18:08 EDT    Patient or patient representative verbalized consent to the visit recording.  I have personally performed the services described in this document as transcribed by the above individual, and it is both accurate and complete.  TAMERA Hooper  5/12/2023  19:57 EDT

## 2023-09-25 ENCOUNTER — TELEPHONE (OUTPATIENT)
Dept: NEUROLOGY | Facility: CLINIC | Age: 78
End: 2023-09-25
Payer: MEDICARE

## 2023-09-25 DIAGNOSIS — F43.21 GRIEF AT LOSS OF CHILD: ICD-10-CM

## 2023-09-25 DIAGNOSIS — Z63.4 GRIEF AT LOSS OF CHILD: ICD-10-CM

## 2023-09-25 DIAGNOSIS — G30.9 MAJOR NEUROCOGNITIVE DISORDER DUE TO ALZHEIMER'S DISEASE, WITH BEHAVIORAL DISTURBANCE: Primary | ICD-10-CM

## 2023-09-25 DIAGNOSIS — F02.818 MAJOR NEUROCOGNITIVE DISORDER DUE TO ALZHEIMER'S DISEASE, WITH BEHAVIORAL DISTURBANCE: Primary | ICD-10-CM

## 2023-09-25 SDOH — SOCIAL STABILITY - SOCIAL INSECURITY: DISSAPEARANCE AND DEATH OF FAMILY MEMBER: Z63.4

## 2023-09-25 NOTE — TELEPHONE ENCOUNTER
Provider: CHAY MARCH    Caller: ALPHONSO    Relationship to Patient: WIFE    Phone Number: [phone]    Reason for Call: CALLED FOR ADVICE. PATIENT'S DAUGHTER PASSED AWAY YESTERDAY AND TOMORROW THEY HAVE TO MAKE ARRANGEMENTS AND SIGN THINGS AND WHAT NOT. SHE WOULD LIKE TO KNOW IF THERE IS ANYTHING SHE SHOULD EXPECT FROM PATIENT OR ADVICE ON HOW TO MOVE FORWARD WITH HIS DEMENTIA DX. PLEASE ADVISE, THANK YOU.

## 2023-09-25 NOTE — TELEPHONE ENCOUNTER
First of all, my sincere condolences on the passing of their daughter. I am so sorry to hear this.    I would recommend if he is aware and asks about the passing of his daughter, that she keep answers very simple and direct without additional explanations. If he starts to show a lot of anxiety, we could prescribe something for the anxiety just temporarily. Sometimes patient's will also not be aware and will not show a lot of emotion and this is related to the Alzheimer's and is difficult to see, but can be seen and is part of the disease and does not mean that he does not care. The [email or web address] has some other resources for walking through grief with Alzheimer's patients. That is what I can think of right now. I would likely not include him in signing papers or making other decisions as that could be quite overwhelming unless he requests this.     Thanks, TAMERA Andre

## 2023-09-26 RX ORDER — CLONAZEPAM 0.5 MG/1
TABLET ORAL
Qty: 30 TABLET | Refills: 0 | Status: SHIPPED | OUTPATIENT
Start: 2023-09-26

## 2023-09-26 NOTE — TELEPHONE ENCOUNTER
Spoke to patients wife Shwetha and she would like something called in. He is doing okay right now and showing no emotion. She was very thankful for the information.

## 2023-10-11 DIAGNOSIS — G30.9 MAJOR NEUROCOGNITIVE DISORDER DUE TO ALZHEIMER'S DISEASE, WITH BEHAVIORAL DISTURBANCE: ICD-10-CM

## 2023-10-11 DIAGNOSIS — F02.818 MAJOR NEUROCOGNITIVE DISORDER DUE TO ALZHEIMER'S DISEASE, WITH BEHAVIORAL DISTURBANCE: ICD-10-CM

## 2023-10-11 RX ORDER — DONEPEZIL HYDROCHLORIDE 10 MG/1
10 TABLET, FILM COATED ORAL NIGHTLY
Qty: 90 TABLET | Refills: 3 | OUTPATIENT
Start: 2023-10-11

## 2023-10-30 ENCOUNTER — TELEPHONE (OUTPATIENT)
Dept: NEUROLOGY | Facility: CLINIC | Age: 78
End: 2023-10-30
Payer: MEDICARE

## 2023-10-30 NOTE — TELEPHONE ENCOUNTER
Provider: CHAY MARCH APRN    Caller: ALPHONSO    Relationship to Patient: EC      Phone Number: [phone]    Reason for Call: CALLING TO LET THE PROVIDER KNOW THAT PATIENT IS HAVING A HARD TIME SWALLOWING HIS PILL.   STATED SHE WOULD LIKE TO KNOW IF IT IS ALRIGHT TO CUT THE PILL IN HALF?    When was the patient last seen: 5-12-23    When did it start: MONTH    PLEASE CALL & ADVISE

## 2023-10-30 NOTE — TELEPHONE ENCOUNTER
She can crush the donepezil and put it in applesauce, yogurt or any other food. This is not an extended release medicine so this is safe. Thanks, Gisell

## 2024-01-08 ENCOUNTER — OFFICE VISIT (OUTPATIENT)
Dept: NEUROLOGY | Facility: CLINIC | Age: 79
End: 2024-01-08
Payer: MEDICARE

## 2024-01-08 VITALS
SYSTOLIC BLOOD PRESSURE: 130 MMHG | DIASTOLIC BLOOD PRESSURE: 70 MMHG | BODY MASS INDEX: 32.44 KG/M2 | OXYGEN SATURATION: 97 % | HEART RATE: 67 BPM | WEIGHT: 190 LBS | HEIGHT: 64 IN

## 2024-01-08 DIAGNOSIS — Z74.09 IMPAIRED FUNCTIONAL MOBILITY, BALANCE, AND ENDURANCE: Primary | ICD-10-CM

## 2024-01-08 DIAGNOSIS — G30.9 MAJOR NEUROCOGNITIVE DISORDER DUE TO ALZHEIMER'S DISEASE, WITH BEHAVIORAL DISTURBANCE: ICD-10-CM

## 2024-01-08 DIAGNOSIS — F02.818 MAJOR NEUROCOGNITIVE DISORDER DUE TO ALZHEIMER'S DISEASE, WITH BEHAVIORAL DISTURBANCE: ICD-10-CM

## 2024-01-08 DIAGNOSIS — H91.93 BILATERAL HEARING LOSS, UNSPECIFIED HEARING LOSS TYPE: Chronic | ICD-10-CM

## 2024-01-08 DIAGNOSIS — G56.03 BILATERAL CARPAL TUNNEL SYNDROME: ICD-10-CM

## 2024-01-08 PROCEDURE — 3075F SYST BP GE 130 - 139MM HG: CPT | Performed by: NURSE PRACTITIONER

## 2024-01-08 PROCEDURE — 1160F RVW MEDS BY RX/DR IN RCRD: CPT | Performed by: NURSE PRACTITIONER

## 2024-01-08 PROCEDURE — 1159F MED LIST DOCD IN RCRD: CPT | Performed by: NURSE PRACTITIONER

## 2024-01-08 PROCEDURE — 99214 OFFICE O/P EST MOD 30 MIN: CPT | Performed by: NURSE PRACTITIONER

## 2024-01-08 PROCEDURE — 3078F DIAST BP <80 MM HG: CPT | Performed by: NURSE PRACTITIONER

## 2024-01-08 RX ORDER — MEMANTINE HYDROCHLORIDE 10 MG/1
10 TABLET ORAL 2 TIMES DAILY
Qty: 180 TABLET | Refills: 3 | Status: SHIPPED | OUTPATIENT
Start: 2024-01-08

## 2024-01-08 RX ORDER — TAMSULOSIN HYDROCHLORIDE 0.4 MG/1
1 CAPSULE ORAL DAILY
COMMUNITY
Start: 2023-10-26

## 2024-01-08 RX ORDER — DONEPEZIL HYDROCHLORIDE 10 MG/1
10 TABLET, FILM COATED ORAL NIGHTLY
Qty: 90 TABLET | Refills: 3 | Status: SHIPPED | OUTPATIENT
Start: 2024-01-08

## 2024-01-08 NOTE — LETTER
January 9, 2024     Calvin Coley MD  [address] [facility]  [address] [place] [id number]    Patient: Aj Marie   YOB: 1945   Date of Visit: 1/8/2024       Dear Calvin Coley MD    Aj Marie was in my office today. Below is a copy of my note.    If you have questions, please do not hesitate to call me. I look forward to following Aj along with you.         Sincerely,        TAMERA Hooper        CC: No Recipients    Subjective:     Patient ID: Aj Marie is a 78 y.o. male.    CC:   Chief Complaint   Patient presents with   • Alzheimer's Disease       HPI:   History of Present Illness  Today 1/8/2024- This is an 78-year-old male who presents for 7-month neurology follow-up on Alzheimer's disease. He has been taking donepezil and memantine for cognitive enhancement.     At his last visit in clinic, we did prescribe a bilateral hand orthosis for carpal tunnel syndrome.     He is accompanied by his wife during today's visit. Unfortunately, the patient's daughter passed away on 09/24/2023, and the patient's wife contacted us about additional advice for how to move forward with him. We did provide them additional Alzheimer's resources. We also sent him some low-dose clonazepam just in case he were to become agitated during that conversation.     She also contacted us on 10/30/2023 and noted that Mr. Marie was having trouble swallowing his medication. We explained that the donepezil can be crushed and placed in applesauce or yogurt.     He is here for follow-up and reevaluation of symptoms today.    Today, he reports he is doing well. His wife states he is doing well with his memory, but his short term memory is still very poor. He forgets he has memory loss. He denies any hallucinations, delusions, agitation, or paranoia. He confirms he is wearing his hearing aids, but his wife states one of the hearing aids is not working correctly.     He states his mood is fine. His  "wife states the only time he really gets frustrated is when she questions him.    His wife states he is doing better with swallowing his medication. He notes he did have a hard time swallowing watermelon.     His wife states he lost his balance while carrying things and fell, so they got a wagon to help with carrying groceries. His wife states he has not had any injuries when he has fallen. She states he has problems with coordination. His wife states he has to hold onto the grocery cart basket when he is walking. She states when he first wakes up, he is holding onto everything. His wife states he has had physical therapy in the past. His wife states he is on the treadmill at least twice per week.    His wife states he has problems with opening things. She states he has numbness and tingling in his fingers. His wife states he has not worn wrist splints at bedtime.     His wife states he is sleeping a lot more. She states he does not have sleep apnea. She states he does snore. She tells me he \"still has his sense of humor\".    Prior neurological workup and history:  Alzheimer's disease with symptoms present since 2019. He has been on donepezil 10 mg daily along with memantine 10 mg twice per day. He had previously been prescribed mirtazapine but this was discontinued.      He has completed physical therapy. He was given exercises to do at home. His wife states he also gets on their stationary bike 3 times a week. His wife notes he sits a lot during the day. She states he reads mystery books, has puzzle books that he works on, and plays on his phone. His wife states they are trying to stay active. His wife is working part time. He uses a treadmill twice a week.      He does have poor short term memory and he forgets his diagnosis often.    Memory changes noted and reviewed imaging previously with patient and wife on 2/24/2021.  And screening blood work with PCP normal B12, folate and RPR was negative.Most of his " "symptoms began following a Whipple procedure for a pancreatic cyst in November 2019 marked by significant delirium. 3 year college education.     Of note the neurocognitive testing completed at the [facility] 6/30/2021 in summary showed findings consistent with a \"major neurocognitive disorder, mild severity due to early Alzheimer's disease with concomitant white matter changes of the brain along with mild behavioral disturbance\"-per Brittany Flores, PhD.   Recommendations were to manage vascular risk factors as well as discussed additional cognitive enhancers.     He has noted symptoms since at least 2019 marked initially by forgetfulness  and word-finding difficulties . This has gradually worsened  over time. Additional symptoms have included impairments in short term memory . There have been associated  symptoms of anxiety  and agitation . He does have mild impairments in ADL's. His family manages his medications and finances. He only drives to close stores now. He is currently residing at home with his wife. 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     He is forgetful about if he or what he has eaten when she gets home from work. He does drive her to and from work and does not get lost driving locally. He has completed some cognitive therapy visits with speech therapy at [facility]-insurance would not approve additional visits since there was not significant improvement noted.     He is also having some low back issues and has been evaluated with [facility] 4/2022. No surgical intervention recommended.       Prior evaluation has included a normal MRI of the brain 2019 showing minimal generalized atrophy and 2 punctate chronic small vessel ischemic    The following portions of the patient's history were reviewed and updated as appropriate: allergies, current medications, past family history, past medical history, past social " history, past surgical history, and problem list.    Past Medical History:   Diagnosis Date   • Chest pain     Chest pain/dyspnea: Considering anginal equivalent and his risk factors, we will evaluate with a stress echocardiogram at his convenience and send a letter with the results and further recommendations.   • Difficulty walking 2022   • Dyslipidemia     2015:  Total cholesterol 186, triglycerides 147, HDL 37, .ASCVD 41.9%.   • First degree atrioventricular block     Chronicity unknown at this time, possibly first told 20 years ago.    • Hearing loss    • Hyperlipidemia    • Hypertension    • Major neurocognitive disorder due to Alzheimer's disease, with behavioral disturbance 2022   • Memory loss    • OA (osteoarthritis)        Past Surgical History:   Procedure Laterality Date   • HERNIA REPAIR     • OTHER SURGICAL HISTORY      Remote skull surgery.   • POLYPECTOMY         Social History     Socioeconomic History   • Marital status:    Tobacco Use   • Smoking status: Former     Packs/day: 2.00     Years: 15.00     Additional pack years: 0.00     Total pack years: 30.00     Types: Cigarettes     Quit date: 1996     Years since quittin.0   • Smokeless tobacco: Never   • Tobacco comments:     3/11/2016:He quit smoking 20 years ago and was smoking 1 to 2 packs per day for 15-20 years before that.    Vaping Use   • Vaping Use: Never used   Substance and Sexual Activity   • Alcohol use: No   • Drug use: No   • Sexual activity: Not Currently     Partners: Female       Family History   Problem Relation Age of Onset   • Heart disease Mother    • Emphysema Mother    • Dementia Father    • No Known Problems Sister    • No Known Problems Sister           Current Outpatient Medications:   •  aspirin 81 MG chewable tablet, Chew 1 tablet Daily., Disp: , Rfl:   •  cholecalciferol (VITAMIN D3) 25 MCG (1000 UT) tablet, Take 1 tablet by mouth Daily., Disp: , Rfl:   •  clonazePAM  "(KlonoPIN) 0.5 MG tablet, Give 1/2 to 1 tablet twice a day if needed for anxiety or panic symptoms. Use sparingly. Monitor for falls and sleepiness, Disp: 30 tablet, Rfl: 0  •  donepezil (ARICEPT) 10 MG tablet, Take 1 tablet by mouth Every Night., Disp: 90 tablet, Rfl: 3  •  finasteride (PROSCAR) 5 MG tablet, , Disp: , Rfl:   •  lisinopril (PRINIVIL,ZESTRIL) 5 MG tablet, Take 2.5 tablets by mouth Daily., Disp: , Rfl:   •  MAGNESIUM CITRATE PO, Take 500 mg by mouth Every Night., Disp: , Rfl:   •  memantine (NAMENDA) 10 MG tablet, Take 1 tablet by mouth 2 (Two) Times a Day., Disp: 180 tablet, Rfl: 3  •  Multiple Vitamins-Minerals (MULTIVITAMIN ADULT PO), Take  by mouth Daily., Disp: , Rfl:   •  Omega-3 Fatty Acids (fish oil) 1000 MG capsule capsule, Take  by mouth Daily With Breakfast., Disp: , Rfl:   •  tamsulosin (FLOMAX) 0.4 MG capsule 24 hr capsule, Take 1 capsule by mouth Daily., Disp: , Rfl:   •  vitamin B-12 (CYANOCOBALAMIN) 500 MCG tablet, Take 1 tablet by mouth 2 (two) times a day., Disp: , Rfl:      Review of Systems   HENT:  Positive for hearing loss.    Musculoskeletal:  Positive for gait problem.   Psychiatric/Behavioral:  Positive for agitation, confusion and decreased concentration.    All other systems reviewed and are negative.       Objective:  /70   Pulse 67   Ht 162.6 cm (64\")   Wt 86.2 kg (190 lb)   SpO2 97%   BMI 32.61 kg/m²     Neurologic Exam     Mental Status   Oriented to person.   Oriented to place.   Disoriented to time.   Registration: recalls 3 of 3 objects. Recall at 5 minutes: recalls 2 of 3 objects.   Speech: speech is normal   Level of consciousness: alert  Abnormal comprehension.     Cranial Nerves     CN II   Visual fields full to confrontation.     CN III, IV, VI   Pupils are equal, round, and reactive to light.  Extraocular motions are normal.     CN V   Facial sensation intact.     CN VII   Facial expression full, symmetric.     CN VIII   Hearing: impaired (wearing " hearing aids today)    CN IX, X   CN IX normal.   CN X normal.     CN XI   CN XI normal.     CN XII   CN XII normal.     Motor Exam   Muscle bulk: normal  Overall muscle tone: normal    Strength   Strength 5/5 throughout.     Gait, Coordination, and Reflexes     Gait  Gait: wide-based (mild antalgia chronic)    Coordination   Finger to nose coordination: normal    Tremor   Resting tremor: absent  Intention tremor: absent  Action tremor: absent    Reflexes   Right : 2+  Left : 2+      Physical Exam  Constitutional:       Appearance: Normal appearance.   Eyes:      Extraocular Movements: EOM normal.      Pupils: Pupils are equal, round, and reactive to light.   Neurological:      Mental Status: He is alert.      Motor: Motor strength is normal.     Coordination: Finger-Nose-Finger Test normal.   Psychiatric:         Mood and Affect: Mood and affect normal.         Speech: Speech normal.         Behavior: Behavior is slowed.         Thought Content: Thought content normal.         Cognition and Memory: He exhibits impaired recent memory.         Judgment: Judgment is inappropriate (forgets he has Alzheimer's).      Comments:   He jokes and is jovial     His MMSE today is 25 out of 30, 2 out of 3 for word recall, disoriented to date.   Previously MMSE was 24 out of 30.    Assessment/Plan:       Diagnoses and all orders for this visit:    1. Impaired functional mobility, balance, and endurance (Primary)  Comments:  call when ready for Neuro PT consult orders    2. Major neurocognitive disorder due to Alzheimer's disease, with behavioral disturbance  -     memantine (NAMENDA) 10 MG tablet; Take 1 tablet by mouth 2 (Two) Times a Day.  Dispense: 180 tablet; Refill: 3  -     donepezil (ARICEPT) 10 MG tablet; Take 1 tablet by mouth Every Night.  Dispense: 90 tablet; Refill: 3    3. Bilateral hearing loss, unspecified hearing loss type  Comments:  encourage hearing aids use consistently    4. Bilateral carpal tunnel  syndrome  Comments:  wrist splints printed order again nightly, if not better consider OT and ortho eval    He is with his wife today. He is doing fairly well overall.     He is still having some difficulty with his balance and some falls. When his wife has completed her vestibular rehab, she would like to go ahead and get him scheduled for neuro PT. She will contact me when she is ready for that and we will place the order to [facility].     I have printed off his orders for his wrist splints again that we discussed previously. I encouraged him to wear those nightly. His wife will obtain those and then we can reevaluate in the future if symptoms persist or worsen.     I printed off Alzheimer's caregiver guide and living with Alzheimer's guide for them.     Follow up in 07/2024 and then in 11/2024. Call with any questions or concerns prior to follow-up in clinic. They verbalized understanding and agreed with the plan moving forward.      Reviewed medications, potential side effects and signs and symptoms to report. Discussed risk versus benefits of treatment plan with patient and/or family-including medications, labs and radiology that may be ordered. Addressed questions and concerns during visit. Patient and/or family verbalized understanding and agree with plan.      During this visit the following were done:  Labs Reviewed []    Labs Ordered []    Radiology Reports Reviewed []    Radiology Ordered []    PCP Records Reviewed []    Referring Provider Records Reviewed []    ER Records Reviewed []    Hospital Records Reviewed []    History Obtained From Family [x]  wife  Radiology Images Reviewed []    Other Reviewed []    Records Requested []      Transcribed from ambient dictation for TAMERA Hooper by Radha Dior.  01/08/24   16:34 EST    Patient or patient representative verbalized consent to the visit recording.  I have personally performed the services described in this document as  transcribed by the above individual, and it is both accurate and complete.  Gisell Spearser, APRN  1/9/2024  08:11 EST    Note to patient: The 21st Century Cures Act makes medical notes like these available to patients in the interest of transparency. However, be advised this is a medical document. It is intended as peer to peer communication. It is written in medical language and may contain abbreviations or verbiage that are unfamiliar. It may appear blunt or direct. Medical documents are intended to carry relevant information, facts as evident, and the clinical opinion of the provider.

## 2024-01-08 NOTE — PROGRESS NOTES
Subjective:     Patient ID: Aj Marie is a 78 y.o. male.    CC:   Chief Complaint   Patient presents with    Alzheimer's Disease       HPI:   History of Present Illness  Today 1/8/2024- This is an 78-year-old male who presents for 7-month neurology follow-up on Alzheimer's disease. He has been taking donepezil and memantine for cognitive enhancement.     At his last visit in clinic, we did prescribe a bilateral hand orthosis for carpal tunnel syndrome.     He is accompanied by his wife during today's visit. Unfortunately, the patient's daughter passed away on 09/24/2023, and the patient's wife contacted us about additional advice for how to move forward with him. We did provide them additional Alzheimer's resources. We also sent him some low-dose clonazepam just in case he were to become agitated during that conversation.     She also contacted us on 10/30/2023 and noted that Mr. Marie was having trouble swallowing his medication. We explained that the donepezil can be crushed and placed in applesauce or yogurt.     He is here for follow-up and reevaluation of symptoms today.    Today, he reports he is doing well. His wife states he is doing well with his memory, but his short term memory is still very poor. He forgets he has memory loss. He denies any hallucinations, delusions, agitation, or paranoia. He confirms he is wearing his hearing aids, but his wife states one of the hearing aids is not working correctly.     He states his mood is fine. His wife states the only time he really gets frustrated is when she questions him.    His wife states he is doing better with swallowing his medication. He notes he did have a hard time swallowing watermelon.     His wife states he lost his balance while carrying things and fell, so they got a wagon to help with carrying groceries. His wife states he has not had any injuries when he has fallen. She states he has problems with coordination. His wife states he has  "to hold onto the grocery cart basket when he is walking. She states when he first wakes up, he is holding onto everything. His wife states he has had physical therapy in the past. His wife states he is on the treadmill at least twice per week.    His wife states he has problems with opening things. She states he has numbness and tingling in his fingers. His wife states he has not worn wrist splints at bedtime.     His wife states he is sleeping a lot more. She states he does not have sleep apnea. She states he does snore. She tells me he \"still has his sense of humor\".    Prior neurological workup and history:  Alzheimer's disease with symptoms present since 2019. He has been on donepezil 10 mg daily along with memantine 10 mg twice per day. He had previously been prescribed mirtazapine but this was discontinued.      He has completed physical therapy. He was given exercises to do at home. His wife states he also gets on their stationary bike 3 times a week. His wife notes he sits a lot during the day. She states he reads mystery books, has puzzle books that he works on, and plays on his phone. His wife states they are trying to stay active. His wife is working part time. He uses a treadmill twice a week.      He does have poor short term memory and he forgets his diagnosis often.    Memory changes noted and reviewed imaging previously with patient and wife on 2/24/2021.  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 3 year college education.     Of note the neurocognitive testing completed at the [facility] 6/30/2021 in summary showed findings consistent with a \"major neurocognitive disorder, mild severity due to early Alzheimer's disease with concomitant white matter changes of the brain along with mild behavioral disturbance\"-per Brittany Flores, PhD.   " Recommendations were to manage vascular risk factors as well as discussed additional cognitive enhancers.     He has noted symptoms since at least 2019 marked initially by forgetfulness  and word-finding difficulties . This has gradually worsened  over time. Additional symptoms have included impairments in short term memory . There have been associated  symptoms of anxiety  and agitation . He does have mild impairments in ADL's. His family manages his medications and finances. He only drives to close stores now. He is currently residing at home with his wife. 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     He is forgetful about if he or what he has eaten when she gets home from work. He does drive her to and from work and does not get lost driving locally. He has completed some cognitive therapy visits with speech therapy at [facility]-insurance would not approve additional visits since there was not significant improvement noted.     He is also having some low back issues and has been evaluated with [facility] 4/2022. No surgical intervention recommended.       Prior evaluation has included a normal MRI of the brain 2019 showing minimal generalized atrophy and 2 punctate chronic small vessel ischemic    The following portions of the patient's history were reviewed and updated as appropriate: allergies, current medications, past family history, past medical history, past social history, past surgical history, and problem list.    Past Medical History:   Diagnosis Date    Chest pain     Chest pain/dyspnea: Considering anginal equivalent and his risk factors, we will evaluate with a stress echocardiogram at his convenience and send a letter with the results and further recommendations.    Difficulty walking 8/2022    Dyslipidemia     November 2015:  Total cholesterol 186, triglycerides 147, HDL 37, .ASCVD 41.9%.    First degree  atrioventricular block     Chronicity unknown at this time, possibly first told 20 years ago.     Hearing loss     Hyperlipidemia     Hypertension     Major neurocognitive disorder due to Alzheimer's disease, with behavioral disturbance 2022    Memory loss     OA (osteoarthritis)        Past Surgical History:   Procedure Laterality Date    HERNIA REPAIR      OTHER SURGICAL HISTORY      Remote skull surgery.    POLYPECTOMY         Social History     Socioeconomic History    Marital status:    Tobacco Use    Smoking status: Former     Packs/day: 2.00     Years: 15.00     Additional pack years: 0.00     Total pack years: 30.00     Types: Cigarettes     Quit date: 1996     Years since quittin.0    Smokeless tobacco: Never    Tobacco comments:     3/11/2016:He quit smoking 20 years ago and was smoking 1 to 2 packs per day for 15-20 years before that.    Vaping Use    Vaping Use: Never used   Substance and Sexual Activity    Alcohol use: No    Drug use: No    Sexual activity: Not Currently     Partners: Female       Family History   Problem Relation Age of Onset    Heart disease Mother     Emphysema Mother     Dementia Father     No Known Problems Sister     No Known Problems Sister           Current Outpatient Medications:     aspirin 81 MG chewable tablet, Chew 1 tablet Daily., Disp: , Rfl:     cholecalciferol (VITAMIN D3) 25 MCG (1000 UT) tablet, Take 1 tablet by mouth Daily., Disp: , Rfl:     clonazePAM (KlonoPIN) 0.5 MG tablet, Give 1/2 to 1 tablet twice a day if needed for anxiety or panic symptoms. Use sparingly. Monitor for falls and sleepiness, Disp: 30 tablet, Rfl: 0    donepezil (ARICEPT) 10 MG tablet, Take 1 tablet by mouth Every Night., Disp: 90 tablet, Rfl: 3    finasteride (PROSCAR) 5 MG tablet, , Disp: , Rfl:     lisinopril (PRINIVIL,ZESTRIL) 5 MG tablet, Take 2.5 tablets by mouth Daily., Disp: , Rfl:     MAGNESIUM CITRATE PO, Take 500 mg by mouth Every Night., Disp: , Rfl:      "memantine (NAMENDA) 10 MG tablet, Take 1 tablet by mouth 2 (Two) Times a Day., Disp: 180 tablet, Rfl: 3    Multiple Vitamins-Minerals (MULTIVITAMIN ADULT PO), Take  by mouth Daily., Disp: , Rfl:     Omega-3 Fatty Acids (fish oil) 1000 MG capsule capsule, Take  by mouth Daily With Breakfast., Disp: , Rfl:     tamsulosin (FLOMAX) 0.4 MG capsule 24 hr capsule, Take 1 capsule by mouth Daily., Disp: , Rfl:     vitamin B-12 (CYANOCOBALAMIN) 500 MCG tablet, Take 1 tablet by mouth 2 (two) times a day., Disp: , Rfl:      Review of Systems   HENT:  Positive for hearing loss.    Musculoskeletal:  Positive for gait problem.   Psychiatric/Behavioral:  Positive for agitation, confusion and decreased concentration.    All other systems reviewed and are negative.       Objective:  /70   Pulse 67   Ht 162.6 cm (64\")   Wt 86.2 kg (190 lb)   SpO2 97%   BMI 32.61 kg/m²     Neurologic Exam     Mental Status   Oriented to person.   Oriented to place.   Disoriented to time.   Registration: recalls 3 of 3 objects. Recall at 5 minutes: recalls 2 of 3 objects.   Speech: speech is normal   Level of consciousness: alert  Abnormal comprehension.     Cranial Nerves     CN II   Visual fields full to confrontation.     CN III, IV, VI   Pupils are equal, round, and reactive to light.  Extraocular motions are normal.     CN V   Facial sensation intact.     CN VII   Facial expression full, symmetric.     CN VIII   Hearing: impaired (wearing hearing aids today)    CN IX, X   CN IX normal.   CN X normal.     CN XI   CN XI normal.     CN XII   CN XII normal.     Motor Exam   Muscle bulk: normal  Overall muscle tone: normal    Strength   Strength 5/5 throughout.     Gait, Coordination, and Reflexes     Gait  Gait: wide-based (mild antalgia chronic)    Coordination   Finger to nose coordination: normal    Tremor   Resting tremor: absent  Intention tremor: absent  Action tremor: absent    Reflexes   Right : 2+  Left : 2+      Physical " Exam  Constitutional:       Appearance: Normal appearance.   Eyes:      Extraocular Movements: EOM normal.      Pupils: Pupils are equal, round, and reactive to light.   Neurological:      Mental Status: He is alert.      Motor: Motor strength is normal.     Coordination: Finger-Nose-Finger Test normal.   Psychiatric:         Mood and Affect: Mood and affect normal.         Speech: Speech normal.         Behavior: Behavior is slowed.         Thought Content: Thought content normal.         Cognition and Memory: He exhibits impaired recent memory.         Judgment: Judgment is inappropriate (forgets he has Alzheimer's).      Comments:   He jokes and is jovial     His MMSE today is 25 out of 30, 2 out of 3 for word recall, disoriented to date.   Previously MMSE was 24 out of 30.    Assessment/Plan:       Diagnoses and all orders for this visit:    1. Impaired functional mobility, balance, and endurance (Primary)  Comments:  call when ready for Neuro PT consult orders    2. Major neurocognitive disorder due to Alzheimer's disease, with behavioral disturbance  -     memantine (NAMENDA) 10 MG tablet; Take 1 tablet by mouth 2 (Two) Times a Day.  Dispense: 180 tablet; Refill: 3  -     donepezil (ARICEPT) 10 MG tablet; Take 1 tablet by mouth Every Night.  Dispense: 90 tablet; Refill: 3    3. Bilateral hearing loss, unspecified hearing loss type  Comments:  encourage hearing aids use consistently    4. Bilateral carpal tunnel syndrome  Comments:  wrist splints printed order again nightly, if not better consider OT and ortho eval    He is with his wife today. He is doing fairly well overall.     He is still having some difficulty with his balance and some falls. When his wife has completed her vestibular rehab, she would like to go ahead and get him scheduled for neuro PT. She will contact me when she is ready for that and we will place the order to [facility].     I have printed off his orders for his wrist splints  again that we discussed previously. I encouraged him to wear those nightly. His wife will obtain those and then we can reevaluate in the future if symptoms persist or worsen.     I printed off Alzheimer's caregiver guide and living with Alzheimer's guide for them.     Follow up in 07/2024 and then in 11/2024. Call with any questions or concerns prior to follow-up in clinic. They verbalized understanding and agreed with the plan moving forward.      Reviewed medications, potential side effects and signs and symptoms to report. Discussed risk versus benefits of treatment plan with patient and/or family-including medications, labs and radiology that may be ordered. Addressed questions and concerns during visit. Patient and/or family verbalized understanding and agree with plan.      During this visit the following were done:  Labs Reviewed []    Labs Ordered []    Radiology Reports Reviewed []    Radiology Ordered []    PCP Records Reviewed []    Referring Provider Records Reviewed []    ER Records Reviewed []    Hospital Records Reviewed []    History Obtained From Family [x]  wife  Radiology Images Reviewed []    Other Reviewed []    Records Requested []      Transcribed from ambient dictation for TAMERA Hooper by Radha Dior.  01/08/24   16:34 EST    Patient or patient representative verbalized consent to the visit recording.  I have personally performed the services described in this document as transcribed by the above individual, and it is both accurate and complete.  TAMERA Hooper  1/9/2024  08:11 EST    Note to patient: The 21st Century Cures Act makes medical notes like these available to patients in the interest of transparency. However, be advised this is a medical document. It is intended as peer to peer communication. It is written in medical language and may contain abbreviations or verbiage that are unfamiliar. It may appear blunt or direct. Medical documents are intended to  carry relevant information, facts as evident, and the clinical opinion of the provider.

## 2024-01-10 ENCOUNTER — TELEPHONE (OUTPATIENT)
Dept: NEUROLOGY | Facility: CLINIC | Age: 79
End: 2024-01-10
Payer: MEDICARE

## 2024-01-10 NOTE — TELEPHONE ENCOUNTER
----- Message from TAMERA Hooper sent at 1/8/2024  4:39 PM EST -----  ADD MOCA NOTE TO JULY VISIT PLEASE

## 2024-05-07 ENCOUNTER — APPOINTMENT (OUTPATIENT)
Dept: CT IMAGING | Facility: HOSPITAL | Age: 79
End: 2024-05-07
Payer: MEDICARE

## 2024-05-07 ENCOUNTER — TELEPHONE (OUTPATIENT)
Dept: NEUROLOGY | Facility: CLINIC | Age: 79
End: 2024-05-07
Payer: MEDICARE

## 2024-05-07 ENCOUNTER — HOSPITAL ENCOUNTER (EMERGENCY)
Facility: HOSPITAL | Age: 79
Discharge: HOME | End: 2024-05-07
Attending: EMERGENCY MEDICINE
Payer: MEDICARE

## 2024-05-07 VITALS
DIASTOLIC BLOOD PRESSURE: 77 MMHG | RESPIRATION RATE: 16 BRPM | TEMPERATURE: 97.6 F | OXYGEN SATURATION: 96 % | SYSTOLIC BLOOD PRESSURE: 156 MMHG | HEIGHT: 67 IN | HEART RATE: 78 BPM | WEIGHT: 192 LBS | BODY MASS INDEX: 30.13 KG/M2

## 2024-05-07 DIAGNOSIS — Z91.89 CANDIDATE FOR STATIN THERAPY DUE TO RISK OF FUTURE CARDIOVASCULAR EVENT: ICD-10-CM

## 2024-05-07 DIAGNOSIS — R93.89 ABNORMAL MRI: ICD-10-CM

## 2024-05-07 DIAGNOSIS — F03.90 DEMENTIA, UNSPECIFIED DEMENTIA SEVERITY, UNSPECIFIED DEMENTIA TYPE, UNSPECIFIED WHETHER BEHAVIORAL, PSYCHOTIC, OR MOOD DISTURBANCE OR ANXIETY: ICD-10-CM

## 2024-05-07 DIAGNOSIS — I70.90 ATHEROSCLEROSIS: ICD-10-CM

## 2024-05-07 DIAGNOSIS — I63.9 ACUTE CVA (CEREBROVASCULAR ACCIDENT): Primary | ICD-10-CM

## 2024-05-07 DIAGNOSIS — I63.9 CEREBROVASCULAR ACCIDENT (CVA), UNSPECIFIED MECHANISM: ICD-10-CM

## 2024-05-07 DIAGNOSIS — I10 ESSENTIAL HYPERTENSION: ICD-10-CM

## 2024-05-07 DIAGNOSIS — K76.0 NAFLD (NONALCOHOLIC FATTY LIVER DISEASE): ICD-10-CM

## 2024-05-07 LAB
ALBUMIN SERPL-MCNC: 3.8 G/DL (ref 3.5–5.2)
ALBUMIN/GLOB SERPL: 1.4 G/DL
ALP SERPL-CCNC: 82 U/L (ref 39–117)
ALT SERPL W P-5'-P-CCNC: 13 U/L (ref 1–41)
ANION GAP SERPL CALCULATED.3IONS-SCNC: 10 MMOL/L (ref 5–15)
AST SERPL-CCNC: 23 U/L (ref 1–40)
BASOPHILS # BLD AUTO: 0.02 10*3/MM3 (ref 0–0.2)
BASOPHILS NFR BLD AUTO: 0.3 % (ref 0–1.5)
BILIRUB SERPL-MCNC: 0.6 MG/DL (ref 0–1.2)
BUN SERPL-MCNC: 26 MG/DL (ref 8–23)
BUN/CREAT SERPL: 17.4 (ref 7–25)
CALCIUM SPEC-SCNC: 9.1 MG/DL (ref 8.6–10.5)
CHLORIDE SERPL-SCNC: 103 MMOL/L (ref 98–107)
CO2 SERPL-SCNC: 26 MMOL/L (ref 22–29)
CREAT BLDA-MCNC: 1.7 MG/DL (ref 0.6–1.3)
CREAT SERPL-MCNC: 1.49 MG/DL (ref 0.76–1.27)
DEPRECATED RDW RBC AUTO: 44.3 FL (ref 37–54)
EGFRCR SERPLBLD CKD-EPI 2021: 46.9 ML/MIN/1.73
EOSINOPHIL # BLD AUTO: 0.15 10*3/MM3 (ref 0–0.4)
EOSINOPHIL NFR BLD AUTO: 2.4 % (ref 0.3–6.2)
ERYTHROCYTE [DISTWIDTH] IN BLOOD BY AUTOMATED COUNT: 14 % (ref 12.3–15.4)
GLOBULIN UR ELPH-MCNC: 2.8 GM/DL
GLUCOSE SERPL-MCNC: 129 MG/DL (ref 65–99)
HCT VFR BLD AUTO: 44.5 % (ref 37.5–51)
HGB BLD-MCNC: 14.7 G/DL (ref 13–17.7)
IMM GRANULOCYTES # BLD AUTO: 0.02 10*3/MM3 (ref 0–0.05)
IMM GRANULOCYTES NFR BLD AUTO: 0.3 % (ref 0–0.5)
LYMPHOCYTES # BLD AUTO: 1.54 10*3/MM3 (ref 0.7–3.1)
LYMPHOCYTES NFR BLD AUTO: 24.7 % (ref 19.6–45.3)
MCH RBC QN AUTO: 29.1 PG (ref 26.6–33)
MCHC RBC AUTO-ENTMCNC: 33 G/DL (ref 31.5–35.7)
MCV RBC AUTO: 87.9 FL (ref 79–97)
MONOCYTES # BLD AUTO: 0.68 10*3/MM3 (ref 0.1–0.9)
MONOCYTES NFR BLD AUTO: 10.9 % (ref 5–12)
NEUTROPHILS NFR BLD AUTO: 3.82 10*3/MM3 (ref 1.7–7)
NEUTROPHILS NFR BLD AUTO: 61.4 % (ref 42.7–76)
NRBC BLD AUTO-RTO: 0 /100 WBC (ref 0–0.2)
PLATELET # BLD AUTO: 163 10*3/MM3 (ref 140–450)
PMV BLD AUTO: 9.2 FL (ref 6–12)
POTASSIUM SERPL-SCNC: 4.1 MMOL/L (ref 3.5–5.2)
PROT SERPL-MCNC: 6.6 G/DL (ref 6–8.5)
RBC # BLD AUTO: 5.06 10*6/MM3 (ref 4.14–5.8)
SODIUM SERPL-SCNC: 139 MMOL/L (ref 136–145)
WBC NRBC COR # BLD AUTO: 6.23 10*3/MM3 (ref 3.4–10.8)

## 2024-05-07 PROCEDURE — 82565 ASSAY OF CREATININE: CPT

## 2024-05-07 PROCEDURE — 36415 COLL VENOUS BLD VENIPUNCTURE: CPT

## 2024-05-07 PROCEDURE — 70496 CT ANGIOGRAPHY HEAD: CPT

## 2024-05-07 PROCEDURE — 99284 EMERGENCY DEPT VISIT MOD MDM: CPT

## 2024-05-07 PROCEDURE — 99285 EMERGENCY DEPT VISIT HI MDM: CPT

## 2024-05-07 PROCEDURE — 25510000001 IOPAMIDOL PER 1 ML: Performed by: EMERGENCY MEDICINE

## 2024-05-07 PROCEDURE — 80053 COMPREHEN METABOLIC PANEL: CPT | Performed by: EMERGENCY MEDICINE

## 2024-05-07 PROCEDURE — 70498 CT ANGIOGRAPHY NECK: CPT

## 2024-05-07 PROCEDURE — 85025 COMPLETE CBC W/AUTO DIFF WBC: CPT | Performed by: EMERGENCY MEDICINE

## 2024-05-07 RX ORDER — ATORVASTATIN CALCIUM 40 MG/1
40 TABLET, FILM COATED ORAL DAILY
Qty: 90 TABLET | Refills: 0 | Status: SHIPPED | OUTPATIENT
Start: 2024-05-07

## 2024-05-07 RX ORDER — SODIUM CHLORIDE 0.9 % (FLUSH) 0.9 %
10 SYRINGE (ML) INJECTION EVERY 12 HOURS SCHEDULED
Status: CANCELLED | OUTPATIENT
Start: 2024-05-07

## 2024-05-07 RX ORDER — SODIUM CHLORIDE 9 MG/ML
40 INJECTION, SOLUTION INTRAVENOUS AS NEEDED
Status: CANCELLED | OUTPATIENT
Start: 2024-05-07

## 2024-05-07 RX ORDER — ASPIRIN 300 MG/1
300 SUPPOSITORY RECTAL DAILY
Status: DISCONTINUED | OUTPATIENT
Start: 2024-05-07 | End: 2024-05-07 | Stop reason: HOSPADM

## 2024-05-07 RX ORDER — ASPIRIN 81 MG/1
81 TABLET ORAL DAILY
Qty: 30 TABLET | Refills: 0 | Status: SHIPPED | OUTPATIENT
Start: 2024-05-07

## 2024-05-07 RX ORDER — SODIUM CHLORIDE 0.9 % (FLUSH) 0.9 %
10 SYRINGE (ML) INJECTION AS NEEDED
Status: CANCELLED | OUTPATIENT
Start: 2024-05-07

## 2024-05-07 RX ORDER — ATORVASTATIN CALCIUM 40 MG/1
80 TABLET, FILM COATED ORAL NIGHTLY
Status: CANCELLED | OUTPATIENT
Start: 2024-05-07

## 2024-05-07 RX ORDER — ASPIRIN 81 MG/1
81 TABLET, CHEWABLE ORAL DAILY
Status: DISCONTINUED | OUTPATIENT
Start: 2024-05-07 | End: 2024-05-07 | Stop reason: HOSPADM

## 2024-05-07 RX ORDER — SOLIFENACIN SUCCINATE 5 MG/1
TABLET, FILM COATED ORAL
COMMUNITY
Start: 2024-04-22

## 2024-05-07 RX ADMIN — IOPAMIDOL 75 ML: 755 INJECTION, SOLUTION INTRAVENOUS at 17:59

## 2024-05-07 RX ADMIN — ASPIRIN 81 MG CHEWABLE TABLET 81 MG: 81 TABLET CHEWABLE at 18:41

## 2024-05-07 NOTE — CONSULTS
Stroke Consult Note    Patient Name: Aj Marie   MRN: [id number]  Age: 79 y.o.  Sex: male  : 1945    Primary Care Physician: Calvin Coley MD  Referring Physician:  Dr. Bjorn Magaña    TIME STROKE TEAM CALLED: 1640 EST     TIME PATIENT SEEN: 1700 EST    Handedness: Right  Race:      Chief Complaint/Reason for Consultation: MRI findings    HPI: Mr. Marie is an 79 year old male with known medical diagnoses of essential hypertension, hyperlipidemia, hearing loss, Alzheimer's disease (dx 2019, follows with Gisell PHILIP), and remote tobacco abuse who presented to the emergency department at the request of Dr. Goldberg's office for abnormal MRI findings.  After reviewing the patient's MRI report from [facility] (2024) there appears to be a punctate area of restricted diffusion within the right posterior cerebral hemisphere, occipital region, consistent with an acute to subacute stroke.  Due to these findings the stroke team was contacted for further evaluation and recommendations.    After speaking with the patient's wife, who is his primary giver, she reports that he has had difficulty with his left lower extremity for approximately 3 months.  They felt that this was related to his advancing dementia however his PCP recommended that he get an MRI just to ensure things had not changed since his last MRI in 2019.  The patient denies experiencing any LUE weakness, facial droop, difficulty speaking or dysarthria, dizziness, or visual disturbance.  He has noted mild balance difficulties and difficulty swallowing (which has been present for some time)    Per his wife, he does not currently take any antiplatelet or anticoagulation medications.  He is a non-smoker and denies ETOH use.    Last Known Normal Date/Time: 3 months ago EST     Review of Systems   Constitutional:  Positive for activity change. Negative for chills, fatigue and fever.   HENT:  Positive for trouble  swallowing (chronic).    Eyes:  Negative for photophobia and visual disturbance.   Respiratory: Negative.  Negative for cough, chest tightness and shortness of breath.    Cardiovascular: Negative.  Negative for chest pain and palpitations.   Gastrointestinal: Negative.  Negative for blood in stool, diarrhea, nausea and vomiting.   Genitourinary: Negative.  Negative for hematuria.   Musculoskeletal:  Positive for gait problem.   Skin: Negative.    Neurological:  Positive for weakness. Negative for dizziness, facial asymmetry, speech difficulty, numbness and headaches.   Hematological: Negative.    Psychiatric/Behavioral:  Positive for confusion.       Past Medical History:   Diagnosis Date    Chest pain     Chest pain/dyspnea: Considering anginal equivalent and his risk factors, we will evaluate with a stress echocardiogram at his convenience and send a letter with the results and further recommendations.    Difficulty walking 8/2022    Dyslipidemia     November 2015:  Total cholesterol 186, triglycerides 147, HDL 37, .ASCVD 41.9%.    First degree atrioventricular block     Chronicity unknown at this time, possibly first told 20 years ago.     Hearing loss     Hyperlipidemia     Hypertension     Major neurocognitive disorder due to Alzheimer's disease, with behavioral disturbance 04/08/2022    Memory loss     OA (osteoarthritis)      Past Surgical History:   Procedure Laterality Date    HERNIA REPAIR      OTHER SURGICAL HISTORY      Remote skull surgery.    POLYPECTOMY       Family History   Problem Relation Age of Onset    Heart disease Mother     Emphysema Mother     Dementia Father     No Known Problems Sister     No Known Problems Sister      Social History     Socioeconomic History    Marital status:    Tobacco Use    Smoking status: Former     Current packs/day: 0.00     Average packs/day: 2.0 packs/day for 15.0 years (30.0 ttl pk-yrs)     Types: Cigarettes     Start date: 1/1/1981     Quit date:  1996     Years since quittin.3    Smokeless tobacco: Never    Tobacco comments:     3/11/2016:He quit smoking 20 years ago and was smoking 1 to 2 packs per day for 15-20 years before that.    Vaping Use    Vaping status: Never Used   Substance and Sexual Activity    Alcohol use: No    Drug use: No    Sexual activity: Not Currently     Partners: Female     Allergies   Allergen Reactions    Penicillins Rash     Prior to Admission medications    Medication Sig Start Date End Date Taking? Authorizing Provider   aspirin 81 MG chewable tablet Chew 1 tablet Daily.    Mina Tuttle MD   cholecalciferol (VITAMIN D3) 25 MCG (1000 UT) tablet Take 1 tablet by mouth Daily.    Mina Tuttle MD   clonazePAM (KlonoPIN) 0.5 MG tablet Give 1/2 to 1 tablet twice a day if needed for anxiety or panic symptoms. Use sparingly. Monitor for falls and sleepiness 23   Gisell Marlow APRN   donepezil (ARICEPT) 10 MG tablet Take 1 tablet by mouth Every Night. 24   Gisell Marlow APRN   finasteride (PROSCAR) 5 MG tablet  23   Mina Tuttle MD   lisinopril (PRINIVIL,ZESTRIL) 5 MG tablet Take 2.5 tablets by mouth Daily.    Mina Tuttle MD   MAGNESIUM CITRATE PO Take 500 mg by mouth Every Night.    Mina Tuttle MD   memantine (NAMENDA) 10 MG tablet Take 1 tablet by mouth 2 (Two) Times a Day. 24   Gisell Marlow APRN   Multiple Vitamins-Minerals (MULTIVITAMIN ADULT PO) Take  by mouth Daily.    Mina Tuttle MD   Omega-3 Fatty Acids (fish oil) 1000 MG capsule capsule Take  by mouth Daily With Breakfast.    Mina Tuttle MD   tamsulosin (FLOMAX) 0.4 MG capsule 24 hr capsule Take 1 capsule by mouth Daily. 10/26/23   Mina Tuttle MD   vitamin B-12 (CYANOCOBALAMIN) 500 MCG tablet Take 1 tablet by mouth 2 (two) times a day.    Mina Tuttle MD         Temp:  [97.6 °F (36.4 °C)] 97.6 °F (36.4 °C)  Heart Rate:  [68] 68  Resp:  [16]  16  BP: (123)/(81) 123/81  Neurological Exam  Mental Status  Alert. Oriented only to person, place and time. Oriented to person, place, and time. Speech is normal. Language is fluent with no aphasia.    Cranial Nerves  CN II: Visual fields full to confrontation.  CN III, IV, VI: Extraocular movements intact bilaterally. Pupils equal round and reactive to light bilaterally.  CN V: Facial sensation is normal.  CN VII: Full and symmetric facial movement.  CN VIII: Hard of hearing.  CN XII: Tongue midline without atrophy or fasciculations.    Motor  Decreased muscle bulk throughout. Normal muscle tone. Strength is 5/5 throughout all four extremities.  Very slight drift in LLE but 5/5 strength  RLE with 5/5 strength  BUE with no drift, 5/5 strength.    Sensory  Sensation is intact to light touch, pinprick, vibration and proprioception in all four extremities.    Coordination    No obvious dysmetria noted.    Gait    Not observed.      Physical Exam  Vitals and nursing note reviewed.   Constitutional:       General: He is not in acute distress.     Appearance: Normal appearance. He is not ill-appearing.   HENT:      Head: Normocephalic and atraumatic.      Mouth/Throat:      Mouth: Mucous membranes are moist.   Eyes:      Extraocular Movements: Extraocular movements intact.      Pupils: Pupils are equal, round, and reactive to light.   Cardiovascular:      Rate and Rhythm: Normal rate and regular rhythm.   Pulmonary:      Effort: Pulmonary effort is normal. No respiratory distress.      Comments: On room air  Musculoskeletal:      Right lower leg: No edema.      Left lower leg: No edema.   Skin:     General: Skin is warm and dry.   Neurological:      Mental Status: He is alert and oriented to person, place, and time.      Cranial Nerves: No cranial nerve deficit.      Sensory: No sensory deficit.      Motor: Motor strength is normal.Weakness present.      Coordination: Coordination normal.      Comments: Confused in  conversation at times, forgetful; baseline   Psychiatric:         Mood and Affect: Mood normal.         Speech: Speech normal.         Behavior: Behavior normal.         Acute Stroke Data    Thrombolytic Inclusion / Exclusion Criteria    Time: 16:44 EDT  Person Administering Scale: TAMERA Baeza    Inclusion Criteria  [x]   18 years of age or greater   []   Onset of symptoms < 4.5 hours before beginning treatment (stroke onset = time patient was last seen well or without symptoms).   []   Diagnosis of acute ischemic stroke causing measurable disabling deficit (Complete Hemianopia, Any Aphasia, Visual or Sensory Extinction, Any weakness limiting sustained effort against gravity)   []   Any remaining deficit considered potentially disabling in view of patient and practitioner   Exclusion criteria (Do not proceed with Alteplase if any are checked under exclusion criteria)  [x]   Onset unknown or GREATER than 4.5 hours   []   ICH on CT/MRI   []   CT demonstrates hypodensity representing acute or subacute infarct   []   Significant head trauma or prior stroke in the previous 3 months   []   Symptoms suggestive of subarachnoid hemorrhage   []   History of un-ruptured intracranial aneurysm GREATER than 10 mm   []   Recent intracranial or intraspinal surgery within the last 3 months   []   Arterial puncture at a non-compressible site in the previous 7 days   []   Active internal bleeding   []   Acute bleeding tendency   []   Platelet count LESS than 100,000 for known hematological diseases such as leukemia, thrombocytopenia or chronic cirrhosis   []   Current use of anticoagulant with INR GREATER than 1.7 or PT GREATER than 15 seconds, aPTT GREATER than 40 seconds   []   Heparin received within 48 hours, resulting in abnormally elevated aPTT GREATER than upper limit of normal   []   Current use of direct thrombin inhibitors or direct factor Xa inhibitors in the past 48 hours   []   Elevated blood pressure  refractory to treatment (systolic GREATER than 185 mm/Hg or diastolic  GREATER than 110 mm/Hg   []   Suspected infective endocarditis and aortic arch dissection   []   Current use of therapeutic treatment dose of low-molecular-weight heparin (LMWH) within the previous 24 hours   []   Structural GI malignancy or bleed   Relative exclusion for all patients  []   Only minor non-disabling symptoms   []   Pregnancy   []   Seizure at onset with postictal residual neurological impairments   []   Major surgery or previous trauma within past 14 days   []   History of previous spontaneous ICH, intracranial neoplasm, or AV malformation   []   Postpartum (within previous 14 days)   []   Recent GI or urinary tract hemorrhage (within previous 21 days)   []   Recent acute MI (within previous 3 months)   []   History of un-ruptured intracranial aneurysm LESS than 10 mm   []   History of ruptured intracranial aneurysm   []   Blood glucose LESS than 50 mg/dL (2.7 mmol/L)   []   Dural puncture within the last 7 days   []   Known GREATER than 10 cerebral microbleeds   Additional exclusions for patients with symptoms onset between 3 and 4.5 hours.  []   Age > 80.   []   On any anticoagulants regardless of INR  >>> Warfarin (Coumadin), Heparin, Enoxaparin (Lovenox), fondaparinux (Arixtra), bivalirudin (Angiomax), Argatroban, dabigatran (Pradaxa), rivaroxaban (Xarelto), or apixaban (Eliquis)   []   Severe stroke (NIHSS > 25).   []   History of BOTH diabetes and previous ischemic stroke.   []   The risks and benefits have been discussed with the patient or family related to the administration of IV thrombolytic therapy for stroke symptoms.   []   I have discussed and reviewed the patient's case and imaging with the attending prior to IV thrombolytic therapy.   N/A Time IV thrombolytic administered       Hospital Meds:  Scheduled- aspirin, 81 mg, Oral, Daily   Or  aspirin, 300 mg, Rectal, Daily      Infusions-     PRNs-     Functional  Status Prior to Current Stroke/Lassen Score: 4    NIH Stroke Scale  Time: 16:44 EDT  Person Administering Scale: TAMERA Baeza    Interval: baseline  1a. Level of Consciousness: 0-->Alert, keenly responsive  1b. LOC Questions: 0-->Answers both questions correctly  1c. LOC Commands: 0-->Performs both tasks correctly  2. Best Gaze: 0-->Normal  3. Visual: 0-->No visual loss  4. Facial Palsy: 0-->Normal symmetrical movements  5a. Motor Arm, Left: 0-->No drift, limb holds 90 (or 45) degrees for full 10 secs  5b. Motor Arm, Right: 0-->No drift, limb holds 90 (or 45) degrees for full 10 secs  6a. Motor Leg, Left: 1-->Drift, leg falls by the end of the 5-sec period but does not hit bed  6b. Motor Leg, Right: 0-->No drift, leg holds 30 degree position for full 5 secs  7. Limb Ataxia: 0-->Absent  8. Sensory: 0-->Normal, no sensory loss  9. Best Language: 0-->No aphasia, normal  10. Dysarthria: 0-->Normal  11. Extinction and Inattention (formerly Neglect): 0-->No abnormality    Total (NIH Stroke Scale): 1      Results Reviewed:  I have personally reviewed current lab, radiology, and data and agree with results.    MRI report from [facility] (performed 5/6/2024) reveals a punctate area of restricted diffusion within the right PCA territory, occipital region, consistent with late acute/subacute stroke.  No evidence of hemorrhage noted.    Results for orders placed during the hospital encounter of 11/29/18    Adult Stress Echo W/ Cont or Stress Agent if Necessary Per Protocol    Interpretation Summary  · Normal exercise stress test by clinical and EKG criteria.  · Mildly reduced exercise capacity. Expected exercise duartion = 6:30 Actual =6:00  · Left ventricular systolic function is normal. Estimated EF = 65%.  · Left ventricular diastolic dysfunction (grade I) consistent with impaired relaxation.  · Mild aortic valve regurgitation is present.  · Mild mitral valve regurgitation is present.        Assessment/Plan:    This is an 79 year old male with known medical diagnoses of essential hypertension, hyperlipidemia, hearing loss, Alzheimer's disease (dx 2019, follows with Gisell PHILIP), and remote tobacco abuse who presented to the emergency department at the request of Dr. Goldberg's office for abnormal MRI findings. The patient is not a candidate for any acute stroke interventions given LKW >24 hours and stroke present on MRI.    Given patient's ongoing symptoms for 3 months and currently at his neurological baseline, I did offer for the patient to complete his stroke work-up as outpatient verses inpatient.  He and wife agree that they would like to perform TTE, A1c and Lipid panel as outpatient and follow-up in the stroke clinic.    Antiplatelet PTA: None  Anticoagulant PTA: None      Late acute/subacute right occipital stroke        Essential hypertension        Alzheimer's Disease  -Will obtain CTA head/neck to further evaluate the patient's vasculature prior to leaving to determine appropriate medication regimen   -N.p.o. until bedside nursing dysphagia screen completed  -Recommend ASA 81 mg for now  -Add Lipitor 40mg nightly  -Activity as tolerated, fall risk precautions  -Continue OP PT/OT  -A1c and lipid panel as outpatient; advised to fast prior to lab draw  -TTE as outpatient   -Continue Aricept and Namenda as prescribed; keep f/up as scheduled with Gisell PHILIP for dementia   -Stroke clinic follow-up in 1 month    Plan of care was discussed with patient, wife, and Dr. Magaña.  Wife was encouraged to bring the patient back to the ED immediately if he experienced any new stroke-like symptoms including (unilateral weakness, numbness, speech difficulty, dysarthria, decreased LOC/increased confusion, or vision loss).  She voices her understanding.    Stroke neurology will follow-up on CTA results and give final recommendations.  Please call with any questions or concerns.  Thank you for  this consult.    Becka Call, APRN  May 7, 2024  16:44 EDT    Addendum 1844:  CTA head/neck reveals multifocal intracranial atherosclerotic disease.  Reviewed by Dr. Griffith who is in agreement to proceed with ASA 81mg and satin therapy only.  Follow-up in stroke clinic in 1 month.  OK to discharge home from a neurological standpoint.

## 2024-05-07 NOTE — ED PROVIDER NOTES
[facility]    EMERGENCY DEPARTMENT ENCOUNTER      Pt Name: Aj Marie  MRN: [id number]  YOB: 1945  Date of evaluation: 5/7/2024  Provider: Bjorn Magaña DO    CHIEF COMPLAINT       Chief Complaint   Patient presents with    Extremity Weakness     HPI  Stated Reason for Visit: per wife, patient had MRI yesterday and was told to come to ED today due to possible stroke--asymptomatic per family member aside from L leg weakness x6 months History Obtained From: patient   Telephone    5/7/2024  [facility] NEUROLOGY       Gisell Marlow APRN  Neurology     All Conversations  (Oldest Message First)  May 7, 2024  Melvina Ritter MA     SD    5/7/24  9:32 AM  Note     Call from Family Practice provider upstairs.  Would like Neurologist to take a look at patients MRI results.  As Gisell is out of office am forwarding the results to  for review.         Melvina Ritter MA   to Ashlie Goldberg MD   SD      5/7/24  9:32 AM   Email to follow.  Melvina Ritter MA    Per 's review: MRI does show a subacute stroke. Yes the patient could be sent to the hospital if he has been having symptoms              HISTORY OF PRESENT ILLNESS  (Location/Symptom, Timing/Onset, Context/Setting, Quality, Duration, Modifying Factors, Severity.)   Aj Marie is a 79 y.o. male who presents to the emergency department as a referral from his neurology team with concern for abnormal MRI.  The history is provided by the patient's wife at the bedside as the patient has severe underlying dementia.  She notes he has been following with his neurology team from his dementia perspective for multiple months, she notes over the last few months he has had issues with dizziness and some off-balance concerns to been treating it with physical therapy.  Had an MRI as an outpatient yesterday at the [facility] and was called today and told that there was a slight abnormality  on his MRI he should come to the hospital for further assessment.  Patient denies any fall, injury or head trauma, not taking anticoagulant medication.  He notes some intermittent paresthesias bilateral upper extremities, wife notes many seems off balance to toppled over towards the left side.  He has no recent illness, no fevers, chills.  He states he feels normal.  Denies any other acute systemic complaints at this time.      Nursing notes were reviewed.      PAST MEDICAL HISTORY     Past Medical History:   Diagnosis Date    Chest pain     Chest pain/dyspnea: Considering anginal equivalent and his risk factors, we will evaluate with a stress echocardiogram at his convenience and send a letter with the results and further recommendations.    Difficulty walking 8/2022    Dyslipidemia     November 2015:  Total cholesterol 186, triglycerides 147, HDL 37, .ASCVD 41.9%.    First degree atrioventricular block     Chronicity unknown at this time, possibly first told 20 years ago.     Hearing loss     Hyperlipidemia     Hypertension     Major neurocognitive disorder due to Alzheimer's disease, with behavioral disturbance 04/08/2022    Memory loss     OA (osteoarthritis)          SURGICAL HISTORY       Past Surgical History:   Procedure Laterality Date    HERNIA REPAIR      OTHER SURGICAL HISTORY      Remote skull surgery.    POLYPECTOMY           CURRENT MEDICATIONS       Current Facility-Administered Medications:     aspirin chewable tablet 81 mg, 81 mg, Oral, Daily, 81 mg at 05/07/24 1841 **OR** aspirin suppository 300 mg, 300 mg, Rectal, Daily, Becka Call APRN    Current Outpatient Medications:     aspirin 81 MG chewable tablet, Chew 1 tablet Daily., Disp: , Rfl:     cholecalciferol (VITAMIN D3) 25 MCG (1000 UT) tablet, Take 1 tablet by mouth Daily., Disp: , Rfl:     clonazePAM (KlonoPIN) 0.5 MG tablet, Give 1/2 to 1 tablet twice a day if needed for anxiety or panic symptoms. Use sparingly. Monitor  for falls and sleepiness, Disp: 30 tablet, Rfl: 0    donepezil (ARICEPT) 10 MG tablet, Take 1 tablet by mouth Every Night., Disp: 90 tablet, Rfl: 3    finasteride (PROSCAR) 5 MG tablet, , Disp: , Rfl:     lisinopril (PRINIVIL,ZESTRIL) 5 MG tablet, Take 2.5 tablets by mouth Daily., Disp: , Rfl:     MAGNESIUM CITRATE PO, Take 500 mg by mouth Every Night., Disp: , Rfl:     memantine (NAMENDA) 10 MG tablet, Take 1 tablet by mouth 2 (Two) Times a Day., Disp: 180 tablet, Rfl: 3    Multiple Vitamins-Minerals (MULTIVITAMIN ADULT PO), Take  by mouth Daily., Disp: , Rfl:     Omega-3 Fatty Acids (fish oil) 1000 MG capsule capsule, Take  by mouth Daily With Breakfast., Disp: , Rfl:     solifenacin (VESICARE) 5 MG tablet, , Disp: , Rfl:     tamsulosin (FLOMAX) 0.4 MG capsule 24 hr capsule, Take 1 capsule by mouth Daily., Disp: , Rfl:     vitamin B-12 (CYANOCOBALAMIN) 500 MCG tablet, Take 1 tablet by mouth 2 (two) times a day., Disp: , Rfl:     aspirin 81 MG EC tablet, Take 1 tablet by mouth Daily., Disp: 30 tablet, Rfl: 0    atorvastatin (Lipitor) 40 MG tablet, Take 1 tablet by mouth Daily., Disp: 90 tablet, Rfl: 0    ALLERGIES     Penicillins    FAMILY HISTORY       Family History   Problem Relation Age of Onset    Heart disease Mother     Emphysema Mother     Dementia Father     No Known Problems Sister     No Known Problems Sister           SOCIAL HISTORY       Social History     Socioeconomic History    Marital status:    Tobacco Use    Smoking status: Former     Current packs/day: 0.00     Average packs/day: 2.0 packs/day for 15.0 years (30.0 ttl pk-yrs)     Types: Cigarettes     Start date: 1981     Quit date: 1996     Years since quittin.3    Smokeless tobacco: Never    Tobacco comments:     3/11/2016:He quit smoking 20 years ago and was smoking 1 to 2 packs per day for 15-20 years before that.    Vaping Use    Vaping status: Never Used   Substance and Sexual Activity    Alcohol use: No    Drug use: No  "   Sexual activity: Not Currently     Partners: Female         PHYSICAL EXAM    (up to 7 for level 4, 8 or more for level 5)     Vitals:    05/07/24 1606 05/07/24 1730   BP: 123/81 156/77   BP Location: Left arm Left arm   Patient Position: Sitting Sitting   Pulse: 68 78   Resp: 16    Temp: 97.6 °F (36.4 °C)    TempSrc: Oral    SpO2: 95% 96%   Weight: 87.1 kg (192 lb)    Height: 170.2 cm (67\")        Physical Exam  General : Patient is awake, pleasant, alert, oriented, in no acute distress, nontoxic appearing  HEENT: Pupils are equally round, EOMI, conjunctivae clear, there is no injection no icterus.  Oral mucosa is moist, uvula midline  Neck: Neck is supple, full range of motion, trachea midline  Cardiac: Heart regular rate, rhythm, no murmurs, rubs, or gallops  Lungs: Lungs are clear to auscultation, there is no wheezing, rhonchi, or rales. There is no use of accessory muscles  Chest wall: There is no tenderness to palpation over the chest wall or over ribs  Abdomen: Abdomen is soft, nontender, nondistended. There are no firm or pulsatile masses, no rebound rigidity or guarding  Musculoskeletal: 4 out of 5 strength in all 4 extremities.  No focal muscle deficits are appreciated  Neuro: Motor intact, sensory intact, level of consciousness is normal, no flaccid paralysis, no unilateral weakness is appreciated, no focal neurological deficit on examination  Dermatology: Skin is warm and dry        DIAGNOSTIC RESULTS     EKG:  All EKGs are interpreted by the Emergency Department Physician who either signs or Co-signs this chart in the absence of a cardiologist.    No orders to display       RADIOLOGY:     [x] Radiologist's Report Reviewed:  CT Angiogram Neck   Final Result   Impression:   Multifocal areas of severe stenoses noted throughout the left vertebral artery with complete nonocclusion of the left V4 segment. This may represent acute thrombosis, chronic occlusions, dissection or vasculitis. If further " evaluation is warranted,    please consider follow-up with dedicated MRI.      Additional moderate to severe stenoses noted within the distal right cavernous segment ICA secondary to calcified and noncalcified plaques.      Nonspecific mild diffuse wall thickening of the bilateral common carotid arteries and proximal cervical internal carotid arteries. These most likely represent noncalcified plaques, although intramural hematomas and vasculitis are also in the differential    diagnosis.      The remainder of the intracranial vessels are unremarkable.            Electronically Signed: Nando Zhang DO     5/7/2024 6:24 PM EDT     Workstation ID: [id number]      CT Angiogram Head   Final Result   Impression:   Multifocal areas of severe stenoses noted throughout the left vertebral artery with complete nonocclusion of the left V4 segment. This may represent acute thrombosis, chronic occlusions, dissection or vasculitis. If further evaluation is warranted,    please consider follow-up with dedicated MRI.      Additional moderate to severe stenoses noted within the distal right cavernous segment ICA secondary to calcified and noncalcified plaques.      Nonspecific mild diffuse wall thickening of the bilateral common carotid arteries and proximal cervical internal carotid arteries. These most likely represent noncalcified plaques, although intramural hematomas and vasculitis are also in the differential    diagnosis.      The remainder of the intracranial vessels are unremarkable.            Electronically Signed: Nando Zhang,      5/7/2024 6:24 PM EDT     Workstation ID: [id number]          I ordered and independently reviewed the above noted radiographic studies.      I viewed images of CT head which showed no acute intracranial abnormality per my independent interpretation.    See radiologist's dictation for official interpretation.      ED BEDSIDE ULTRASOUND:   Performed by ED Physician - none    LABS:    I  have reviewed and interpreted all of the currently available lab results from this visit (if applicable):  Results for orders placed or performed during the hospital encounter of 05/07/24   Comprehensive Metabolic Panel    Specimen: Blood   Result Value Ref Range    Glucose 129 (H) 65 - 99 mg/dL    BUN 26 (H) 8 - 23 mg/dL    Creatinine 1.49 (H) 0.76 - 1.27 mg/dL    Sodium 139 136 - 145 mmol/L    Potassium 4.1 3.5 - 5.2 mmol/L    Chloride 103 98 - 107 mmol/L    CO2 26.0 22.0 - 29.0 mmol/L    Calcium 9.1 8.6 - 10.5 mg/dL    Total Protein 6.6 6.0 - 8.5 g/dL    Albumin 3.8 3.5 - 5.2 g/dL    ALT (SGPT) 13 1 - 41 U/L    AST (SGOT) 23 1 - 40 U/L    Alkaline Phosphatase 82 39 - 117 U/L    Total Bilirubin 0.6 0.0 - 1.2 mg/dL    Globulin 2.8 gm/dL    A/G Ratio 1.4 g/dL    BUN/Creatinine Ratio 17.4 7.0 - 25.0    Anion Gap 10.0 5.0 - 15.0 mmol/L    eGFR 46.9 (L) >60.0 mL/min/1.73   CBC Auto Differential    Specimen: Blood   Result Value Ref Range    WBC 6.23 3.40 - 10.80 10*3/mm3    RBC 5.06 4.14 - 5.80 10*6/mm3    Hemoglobin 14.7 13.0 - 17.7 g/dL    Hematocrit 44.5 37.5 - 51.0 %    MCV 87.9 79.0 - 97.0 fL    MCH 29.1 26.6 - 33.0 pg    MCHC 33.0 31.5 - 35.7 g/dL    RDW 14.0 12.3 - 15.4 %    RDW-SD 44.3 37.0 - 54.0 fl    MPV 9.2 6.0 - 12.0 fL    Platelets 163 140 - 450 10*3/mm3    Neutrophil % 61.4 42.7 - 76.0 %    Lymphocyte % 24.7 19.6 - 45.3 %    Monocyte % 10.9 5.0 - 12.0 %    Eosinophil % 2.4 0.3 - 6.2 %    Basophil % 0.3 0.0 - 1.5 %    Immature Grans % 0.3 0.0 - 0.5 %    Neutrophils, Absolute 3.82 1.70 - 7.00 10*3/mm3    Lymphocytes, Absolute 1.54 0.70 - 3.10 10*3/mm3    Monocytes, Absolute 0.68 0.10 - 0.90 10*3/mm3    Eosinophils, Absolute 0.15 0.00 - 0.40 10*3/mm3    Basophils, Absolute 0.02 0.00 - 0.20 10*3/mm3    Immature Grans, Absolute 0.02 0.00 - 0.05 10*3/mm3    nRBC 0.0 0.0 - 0.2 /100 WBC        If labs were ordered, I independently reviewed the results and considered them in treating the patient.      EMERGENCY  DEPARTMENT COURSE and DIFFERENTIAL DIAGNOSIS/MDM:   Vitals:  AS OF 19:14 EDT    BP - 156/77  HR - 78  TEMP - 97.6 °F (36.4 °C) (Oral)  O2 SATS - 96%      Orders placed during this visit:  Orders Placed This Encounter   Procedures    CT Angiogram Neck    CT Angiogram Head    Comprehensive Metabolic Panel    CBC Auto Differential    Ambulatory Referral to Neurology    NPO Diet NPO Type: Strict NPO    Telemetry - Place Orders & Notify Provider of Results When Patient Experiences Acute Chest Pain, Dysrhythmia or Respiratory Distress    Notify Provider    Nursing Dysphagia Screening (Complete Prior to Giving Anything By Mouth)    RN to Place Order SLP Consult - Eval & Treat Choosing Reason of RN Dysphagia Screen Failed    Activity As Tolerated    SLP Consult: Eval & Treat Communication Disorder    POC Creatinine    CBC & Differential       All labs have been independently reviewed by me.  All radiology studies have been reviewed by me and the radiologist dictating the report.  All EKG's have been independently viewed and interpreted by me.      Discussion below represents my analysis of pertinent findings related to patient's condition, differential diagnosis, treatment plan and final disposition.    Differential diagnosis:  The differential diagnosis associated with the patient's presentation includes: CVA, tumor, ICH, petit seizure    Additional sources  Discussed/ obtained information from independent historians:   [x] Spouse  [] Parent  [] Family member  [] Friend  [] EMS   [] Other:    External (non-ED) record review:   [] Inpatient record:   [] Office record:   [] Outpatient record:   [] Prior Outpatient labs:   [] Prior Outpatient radiology:   [] Primary Care record:   [] Outside ED record:   [] Other:     Patient's care impacted by:   [] Diabetes  [] Hypertension  [] CHF  [x] Hyperlipidemia  [] Coronary Artery Disease   [] COPD   [] Cancer   [] Tobacco Abuse   [] Substance Abuse    [] Other:     Care  significantly affected by Social Determinants of Health (housing and economic circumstances, unemployment)    [] Yes     [x] No   If yes, Patient's care significantly limited by Social Determinants of Health including:   [] Inadequate housing   [] Low income   [] Alcoholism and drug addiction in family   [] Problems related to primary support group   [] Unemployment   [] Problems related to employment   [] Other Social Determinants of Health:       MEDICATIONS ADMINISTERED IN ED:  Medications   aspirin chewable tablet 81 mg (81 mg Oral Given 5/7/24 1841)     Or   aspirin suppository 300 mg ( Rectal Not Given:  See Alt 5/7/24 1841)   iopamidol (ISOVUE-370) 76 % injection 75 mL (75 mL Intravenous Given 5/7/24 1759)       ED Course as of 05/07/24 1914   Tue May 07, 2024   1751 Stroke clinic follow up one month  Start on aspirin 81mg  Lipitor 40mg daily  Continue workup as outpatient   [AP]      ED Course User Index  [AP] Bjorn Magaña DO         79-year-old male with known history of significant advancing dementia he was had some generalized weakness and had an outpatient MRI with abnormal findings.  Sent here for further assessment.  He is nontoxic-appearing, does not have any signs of large vessel occlusion but does have multiple underlying risk factors.  Further discussion with stroke team will obtain CT angiography of head and neck, MRI from [facility] was reviewed with neurology team.  The patient and wife would prefer to do the remainder of the workup as an outpatient if possible.  CT scan imaging does have appreciatively and atherosclerosis through the head neck.  The patient does have known disease, underlying dementia, patient does have follow-up with neurology, stroke team which he reported was an outpatient with echo and labs.  Was started on aspirin, Lipitor for continued treatment.  They would prefer to do this as an outpatient rather be admitted to the hospital which I feel is reasonable.   Strict return precautions discussed with the patient and his wife at the bedside .    I had a discussion with the patient/family regarding diagnosis, diagnostic results, treatment plan, and medications.  The patient/family indicated understanding of these instructions.  I spent adequate time at the bedside preceding discharge necessary to personally discuss the aftercare instructions, giving patient education, providing explanations of the results of our evaluations/findings, and my decision making to assure that the patient/family understand the plan of care.  Time was allotted to answer questions at that time and throughout the ED course.  Emphasis was placed on timely follow-up after discharge.  I also discussed the potential for the development of an acute emergent condition requiring further evaluation, admission, or even surgical intervention. I encouraged the patient to return to the emergency department immediately for ANY concerns, worsening, new complaints, or if symptoms persist and unable to seek follow-up in a timely fashion.  The patient/family expressed understanding and agreement with this plan.  The patient will follow-up with their PCP in 1-2 days for reevaluation.     PROCEDURES:  Procedures    CRITICAL CARE TIME    Total Critical Care time was 0 minutes, excluding separately reportable procedures.   There was a high probability of clinically significant/life threatening deterioration in the patient's condition which required my urgent intervention.      FINAL IMPRESSION      1. Acute CVA (cerebrovascular accident)    2. Cerebrovascular accident (CVA), unspecified mechanism    3. Candidate for statin therapy due to risk of future cardiovascular event    4. NAFLD (nonalcoholic fatty liver disease)    5. Essential hypertension    6. Abnormal MRI    7. Atherosclerosis    8. Dementia, unspecified dementia severity, unspecified dementia type, unspecified whether behavioral, psychotic, or mood  disturbance or anxiety          DISPOSITION/PLAN     ED Disposition       ED Disposition   Discharge    Condition   Stable    Comment   --               PATIENT REFERRED TO:  Jessica Ruvalcabastacy SALINAS, APRN  [address] [facility]  [address]  [place] [address]  [phone]    Schedule an appointment as soon as possible for a visit       Calvin Coley MD  [address] [facility]  [address]  [place] [id number]  [phone]    In 2 days      [facility] EMERGENCY DEPARTMENT  [address] [facility] [address] [phone]    If symptoms worsen      DISCHARGE MEDICATIONS:     Medication List        START taking these medications      atorvastatin 40 MG tablet  Commonly known as: Lipitor  Take 1 tablet by mouth Daily.            CHANGE how you take these medications      * aspirin 81 MG chewable tablet  What changed: Another medication with the same name was added. Make sure you understand how and when to take each.     * aspirin 81 MG EC tablet  Take 1 tablet by mouth Daily.  What changed: You were already taking a medication with the same name, and this prescription was added. Make sure you understand how and when to take each.           * This list has 2 medication(s) that are the same as other medications prescribed for you. Read the directions carefully, and ask your doctor or other care provider to review them with you.                CONTINUE taking these medications      cholecalciferol 25 MCG (1000 UT) tablet  Commonly known as: VITAMIN D3     clonazePAM 0.5 MG tablet  Commonly known as: KlonoPIN  Give 1/2 to 1 tablet twice a day if needed for anxiety or panic symptoms. Use sparingly. Monitor for falls and sleepiness     donepezil 10 MG tablet  Commonly known as: ARICEPT  Take 1 tablet by mouth Every Night.     finasteride 5 MG tablet  Commonly known as: PROSCAR     fish oil 1000 MG capsule capsule     lisinopril 5 MG tablet  Commonly known as: PRINIVIL,ZESTRIL     MAGNESIUM CITRATE PO      memantine 10 MG tablet  Commonly known as: NAMENDA  Take 1 tablet by mouth 2 (Two) Times a Day.     multivitamin with minerals tablet tablet     solifenacin 5 MG tablet  Commonly known as: VESICARE     tamsulosin 0.4 MG capsule 24 hr capsule  Commonly known as: FLOMAX     vitamin B-12 500 MCG tablet  Commonly known as: CYANOCOBALAMIN               Where to Get Your Medications        These medications were sent to [facility] [id number] - [place], [place] - [address] [place] [address] [address] [place] - [phone]  - [phone] [place] [address]      Phone: [phone]   aspirin 81 MG EC tablet  atorvastatin 40 MG tablet             Comment: Please note this report has been produced using speech recognition software.      Bjorn Magaña DO  Attending Emergency Physician         Bjorn Magaña DO  05/07/24 6221

## 2024-05-07 NOTE — DISCHARGE INSTRUCTIONS
-Start aspirin 81mg daily  -Start lipitor 40mg nightly  -Monitor blood pressure; goal 130-140/80-90  -Need echocardiogram, A1c and Lipid panel as outpatient; will need to be fasting for labs  -Call 911 to return to ED if experience new stroke symptoms (unilateral weakness, numbness, visual disturbances, difficulty speaking, dizziness, sudden severe headache)  -Follow-up in stroke clinic in 1 month (office will call to schedule)  -Keep follow-up with Gisell Marlow for dementia

## 2024-05-08 ENCOUNTER — TELEPHONE (OUTPATIENT)
Dept: NEUROLOGY | Facility: CLINIC | Age: 79
End: 2024-05-08
Payer: MEDICARE

## 2024-07-08 ENCOUNTER — OFFICE VISIT (OUTPATIENT)
Dept: NEUROLOGY | Facility: CLINIC | Age: 79
End: 2024-07-08
Payer: MEDICARE

## 2024-07-08 VITALS
HEART RATE: 68 BPM | WEIGHT: 186 LBS | SYSTOLIC BLOOD PRESSURE: 138 MMHG | BODY MASS INDEX: 29.89 KG/M2 | HEIGHT: 66 IN | OXYGEN SATURATION: 98 % | DIASTOLIC BLOOD PRESSURE: 70 MMHG

## 2024-07-08 DIAGNOSIS — G30.9 MAJOR NEUROCOGNITIVE DISORDER DUE TO ALZHEIMER'S DISEASE, WITH BEHAVIORAL DISTURBANCE: ICD-10-CM

## 2024-07-08 DIAGNOSIS — I63.212 CEREBRAL INFARCTION DUE TO OCCLUSION OF LEFT VERTEBRAL ARTERY: Primary | ICD-10-CM

## 2024-07-08 DIAGNOSIS — Z74.09 IMPAIRED FUNCTIONAL MOBILITY, BALANCE, AND ENDURANCE: ICD-10-CM

## 2024-07-08 DIAGNOSIS — I65.21 STENOSIS OF RIGHT CAROTID ARTERY: ICD-10-CM

## 2024-07-08 DIAGNOSIS — F02.818 MAJOR NEUROCOGNITIVE DISORDER DUE TO ALZHEIMER'S DISEASE, WITH BEHAVIORAL DISTURBANCE: ICD-10-CM

## 2024-07-08 DIAGNOSIS — I67.89 OTHER CEREBROVASCULAR DISEASE: ICD-10-CM

## 2024-07-08 PROBLEM — N40.0 BPH WITH ELEVATED PSA: Status: ACTIVE | Noted: 2024-07-08

## 2024-07-08 PROBLEM — R97.20 BPH WITH ELEVATED PSA: Status: ACTIVE | Noted: 2024-07-08

## 2024-07-08 PROBLEM — N18.30 STAGE 3 CHRONIC KIDNEY DISEASE: Status: ACTIVE | Noted: 2024-07-08

## 2024-07-08 PROBLEM — K44.9 HIATAL HERNIA WITH GERD: Status: ACTIVE | Noted: 2024-07-08

## 2024-07-08 PROBLEM — Z91.81 AT HIGH RISK FOR FALLS: Status: ACTIVE | Noted: 2024-07-08

## 2024-07-08 PROBLEM — I63.9 CVA (CEREBRAL VASCULAR ACCIDENT): Status: ACTIVE | Noted: 2024-07-08

## 2024-07-08 PROBLEM — E78.2 HYPERLIPIDEMIA, MIXED: Status: ACTIVE | Noted: 2024-07-08

## 2024-07-08 PROBLEM — K21.9 HIATAL HERNIA WITH GERD: Status: ACTIVE | Noted: 2024-07-08

## 2024-07-08 PROBLEM — M48.061 LUMBAR STENOSIS WITHOUT NEUROGENIC CLAUDICATION: Status: ACTIVE | Noted: 2024-07-08

## 2024-07-08 PROBLEM — R32 URINARY INCONTINENCE: Status: ACTIVE | Noted: 2024-07-08

## 2024-07-08 PROCEDURE — 1159F MED LIST DOCD IN RCRD: CPT | Performed by: NURSE PRACTITIONER

## 2024-07-08 PROCEDURE — 3078F DIAST BP <80 MM HG: CPT | Performed by: NURSE PRACTITIONER

## 2024-07-08 PROCEDURE — 1160F RVW MEDS BY RX/DR IN RCRD: CPT | Performed by: NURSE PRACTITIONER

## 2024-07-08 PROCEDURE — 3075F SYST BP GE 130 - 139MM HG: CPT | Performed by: NURSE PRACTITIONER

## 2024-07-08 PROCEDURE — 99215 OFFICE O/P EST HI 40 MIN: CPT | Performed by: NURSE PRACTITIONER

## 2024-07-08 RX ORDER — MEMANTINE HYDROCHLORIDE 10 MG/1
10 TABLET ORAL 2 TIMES DAILY
Qty: 180 TABLET | Refills: 3 | Status: SHIPPED | OUTPATIENT
Start: 2024-07-08

## 2024-07-08 RX ORDER — DONEPEZIL HYDROCHLORIDE 10 MG/1
10 TABLET, FILM COATED ORAL NIGHTLY
Qty: 90 TABLET | Refills: 3 | Status: SHIPPED | OUTPATIENT
Start: 2024-07-08

## 2024-07-08 RX ORDER — ASPIRIN 81 MG/1
81 TABLET ORAL DAILY
Qty: 90 TABLET | Refills: 3 | Status: SHIPPED | OUTPATIENT
Start: 2024-07-08

## 2024-07-08 RX ORDER — ATORVASTATIN CALCIUM 40 MG/1
40 TABLET, FILM COATED ORAL NIGHTLY
Qty: 90 TABLET | Refills: 3 | Status: SHIPPED | OUTPATIENT
Start: 2024-07-08

## 2024-07-08 RX ORDER — PANTOPRAZOLE SODIUM 40 MG/1
40 TABLET, DELAYED RELEASE ORAL DAILY
COMMUNITY
Start: 2024-06-19

## 2024-07-08 NOTE — PROGRESS NOTES
Subjective:     Patient ID: Aj Marie is a 79 y.o. male.    CC:   Chief Complaint   Patient presents with    Alzheimer's Disease     HPI:   History of Present Illness  This is an 79-year-old male who presents for 6-month neurology follow-up on Alzheimer's disease with symptoms present since 2019. He is currently on memantine and donepezil for cognitive enhancement. He has impaired balance and has completed physical therapy in the past. He has carpal tunnel syndrome and is wearing wrist splints nightly. He does have hearing loss, which is significant and he wears hearing aids.     Since his last visit to the clinic, he was evaluated at [facility] on 05/07/2024 for concerns of an MRI of the brain showing a subacute stroke, completed outpatient for reports of dizziness. Our [facility] inpatient stroke team did evaluate him in the ER. Due to his dementia, the family felt that it would be best for him to go home with additional outpatient work-up. They recommended he take aspirin 81 mg daily, Lipitor 40 mg daily, and follow up outpatient with the stroke team. His stroke follow-up was missed per his wife. Stroke team recommended completing outpatient stroke work-up with echocardiogram outpatient. His wife reports that he has continued general weakness and gait issues, agitation, anger triggers, and sleeping a lot more. His wife manages medications and finances and drives. He is here for reevaluation of symptoms today. He is accompanied by his wife.    He wife reports that he has not undergone an ultrasound of his heart. She expresses uncertainty regarding the timing of Lipitor administration, expressing concerns about potential side effects due to his persistent muscle weakness and so he has not started this yet. Despite this, he continues to take aspirin. She also mentions that he had left leg issues and has complete physical therapy outpatient. She does not believe it helped with his  balance, but he is stronger. He refuses to use an assistive device.    His wife reports that his memory is ok, but she thinks it is worsening slightly overall. His mood is generally positive, although he occasionally becomes agitated and irritable when his wife assists him. He often becomes frustrated independently, often resorting to cursing, a behavior he has never previously done. He sleeps between 9 to 10 hours per night and remains awake throughout the day. His wife assists him with household chores, such as vacuuming, sweeping, and changing the paris litter. He maintains a healthy diet. His wife expresses concern about his social interactions and apathy. He continues to have his baseline sense of humor.    He has had no recurrent stroke symptoms.    Supplemental Information  He wears hearing aids. He just finished physical therapy for his balance and leg. He has constant urgency to urinate. He is on tamsulosin for his prostate. Sometimes when he feels like he has to go, he does not go, but it still takes negatively affects he and his wife's ability to socialize. He has been referred for pelvic floor PT.    Prior neurological workup and history:  Alzheimer's disease with symptoms present since 2019. He has been on donepezil 10 mg daily along with memantine 10 mg twice per day. He had previously been prescribed mirtazapine but this was discontinued.      He has completed physical therapy. He was given exercises to do at home. His wife states he also gets on their stationary bike 3 times a week. His wife notes he sits a lot during the day. She states he reads mystery books, has puzzle books that he works on, and plays on his phone. His wife states they are trying to stay active. His wife is working part time. He uses a treadmill twice a week.      He does have poor short term memory and he forgets his diagnosis often.     Memory changes noted and reviewed imaging previously with patient and wife on 2/24/2021.   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 3 year college education.     Of note the neurocognitive testing completed at the [facility] 6/30/2021 in summary showed findings consistent with a \"major neurocognitive disorder, mild severity due to early Alzheimer's disease with concomitant white matter changes of the brain along with mild behavioral disturbance\"-per Brittany Flores, PhD.   Recommendations were to manage vascular risk factors as well as discussed additional cognitive enhancers.     He has noted symptoms since at least 2019 marked initially by forgetfulness  and word-finding difficulties . This has gradually worsened  over time. Additional symptoms have included impairments in short term memory . There have been associated  symptoms of anxiety  and agitation . He does have mild impairments in ADL's. His family manages his medications and finances. He only drives to close stores now. He is currently residing at home with his wife. 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     He is forgetful about if he or what he has eaten when she gets home from work. He does drive her to and from work and does not get lost driving locally. He has completed some cognitive therapy visits with speech therapy at [facility]-insurance would not approve additional visits since there was not significant improvement noted.     He is also having some low back issues and has been evaluated with [facility] Neurosurgery 4/2022. No surgical intervention recommended.       Prior evaluation has included a normal MRI of the brain 2019 showing minimal generalized atrophy and 2 punctate chronic small vessel ischemic.    His daughter passed away 9/24/2023.  The following portions of the patient's history were " reviewed and updated as appropriate: allergies, current medications, past family history, past medical history, past social history, past surgical history, and problem list.    Past Medical History:   Diagnosis Date    Chest pain     Chest pain/dyspnea: Considering anginal equivalent and his risk factors, we will evaluate with a stress echocardiogram at his convenience and send a letter with the results and further recommendations.    Difficulty walking 2022    Dyslipidemia     2015:  Total cholesterol 186, triglycerides 147, HDL 37, .ASCVD 41.9%.    First degree atrioventricular block     Chronicity unknown at this time, possibly first told 20 years ago.     Hearing loss     Hyperlipidemia     Hypertension     Major neurocognitive disorder due to Alzheimer's disease, with behavioral disturbance 2022    Memory loss     OA (osteoarthritis)     Stroke        Past Surgical History:   Procedure Laterality Date    HERNIA REPAIR      OTHER SURGICAL HISTORY      Remote skull surgery.    POLYPECTOMY         Social History     Socioeconomic History    Marital status:    Tobacco Use    Smoking status: Former     Current packs/day: 0.00     Average packs/day: 2.0 packs/day for 15.0 years (30.0 ttl pk-yrs)     Types: Cigarettes     Start date: 1981     Quit date: 1996     Years since quittin.5    Smokeless tobacco: Never    Tobacco comments:     3/11/2016:He quit smoking 20 years ago and was smoking 1 to 2 packs per day for 15-20 years before that.    Vaping Use    Vaping status: Never Used   Substance and Sexual Activity    Alcohol use: No    Drug use: No    Sexual activity: Not Currently     Partners: Female       Family History   Problem Relation Age of Onset    Heart disease Mother     Emphysema Mother     Dementia Father     No Known Problems Sister     No Known Problems Sister           Current Outpatient Medications:     aspirin 81 MG EC tablet, Take 1 tablet by mouth Daily.,  "Disp: 90 tablet, Rfl: 3    atorvastatin (Lipitor) 40 MG tablet, Take 1 tablet by mouth Every Night., Disp: 90 tablet, Rfl: 3    cholecalciferol (VITAMIN D3) 25 MCG (1000 UT) tablet, Take 1 tablet by mouth Daily., Disp: , Rfl:     clonazePAM (KlonoPIN) 0.5 MG tablet, Give 1/2 to 1 tablet twice a day if needed for anxiety or panic symptoms. Use sparingly. Monitor for falls and sleepiness, Disp: 30 tablet, Rfl: 0    donepezil (ARICEPT) 10 MG tablet, Take 1 tablet by mouth Every Night., Disp: 90 tablet, Rfl: 3    finasteride (PROSCAR) 5 MG tablet, , Disp: , Rfl:     lisinopril (PRINIVIL,ZESTRIL) 5 MG tablet, Take 2.5 tablets by mouth Daily., Disp: , Rfl:     MAGNESIUM CITRATE PO, Take 500 mg by mouth Every Night., Disp: , Rfl:     memantine (NAMENDA) 10 MG tablet, Take 1 tablet by mouth 2 (Two) Times a Day., Disp: 180 tablet, Rfl: 3    Multiple Vitamins-Minerals (MULTIVITAMIN ADULT PO), Take  by mouth Daily., Disp: , Rfl:     Omega-3 Fatty Acids (fish oil) 1000 MG capsule capsule, Take  by mouth Daily With Breakfast., Disp: , Rfl:     pantoprazole (PROTONIX) 40 MG EC tablet, Take 1 tablet by mouth Daily., Disp: , Rfl:     solifenacin (VESICARE) 5 MG tablet, , Disp: , Rfl:     tamsulosin (FLOMAX) 0.4 MG capsule 24 hr capsule, Take 1 capsule by mouth Daily., Disp: , Rfl:     vitamin B-12 (CYANOCOBALAMIN) 500 MCG tablet, Take 1 tablet by mouth 2 (two) times a day., Disp: , Rfl:     sertraline (Zoloft) 50 MG tablet, Take 1 tablet by mouth Daily., Disp: 90 tablet, Rfl: 3     Review of Systems   Musculoskeletal:  Positive for gait problem.   Neurological:  Positive for weakness.   Psychiatric/Behavioral:  Positive for agitation, behavioral problems and decreased concentration.    All other systems reviewed and are negative.       Objective:  /70   Pulse 68   Ht 167.6 cm (66\")   Wt 84.4 kg (186 lb)   SpO2 98%   BMI 30.02 kg/m²     Neurologic Exam     Mental Status   Oriented to person.   Oriented to place. "   Disoriented to time.   Speech: speech is normal   Level of consciousness: alert  Abnormal comprehension.     Cranial Nerves   Cranial nerves II through XII intact.     Motor Exam   Muscle bulk: normal  Overall muscle tone: normal    Strength   Strength 5/5 throughout.   He is very strong on exam with no focal weakness     Gait, Coordination, and Reflexes     Gait  Gait: wide-based (wide based, waddling, no assistive device)    Coordination   Romberg: negative  Finger to nose coordination: normal  Heel to shin coordination: normal  Tandem walking coordination: abnormal    Tremor   Resting tremor: absent  Intention tremor: absent  Action tremor: absent    Reflexes   Reflexes 2+ except as noted.   Right : 2+  Left : 2+      Physical Exam  Constitutional:       Appearance: Normal appearance.   Neurological:      Mental Status: He is alert.      Cranial Nerves: Cranial nerves 2-12 are intact.      Motor: Motor strength is normal.     Coordination: Finger-Nose-Finger Test, Heel to Shin Test and Romberg Test normal.      Gait: Tandem walk abnormal.   Psychiatric:         Mood and Affect: Mood and affect normal.         Speech: Speech normal.         Behavior: Behavior is slowed.         Cognition and Memory: He exhibits impaired recent memory.      Comments:   Jovial         Results:  Results  Imaging  MRI of the brain without contrast on 5/6/2024 showed a punctate area of diffusion restriction in the posterior cerebral hemisphere, presumably an area of acute to subacute stroke, extensive white matter changes throughout.   5/7/2024-CTA of the neck at [facility] showed multifocal areas of severe stenosis throughout the left vertebral artery with complete nonocclusion of the left V4 segment, moderate to severe stenosis within the distal right cavernous segment ICA secondary to calcification and plaques. CTA of the head showed the same findings.    Testing  Pray cognitive assessment score: 20 out of 30, 0  out of 5 for word recall, disoriented to time.  Prior Cognitive Testing MMSE 25/30    Assessment/Plan:     Diagnoses and all orders for this visit:    1. 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  Dispense: 90 tablet; Refill: 3  -     atorvastatin (Lipitor) 40 MG tablet; Take 1 tablet by mouth Every Night.  Dispense: 90 tablet; Refill: 3  -     Adult Transthoracic Echo Complete W/ Cont if Necessary Per Protocol; Future    2. Major neurocognitive disorder due to Alzheimer's disease, with behavioral disturbance  Comments:  mild to moderate, progressing  Orders:  -     donepezil (ARICEPT) 10 MG tablet; Take 1 tablet by mouth Every Night.  Dispense: 90 tablet; Refill: 3  -     memantine (NAMENDA) 10 MG tablet; Take 1 tablet by mouth 2 (Two) Times a Day.  Dispense: 180 tablet; Refill: 3  -     sertraline (Zoloft) 50 MG tablet; Take 1 tablet by mouth Daily.  Dispense: 90 tablet; Refill: 3    3. Stenosis of right carotid artery  -     aspirin 81 MG EC tablet; Take 1 tablet by mouth Daily.  Dispense: 90 tablet; Refill: 3  -     atorvastatin (Lipitor) 40 MG tablet; Take 1 tablet by mouth Every Night.  Dispense: 90 tablet; Refill: 3    4. Other cerebrovascular disease  -     Adult Transthoracic Echo Complete W/ Cont if Necessary Per Protocol; Future    5. Impaired functional mobility, balance, and endurance  Comments:  just completed physical therapy, refuses to use assistive devices, falls prevention           Assessment & Plan  Total time of visit today was 42 minutes which including reviewing ER records, Stroke consult records, imaging, obtaining history from patient and wife, completing exam, discussing findings and recommendations in detail moving forward. Printed off after visit summary with Alzheimer's caregiver guide and medication instructions.    Fits criteria for mild to moderate Alzheimer's disease with progression. His  sense of humor remains robust, albeit with episodes of agitation and anger. The initiation of atorvastatin 40 mg nightly will be initiated, provided he tolerates this well, then he will begin a daily regimen of sertraline, starting with 25 mg daily for a period of 2 weeks, followed by a further increase to 50 mg daily. Concurrently, he will continue with aspirin, Namenda, and donepezil. Outpatient ECHO ordered.    Follow-up  A follow-up appointment is scheduled for 11/2024 for a re-evaluation of symptoms, followed by a 6-month follow-up thereafter. He and his wife have expressed understanding and agreement with this plan today.    Reviewed medications, potential side effects and signs and symptoms to report. Discussed risk versus benefits of treatment plan with patient and/or family-including medications, labs and radiology that may be ordered. Addressed questions and concerns during visit. Patient and/or family verbalized understanding and agree with plan.    Discussed signs and symptoms of stroke and when to call 911. Instructed to follow a low fat diet including the Mediterranean diet. Instructed to take all medications daily as prescribed. Encouraged 30 minutes of exercise 3-4 times a week as tolerated. Stay well hydrated. Discussed potential side effects of new medications and signs and symptoms to report. If patient is currently using tobacco products, smoking cessation was encouraged. Reviewed stroke risk factors and stroke prevention plan. Patient and/or family verbalizes understanding and agrees with plan.     During this visit the following were done:  Labs Reviewed [x]    Labs Ordered []    Radiology Reports Reviewed [x]    Radiology Ordered []    PCP Records Reviewed []    Referring Provider Records Reviewed []    ER Records Reviewed [x]    Hospital Records Reviewed [x]    History Obtained From Family [x]    Radiology Images Reviewed []    Other Reviewed [x]    Records Requested []      07/08/24   10:17  EDT    Patient or patient representative verbalized consent for the use of Ambient Listening during the visit with  TAMERA Hooper for chart documentation. 7/8/2024  13:16 EDT    Note to patient: The 21st Century Cures Act makes medical notes like these available to patients in the interest of transparency. However, be advised this is a medical document. It is intended as peer to peer communication. It is written in medical language and may contain abbreviations or verbiage that are unfamiliar. It may appear blunt or direct. Medical documents are intended to carry relevant information, facts as evident, and the clinical opinion of the provider.

## 2024-07-08 NOTE — PATIENT INSTRUCTIONS
Sertraline 50mg-take 1/2 tablet daily for 2 weeks then 1 tablet daily and continue.    Start atorvastatin 40mg nightly.     Continue aspirin 81mg daily.

## 2024-07-11 ENCOUNTER — PATIENT ROUNDING (BHMG ONLY) (OUTPATIENT)
Dept: NEUROLOGY | Facility: CLINIC | Age: 79
End: 2024-07-11
Payer: MEDICARE

## 2024-07-11 NOTE — PROGRESS NOTES
A My-Chart message has been sent to the patient for PATIENT ROUNDING with [facility]

## 2024-09-24 DIAGNOSIS — F02.818 MAJOR NEUROCOGNITIVE DISORDER DUE TO ALZHEIMER'S DISEASE, WITH BEHAVIORAL DISTURBANCE: ICD-10-CM

## 2024-09-24 DIAGNOSIS — G30.9 MAJOR NEUROCOGNITIVE DISORDER DUE TO ALZHEIMER'S DISEASE, WITH BEHAVIORAL DISTURBANCE: ICD-10-CM

## 2024-09-24 RX ORDER — DONEPEZIL HYDROCHLORIDE 10 MG/1
10 TABLET, FILM COATED ORAL NIGHTLY
Qty: 90 TABLET | Refills: 3 | Status: SHIPPED | OUTPATIENT
Start: 2024-09-24

## 2024-09-24 RX ORDER — MEMANTINE HYDROCHLORIDE 10 MG/1
10 TABLET ORAL 2 TIMES DAILY
Qty: 180 TABLET | Refills: 3 | Status: SHIPPED | OUTPATIENT
Start: 2024-09-24

## 2024-10-23 ENCOUNTER — HOSPITAL ENCOUNTER (OUTPATIENT)
Facility: HOSPITAL | Age: 79
Discharge: HOME | End: 2024-10-23
Admitting: NURSE PRACTITIONER
Payer: MEDICARE

## 2024-10-23 VITALS — WEIGHT: 186 LBS | BODY MASS INDEX: 29.89 KG/M2 | HEIGHT: 66 IN

## 2024-10-23 DIAGNOSIS — I63.212 CEREBRAL INFARCTION DUE TO OCCLUSION OF LEFT VERTEBRAL ARTERY: ICD-10-CM

## 2024-10-23 DIAGNOSIS — I67.89 OTHER CEREBROVASCULAR DISEASE: ICD-10-CM

## 2024-10-23 LAB
ASCENDING AORTA: 3.6 CM
BH CV ECHO MEAS - AO MAX PG: 10.6 MMHG
BH CV ECHO MEAS - AO MEAN PG: 5 MMHG
BH CV ECHO MEAS - AO ROOT DIAM: 3.3 CM
BH CV ECHO MEAS - AO V2 MAX: 163 CM/SEC
BH CV ECHO MEAS - AO V2 VTI: 41.1 CM
BH CV ECHO MEAS - AVA(I,D): 2.18 CM2
BH CV ECHO MEAS - EDV(CUBED): 91.1 ML
BH CV ECHO MEAS - EDV(MOD-SP2): 136 ML
BH CV ECHO MEAS - EDV(MOD-SP4): 127 ML
BH CV ECHO MEAS - EF(MOD-BP): 59.9 %
BH CV ECHO MEAS - EF(MOD-SP2): 61.8 %
BH CV ECHO MEAS - EF(MOD-SP4): 58.2 %
BH CV ECHO MEAS - ESV(CUBED): 29.8 ML
BH CV ECHO MEAS - ESV(MOD-SP2): 52 ML
BH CV ECHO MEAS - ESV(MOD-SP4): 53.1 ML
BH CV ECHO MEAS - FS: 31.1 %
BH CV ECHO MEAS - IVS/LVPW: 1.38 CM
BH CV ECHO MEAS - IVSD: 1.1 CM
BH CV ECHO MEAS - LA DIMENSION: 3.5 CM
BH CV ECHO MEAS - LAT PEAK E' VEL: 10.1 CM/SEC
BH CV ECHO MEAS - LV DIASTOLIC VOL/BSA (35-75): 65.5 CM2
BH CV ECHO MEAS - LV MASS(C)D: 142.9 GRAMS
BH CV ECHO MEAS - LV MAX PG: 4 MMHG
BH CV ECHO MEAS - LV MEAN PG: 2 MMHG
BH CV ECHO MEAS - LV SYSTOLIC VOL/BSA (12-30): 27.4 CM2
BH CV ECHO MEAS - LV V1 MAX: 99.6 CM/SEC
BH CV ECHO MEAS - LV V1 VTI: 25.9 CM
BH CV ECHO MEAS - LVIDD: 4.5 CM
BH CV ECHO MEAS - LVIDS: 3.1 CM
BH CV ECHO MEAS - LVOT AREA: 3.5 CM2
BH CV ECHO MEAS - LVOT DIAM: 2.1 CM
BH CV ECHO MEAS - LVPWD: 0.8 CM
BH CV ECHO MEAS - MED PEAK E' VEL: 6.1 CM/SEC
BH CV ECHO MEAS - MV A MAX VEL: 104 CM/SEC
BH CV ECHO MEAS - MV DEC TIME: 0.29 SEC
BH CV ECHO MEAS - MV E MAX VEL: 73.9 CM/SEC
BH CV ECHO MEAS - MV E/A: 0.71
BH CV ECHO MEAS - MV MAX PG: 4.9 MMHG
BH CV ECHO MEAS - MV MEAN PG: 1 MMHG
BH CV ECHO MEAS - MV V2 VTI: 41 CM
BH CV ECHO MEAS - MVA(VTI): 2.19 CM2
BH CV ECHO MEAS - PA ACC TIME: 0.12 SEC
BH CV ECHO MEAS - SV(LVOT): 89.7 ML
BH CV ECHO MEAS - SV(MOD-SP2): 84 ML
BH CV ECHO MEAS - SV(MOD-SP4): 73.9 ML
BH CV ECHO MEAS - SVI(LVOT): 46.3 ML/M2
BH CV ECHO MEAS - SVI(MOD-SP2): 43.3 ML/M2
BH CV ECHO MEAS - SVI(MOD-SP4): 38.1 ML/M2
BH CV ECHO MEAS - TAPSE (>1.6): 2.6 CM
BH CV ECHO MEASUREMENTS AVERAGE E/E' RATIO: 9.12
BH CV VAS BP LEFT ARM: NORMAL MMHG
BH CV XLRA - RV BASE: 3 CM
BH CV XLRA - TDI S': 11.9 CM/SEC
LEFT ATRIUM VOLUME INDEX: 16.5 ML/M2

## 2024-10-23 PROCEDURE — 93306 TTE W/DOPPLER COMPLETE: CPT | Performed by: INTERNAL MEDICINE

## 2024-10-23 PROCEDURE — 93306 TTE W/DOPPLER COMPLETE: CPT

## 2024-10-25 ENCOUNTER — TELEPHONE (OUTPATIENT)
Dept: NEUROLOGY | Facility: CLINIC | Age: 79
End: 2024-10-25
Payer: MEDICARE

## 2024-10-25 NOTE — PROGRESS NOTES
Please notify Mr. Marie's wife that his Echo of his heart does show some moderate plaque and valve changes. This is common to see in aging. He is on cholesterol medicine and aspirin. Would his wife like a referral to Cardiology for further review of testing? This is entirely up to her. Let me know. Thanks, TAMERA Andre

## 2024-10-25 NOTE — TELEPHONE ENCOUNTER
----- Message from Gisell Marlow sent at 10/25/2024  8:29 AM EDT -----  Please notify Mr. Marie's wife that his Echo of his heart does show some moderate plaque and valve changes. This is common to see in aging. He is on cholesterol medicine and aspirin. Would his wife like a referral to Cardiology for further review of testing? This is entirely up to her. Let me know. Thanks, TAMERA Andre

## 2024-11-26 ENCOUNTER — OFFICE VISIT (OUTPATIENT)
Dept: NEUROLOGY | Facility: CLINIC | Age: 79
End: 2024-11-26
Payer: MEDICARE

## 2024-11-26 VITALS
DIASTOLIC BLOOD PRESSURE: 62 MMHG | HEART RATE: 67 BPM | HEIGHT: 66 IN | OXYGEN SATURATION: 96 % | BODY MASS INDEX: 29.67 KG/M2 | SYSTOLIC BLOOD PRESSURE: 124 MMHG | WEIGHT: 184.6 LBS

## 2024-11-26 DIAGNOSIS — M48.061 LUMBAR STENOSIS WITHOUT NEUROGENIC CLAUDICATION: ICD-10-CM

## 2024-11-26 DIAGNOSIS — G30.9 MAJOR NEUROCOGNITIVE DISORDER DUE TO ALZHEIMER'S DISEASE, WITH BEHAVIORAL DISTURBANCE: Primary | ICD-10-CM

## 2024-11-26 DIAGNOSIS — I65.21 STENOSIS OF RIGHT CAROTID ARTERY: ICD-10-CM

## 2024-11-26 DIAGNOSIS — R53.1 WEAKNESS: ICD-10-CM

## 2024-11-26 DIAGNOSIS — I63.212 CEREBRAL INFARCTION DUE TO OCCLUSION OF LEFT VERTEBRAL ARTERY: ICD-10-CM

## 2024-11-26 DIAGNOSIS — F02.818 MAJOR NEUROCOGNITIVE DISORDER DUE TO ALZHEIMER'S DISEASE, WITH BEHAVIORAL DISTURBANCE: Primary | ICD-10-CM

## 2024-11-26 DIAGNOSIS — Z74.09 IMPAIRED FUNCTIONAL MOBILITY, BALANCE, AND ENDURANCE: ICD-10-CM

## 2024-11-26 DIAGNOSIS — R26.89 ABNORMALITY OF GAIT DUE TO IMPAIRMENT OF BALANCE: ICD-10-CM

## 2024-11-26 NOTE — PROGRESS NOTES
Subjective:     Patient ID: Aj Marie is a 79 y.o. male.    CC:   Chief Complaint   Patient presents with    Alzheimer's Disease    Gait Problem    Stroke     HPI:   History of Present Illness  This is an 79-year-old male presenting for a 4-month neurology follow-up on Alzheimer's disease with symptoms present since 2019. He is currently on memantine and donepezil long term for cognitive enhancement. At his last visit to the clinic, additional testing for a cerebrovascular accident was being completed. He underwent an echocardiogram with [facility] on 10/23/2024 that showed left ventricular ejection fraction of 56 to 60%, moderate calcification of the aortic valve, mild aortic valve regurgitation, and moderate mitral annular calcification present. He is currently on aspirin 81 mg daily and atorvastatin 40 mg. A cardiology consultation was offered, but his family declined. He is accompanied by his wife.    His wife notes that he continues to have leg weakness, balance issues, muscle weakness, sleeping about 10 hours a day, and very little motivation to exercise. She manages his finances, appointments, and driving. His wife expresses concern regarding his leg weakness, particularly when he rises from a seated position, necessitating support from surrounding objects. He reports no dizziness, unsteadiness, or lightheadedness but acknowledges difficulty in maintaining strength in his hips and legs. He also reports no numbness, tingling, or loss of sensation in his legs. His wife recalls a previous diagnosis of lumbar spinal stenosis, which was managed non-surgically. He has been engaging in exercises under his wife's supervision. He has not had any recent falls since July 2024 but had a near-fall incident at [employer] on Sunday. He uses a cane for support during long walks or grocery shopping. He reports no numbness or loss of sensation in his genital area. He has been experiencing urinary incontinence, which  is not a new symptom. His wife has been encouraging him to use incontinence pads, but he has been resistant. He has been prescribed medication to manage urinary urgency. He has been taking tamsulosin for prostate issues. His wife reports a decrease in his appetite and a preference for unhealthy food choices. Gait has worsened since stroke in May 2024.    He is currently on sertraline 50 mg daily for apathy and lack of motivation and this is well tolerated, and clonazepam is prescribed as needed, although he has not required the latter. His wife reports a sudden onset of swearing about a month ago, which has since resolved spontaneously.    Supplemental Information  He has been referred to a hand specialist due to occasional loss of sensation in his hands, with potential carpal tunnel syndrome being considered.    Prior neurological workup and history:  Alzheimer's disease with symptoms present since 2019. He has been on donepezil 10 mg daily along with memantine 10 mg twice per day. He had previously been prescribed mirtazapine but this was discontinued.      He has completed physical therapy. He was given exercises to do at home. His wife states he also gets on their stationary bike 3 times a week. His wife notes he sits a lot during the day. She states he reads mystery books, has puzzle books that he works on, and plays on his phone. His wife states they are trying to stay active. His wife is working part time. He uses a treadmill twice a week.      He does have poor short term memory and he forgets his diagnosis often.     Memory changes noted and reviewed imaging previously with patient and wife on 2/24/2021.  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 3 year college education.     Of note the neurocognitive testing completed at the [facility]  "6/30/2021 in summary showed findings consistent with a \"major neurocognitive disorder, mild severity due to early Alzheimer's disease with concomitant white matter changes of the brain along with mild behavioral disturbance\"-per Brittany Flores, PhD.   Recommendations were to manage vascular risk factors as well as discussed additional cognitive enhancers.     He has noted symptoms since at least 2019 marked initially by forgetfulness  and word-finding difficulties . This has gradually worsened  over time. Additional symptoms have included impairments in short term memory . There have been associated  symptoms of anxiety  and agitation . He does have mild impairments in ADL's. His family manages his medications and finances. He only drives to close stores now. He is currently residing at home with his wife. 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     He is forgetful about if he or what he has eaten when she gets home from work. He does drive her to and from work and does not get lost driving locally. He has completed some cognitive therapy visits with speech therapy at [facility]-insurance would not approve additional visits since there was not significant improvement noted.     He is also having some low back issues and has been evaluated with [facility] Neurosurgery 4/2022. No surgical intervention recommended.       Prior evaluation has included a normal MRI of the brain 2019 showing minimal generalized atrophy and 2 punctate chronic small vessel ischemic.     His daughter passed away 9/24/2023.    [facility] Emergency Department on 05/07/2024 for concerns of an MRI of the brain showing a subacute stroke, completed outpatient for reports of dizziness. Our [facility] inpatient stroke team did evaluate him in the ER. Due to his dementia, the family felt that it would be best for him to go home with additional outpatient work-up. They recommended he take " aspirin 81 mg daily, Lipitor 40 mg daily, and follow up outpatient with the stroke team. His stroke follow-up was missed per his wife. Stroke team recommended completing outpatient stroke work-up with echocardiogram outpatient. His wife reports that he has continued general weakness and gait issues, agitation, anger triggers, and sleeping a lot more. His wife manages medications and finances and drives.      MRI of the brain without contrast on 5/6/2024 showed a punctate area of diffusion restriction in the posterior cerebral hemisphere, presumably an area of acute to subacute stroke, extensive white matter changes throughout.   5/7/2024-CTA of the neck at [facility] showed multifocal areas of severe stenosis throughout the left vertebral artery with complete nonocclusion of the left V4 segment, moderate to severe stenosis within the distal right cavernous segment ICA secondary to calcification and plaques. CTA of the head showed the same findings.    His wife expresses concern about his social interactions and apathy. He continues to have his baseline sense of humor.     He has impaired balance and has completed physical therapy in the past. He has carpal tunnel syndrome and is wearing wrist splints nightly. He does have hearing loss, which is significant and he wears hearing aids.     Known moderate to severe lumbar spinal stenosis diagnosed in 2018. Evaluated by Dr. Spencer Ricketts with [facility]. Surgery was offered, however, due to his memory loss, conservative therapy was chosen. Not interested in injections.    The following portions of the patient's history were reviewed and updated as appropriate: allergies, current medications, past family history, past medical history, past social history, past surgical history, and problem list.    Past Medical History:   Diagnosis Date    Chest pain     Chest pain/dyspnea: Considering anginal equivalent and his risk factors, we will evaluate with a stress  echocardiogram at his convenience and send a letter with the results and further recommendations.    Difficulty walking 2022    Dyslipidemia     2015:  Total cholesterol 186, triglycerides 147, HDL 37, .ASCVD 41.9%.    First degree atrioventricular block     Chronicity unknown at this time, possibly first told 20 years ago.     Head injury     Hearing loss     Hyperlipidemia     Hypertension     Major neurocognitive disorder due to Alzheimer's disease, with behavioral disturbance 2022    Memory loss     OA (osteoarthritis)     Stroke        Past Surgical History:   Procedure Laterality Date    HERNIA REPAIR      OTHER SURGICAL HISTORY      Remote skull surgery.    POLYPECTOMY         Social History     Socioeconomic History    Marital status:    Tobacco Use    Smoking status: Former     Current packs/day: 0.00     Average packs/day: 2.0 packs/day for 15.0 years (30.0 ttl pk-yrs)     Types: Cigarettes     Start date: 1981     Quit date: 1996     Years since quittin.9    Smokeless tobacco: Never    Tobacco comments:     3/11/2016:He quit smoking 20 years ago and was smoking 1 to 2 packs per day for 15-20 years before that.    Vaping Use    Vaping status: Never Used   Substance and Sexual Activity    Alcohol use: No    Drug use: No    Sexual activity: Not Currently     Partners: Female       Family History   Problem Relation Age of Onset    Heart disease Mother     Emphysema Mother     Dementia Father     No Known Problems Sister     No Known Problems Sister           Current Outpatient Medications:     aspirin 81 MG EC tablet, Take 1 tablet by mouth Daily., Disp: 90 tablet, Rfl: 3    atorvastatin (Lipitor) 40 MG tablet, Take 1 tablet by mouth Every Night., Disp: 90 tablet, Rfl: 3    cholecalciferol (VITAMIN D3) 25 MCG (1000 UT) tablet, Take 1 tablet by mouth Daily., Disp: , Rfl:     clonazePAM (KlonoPIN) 0.5 MG tablet, Give 1/2 to 1 tablet twice a day if needed for anxiety  "or panic symptoms. Use sparingly. Monitor for falls and sleepiness, Disp: 30 tablet, Rfl: 0    donepezil (ARICEPT) 10 MG tablet, Take 1 tablet by mouth Every Night., Disp: 90 tablet, Rfl: 3    finasteride (PROSCAR) 5 MG tablet, , Disp: , Rfl:     lisinopril (PRINIVIL,ZESTRIL) 5 MG tablet, Take 2.5 tablets by mouth Daily., Disp: , Rfl:     MAGNESIUM CITRATE PO, Take 500 mg by mouth Every Night., Disp: , Rfl:     memantine (NAMENDA) 10 MG tablet, Take 1 tablet by mouth 2 (Two) Times a Day., Disp: 180 tablet, Rfl: 3    Multiple Vitamins-Minerals (MULTIVITAMIN ADULT PO), Take  by mouth Daily., Disp: , Rfl:     Omega-3 Fatty Acids (fish oil) 1000 MG capsule capsule, Take  by mouth Daily With Breakfast., Disp: , Rfl:     pantoprazole (PROTONIX) 40 MG EC tablet, Take 1 tablet by mouth Daily., Disp: , Rfl:     sertraline (Zoloft) 50 MG tablet, Take 1 tablet by mouth Daily., Disp: 90 tablet, Rfl: 3    solifenacin (VESICARE) 5 MG tablet, , Disp: , Rfl:     tamsulosin (FLOMAX) 0.4 MG capsule 24 hr capsule, Take 1 capsule by mouth Daily., Disp: , Rfl:     vitamin B-12 (CYANOCOBALAMIN) 500 MCG tablet, Take 1 tablet by mouth 2 (two) times a day., Disp: , Rfl:      Review of Systems   Constitutional:  Positive for activity change.   Musculoskeletal:  Positive for arthralgias, back pain and gait problem.   Neurological:  Positive for weakness. Negative for dizziness, light-headedness, numbness and headaches.   Psychiatric/Behavioral:  Positive for agitation, confusion, decreased concentration and dysphoric mood. Negative for sleep disturbance. The patient is nervous/anxious.    All other systems reviewed and are negative.       Objective:  /62   Pulse 67   Ht 167.6 cm (66\")   Wt 83.7 kg (184 lb 9.6 oz)   SpO2 96%   BMI 29.80 kg/m²     Neurological Exam  Mental Status  Alert. Oriented only to person, place and time. At 5 minutes (0/5) Unable to copy figure. Clock drawing is normal. Speech is normal. Language is fluent " with no aphasia. Unable to perform serial calculations. Fund of knowledge is abnormal. Apraxia absent. mild.    Cranial Nerves  CN II: Visual acuity is normal. Visual fields full to confrontation.  CN III, IV, VI: Extraocular movements intact bilaterally. Normal lids and orbits bilaterally. Pupils equal round and reactive to light bilaterally.  CN V: Facial sensation is normal.  CN VII: Full and symmetric facial movement.  CN VIII: Severely hard of hearing with bilateral hearing aids.  CN IX, X: Palate elevates symmetrically. Normal gag reflex.  CN XI: Shoulder shrug strength is normal.  CN XII: Tongue midline without atrophy or fasciculations.    Motor  Normal muscle bulk throughout. No fasciculations present. Normal muscle tone. The following abnormal movements were seen: BUE moderate kinetic hand tremors, no resting tremors.   Strength is 5/5 throughout all four extremities.    Reported weakness in legs with ambulation, no atrophy.    Sensory  Light touch is normal in upper and lower extremities. Pinprick is normal in upper and lower extremities. Temperature is normal in upper and lower extremities. Vibration abnormality: Decreased BLE.     Reflexes  Deep tendon reflexes are 2+ and symmetric except as noted.  Right Plantar: downgoing  Left Plantar: downgoing  Deep tendon reflexes: 3+ more hyperreflexia today noted in all 4 extremities.    Right pathological reflexes: Herman's absent. Ankle clonus absent.  Left pathological reflexes: Herman's absent. Ankle clonus absent.    Coordination    Finger-to-nose, rapid alternating movements and heel-to-shin normal bilaterally without dysmetria.    Gait  Casual gait: Wide stance. Reduced stride length. Spastic gait. Favoring left side with antalgia also noted, wife reports this has been progressively worsening since stroke.Guarded.. Romberg is absent. Normal pull test. Able to rise from chair without using arms.    Physical Exam  Constitutional:       Appearance:  Normal appearance.   Eyes:      General: Lids are normal.      Extraocular Movements: Extraocular movements intact.      Pupils: Pupils are equal, round, and reactive to light.   Neurological:      Mental Status: He is alert.      Motor: Motor strength is normal.     Coordination: Coordination is intact. Romberg sign negative.   Psychiatric:         Mood and Affect: Mood is anxious.         Speech: Speech normal.         Behavior: Behavior is slowed.         Cognition and Memory: He exhibits impaired recent memory.         Judgment: Judgment is impulsive and inappropriate.      Comments:   Jovial       Results:  Results  Imaging  Echocardiogram showed left ventricular ejection fraction of 56 to 60%, moderate calcification of the aortic valve, mild aortic valve regurgitation and moderate mitral annular calcification present.    Testing  Jesup cognitive assessment score is a 22 out of 30, 0 out of 5 for word recall, continues to have significant amnestic recall.  Prior MOCA 20/30    Assessment/Plan:     Diagnoses and all orders for this visit:    1. Major neurocognitive disorder due to Alzheimer's disease, with behavioral disturbance (Primary)  -     sertraline (Zoloft) 50 MG tablet; Take 1 tablet by mouth Daily.  Dispense: 90 tablet; Refill: 3    2. 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 Lumbar stenosis without neurogenic claudication  -     Ambulatory Referral to Physical Therapy for Evaluation & Treatment    4. Impaired functional mobility, balance, and endurance  -     Ambulatory Referral to Physical Therapy for Evaluation & Treatment    5. Weakness  -     CK; Future  -     Ambulatory Referral to Physical Therapy for Evaluation & Treatment    6. Abnormality of gait due to impairment of balance  -     Ambulatory Referral to Physical Therapy for Evaluation & Treatment    7. Stenosis of right carotid artery  Comments:  continue aspirin and statin  therapy           Assessment & Plan  1. Alzheimer's Disease.  He has been exhibiting symptoms since 2019 and is currently on memantine and donepezil for cognitive enhancement. His Niles Cognitive Assessment score is 22 out of 30, indicating significant amnestic recall issues. His wife continues to manage finances, appointments, and driving. He will continue his current medications for cognitive enhancement. No refills needed today.    2. Cerebrovascular Accident.  He had a stroke in May 2024. An echocardiogram on 10/23/2024 showed a left ventricular ejection fraction of 56 to 60%, moderate calcification of the aortic valve, mild aortic valve regurgitation, and moderate mitral annular calcification. He is currently on aspirin 81 mg daily and atorvastatin 40 mg. He has not had any recent falls but reports increased weakness in his lower extremities. He will continue his current medications to prevent another stroke. A CK level test will be ordered to check for elevated muscle enzymes, which could indicate issues with atorvastatin. He will be referred to physical therapy for gait and balance training. If the CK level is abnormal, the statin will be reevaluated.    3. Lumbar Spinal Stenosis.  He has a history of moderate to severe lumbar spinal stenosis, which could be contributing to his leg weakness and balance issues. He has not undergone recent physical therapy recently. He will be referred to physical therapy to work on strengthening his legs and improving balance. If he continues to have significant hip issues, an x-ray of his hip will be considered in consultation with Dr. Coley his PCP.    4. Carpal Tunnel Syndrome.  He reports occasional loss of feeling in his hands. He has been referred to a hand specialist for potential injections to address this issue.    5. Mood Disorder.  He is taking sertraline 50 mg daily, which has improved his mood. He has not required any clonazepam. He will continue his current  medication regimen.    Follow-up  The patient will follow up in July 2025 or sooner if needed.    Reviewed medications, potential side effects and signs and symptoms to report. Discussed risk versus benefits of treatment plan with patient and/or family-including medications, labs and radiology that may be ordered. Addressed questions and concerns during visit. Patient and/or family verbalized understanding and agree with plan.    During this visit the following were done:  Labs Reviewed []    Labs Ordered []    Radiology Reports Reviewed [x]    Radiology Ordered []    PCP Records Reviewed []    Referring Provider Records Reviewed []    ER Records Reviewed []    Hospital Records Reviewed []    History Obtained From Family [x]    Radiology Images Reviewed []    Other Reviewed [x]    Records Requested []      11/26/24   16:15 EST    Patient or patient representative verbalized consent for the use of Ambient Listening during the visit with  TAMERA Hooper for chart documentation. 11/26/2024  16:57 EST    Note to patient: The 21st Century Cures Act makes medical notes like these available to patients in the interest of transparency. However, be advised this is a medical document. It is intended as peer to peer communication. It is written in medical language and may contain abbreviations or verbiage that are unfamiliar. It may appear blunt or direct. Medical documents are intended to carry relevant information, facts as evident, and the clinical opinion of the provider.

## 2024-11-26 NOTE — LETTER
November 26, 2024     Calvin Coley MD  [address] [address]    Patient: Aj Marie   YOB: 1945   Date of Visit: 11/26/2024       Dear Calvin Coley MD    Aj Marie was in my office today. Below is a copy of my note.    If you have questions, please do not hesitate to call me. I look forward to following Aj along with you.         Sincerely,        TAMERA Hooper        CC: No Recipients    Subjective:     Patient ID: Aj Marie is a 79 y.o. male.    CC:   Chief Complaint   Patient presents with   • Alzheimer's Disease   • Gait Problem   • Stroke     HPI:   History of Present Illness  This is an 79-year-old male presenting for a 4-month neurology follow-up on Alzheimer's disease with symptoms present since 2019. He is currently on memantine and donepezil long term for cognitive enhancement. At his last visit to the clinic, additional testing for a cerebrovascular accident was being completed. He underwent an echocardiogram with [facility] on 10/23/2024 that showed left ventricular ejection fraction of 56 to 60%, moderate calcification of the aortic valve, mild aortic valve regurgitation, and moderate mitral annular calcification present. He is currently on aspirin 81 mg daily and atorvastatin 40 mg. A cardiology consultation was offered, but his family declined. He is accompanied by his wife.    His wife notes that he continues to have leg weakness, balance issues, muscle weakness, sleeping about 10 hours a day, and very little motivation to exercise. She manages his finances, appointments, and driving. His wife expresses concern regarding his leg weakness, particularly when he rises from a seated position, necessitating support from surrounding objects. He reports no dizziness, unsteadiness, or lightheadedness but acknowledges difficulty in maintaining strength in his hips and legs. He also reports no numbness, tingling, or loss of  sensation in his legs. His wife recalls a previous diagnosis of lumbar spinal stenosis, which was managed non-surgically. He has been engaging in exercises under his wife's supervision. He has not had any recent falls since July 2024 but had a near-fall incident at [employer] on Sunday. He uses a cane for support during long walks or grocery shopping. He reports no numbness or loss of sensation in his genital area. He has been experiencing urinary incontinence, which is not a new symptom. His wife has been encouraging him to use incontinence pads, but he has been resistant. He has been prescribed medication to manage urinary urgency. He has been taking tamsulosin for prostate issues. His wife reports a decrease in his appetite and a preference for unhealthy food choices. Gait has worsened since stroke in May 2024.    He is currently on sertraline 50 mg daily for apathy and lack of motivation and this is well tolerated, and clonazepam is prescribed as needed, although he has not required the latter. His wife reports a sudden onset of swearing about a month ago, which has since resolved spontaneously.    Supplemental Information  He has been referred to a hand specialist due to occasional loss of sensation in his hands, with potential carpal tunnel syndrome being considered.    Prior neurological workup and history:  Alzheimer's disease with symptoms present since 2019. He has been on donepezil 10 mg daily along with memantine 10 mg twice per day. He had previously been prescribed mirtazapine but this was discontinued.      He has completed physical therapy. He was given exercises to do at home. His wife states he also gets on their stationary bike 3 times a week. His wife notes he sits a lot during the day. She states he reads mystery books, has puzzle books that he works on, and plays on his phone. His wife states they are trying to stay active. His wife is working part time. He uses a treadmill twice a week.      He  "does have poor short term memory and he forgets his diagnosis often.     Memory changes noted and reviewed imaging previously with patient and wife on 2/24/2021.  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 3 year college education.     Of note the neurocognitive testing completed at the [facility] 6/30/2021 in summary showed findings consistent with a \"major neurocognitive disorder, mild severity due to early Alzheimer's disease with concomitant white matter changes of the brain along with mild behavioral disturbance\"-per Brittany Flores, PhD.   Recommendations were to manage vascular risk factors as well as discussed additional cognitive enhancers.     He has noted symptoms since at least 2019 marked initially by forgetfulness  and word-finding difficulties . This has gradually worsened  over time. Additional symptoms have included impairments in short term memory . There have been associated  symptoms of anxiety  and agitation . He does have mild impairments in ADL's. His family manages his medications and finances. He only drives to close stores now. He is currently residing at home with his wife. 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     He is forgetful about if he or what he has eaten when she gets home from work. He does drive her to and from work and does not get lost driving locally. He has completed some cognitive therapy visits with speech therapy at [facility], [place], operated by [facility]-insurance would not approve additional visits since there was not significant improvement noted.     He is also having some low back issues and has been evaluated with [facility] [place] operated by [facility] 4/2022. No surgical intervention recommended.       Prior evaluation has included a normal MRI of the brain 2019 showing minimal " generalized atrophy and 2 punctate chronic small vessel ischemic.     His daughter passed away 9/24/2023.    [facility] for concerns of an MRI of the brain showing a subacute stroke, completed outpatient for reports of dizziness. Our [facility] [place] inpatient stroke team did evaluate him in the ER. Due to his dementia, the family felt that it would be best for him to go home with additional outpatient work-up. They recommended he take aspirin 81 mg daily, Lipitor 40 mg daily, and follow up outpatient with the stroke team. His stroke follow-up was missed per his wife. Stroke team recommended completing outpatient stroke work-up with echocardiogram outpatient. His wife reports that he has continued general weakness and gait issues, agitation, anger triggers, and sleeping a lot more. His wife manages medications and finances and drives.      MRI of the brain without contrast on 5/6/2024 showed a punctate area of diffusion restriction in the posterior cerebral hemisphere, presumably an area of acute to subacute stroke, extensive white matter changes throughout.   5/7/2024-CTA of the neck at [facility] showed multifocal areas of severe stenosis throughout the left vertebral artery with complete nonocclusion of the left V4 segment, moderate to severe stenosis within the distal right cavernous segment ICA secondary to calcification and plaques. CTA of the head showed the same findings.    His wife expresses concern about his social interactions and apathy. He continues to have his baseline sense of humor.     He has impaired balance and has completed physical therapy in the past. He has carpal tunnel syndrome and is wearing wrist splints nightly. He does have hearing loss, which is significant and he wears hearing aids.     Known moderate to severe lumbar spinal stenosis diagnosed in 2018. Evaluated by Dr. Spencer Ricketts with [facility] [place] [facility]. Surgery was offered, however, due to his  memory loss, conservative therapy was chosen. Not interested in injections.    The following portions of the patient's history were reviewed and updated as appropriate: allergies, current medications, past family history, past medical history, past social history, past surgical history, and problem list.    Past Medical History:   Diagnosis Date   • Chest pain     Chest pain/dyspnea: Considering anginal equivalent and his risk factors, we will evaluate with a stress echocardiogram at his convenience and send a letter with the results and further recommendations.   • Difficulty walking 2022   • Dyslipidemia     2015:  Total cholesterol 186, triglycerides 147, HDL 37, .ASCVD 41.9%.   • First degree atrioventricular block     Chronicity unknown at this time, possibly first told 20 years ago.    • Head injury    • Hearing loss    • Hyperlipidemia    • Hypertension    • Major neurocognitive disorder due to Alzheimer's disease, with behavioral disturbance 2022   • Memory loss    • OA (osteoarthritis)    • Stroke        Past Surgical History:   Procedure Laterality Date   • HERNIA REPAIR     • OTHER SURGICAL HISTORY      Remote skull surgery.   • POLYPECTOMY         Social History     Socioeconomic History   • Marital status:    Tobacco Use   • Smoking status: Former     Current packs/day: 0.00     Average packs/day: 2.0 packs/day for 15.0 years (30.0 ttl pk-yrs)     Types: Cigarettes     Start date: 1981     Quit date: 1996     Years since quittin.9   • Smokeless tobacco: Never   • Tobacco comments:     3/11/2016:He quit smoking 20 years ago and was smoking 1 to 2 packs per day for 15-20 years before that.    Vaping Use   • Vaping status: Never Used   Substance and Sexual Activity   • Alcohol use: No   • Drug use: No   • Sexual activity: Not Currently     Partners: Female       Family History   Problem Relation Age of Onset   • Heart disease Mother    • Emphysema Mother    •  Dementia Father    • No Known Problems Sister    • No Known Problems Sister           Current Outpatient Medications:   •  aspirin 81 MG EC tablet, Take 1 tablet by mouth Daily., Disp: 90 tablet, Rfl: 3  •  atorvastatin (Lipitor) 40 MG tablet, Take 1 tablet by mouth Every Night., Disp: 90 tablet, Rfl: 3  •  cholecalciferol (VITAMIN D3) 25 MCG (1000 UT) tablet, Take 1 tablet by mouth Daily., Disp: , Rfl:   •  clonazePAM (KlonoPIN) 0.5 MG tablet, Give 1/2 to 1 tablet twice a day if needed for anxiety or panic symptoms. Use sparingly. Monitor for falls and sleepiness, Disp: 30 tablet, Rfl: 0  •  donepezil (ARICEPT) 10 MG tablet, Take 1 tablet by mouth Every Night., Disp: 90 tablet, Rfl: 3  •  finasteride (PROSCAR) 5 MG tablet, , Disp: , Rfl:   •  lisinopril (PRINIVIL,ZESTRIL) 5 MG tablet, Take 2.5 tablets by mouth Daily., Disp: , Rfl:   •  MAGNESIUM CITRATE PO, Take 500 mg by mouth Every Night., Disp: , Rfl:   •  memantine (NAMENDA) 10 MG tablet, Take 1 tablet by mouth 2 (Two) Times a Day., Disp: 180 tablet, Rfl: 3  •  Multiple Vitamins-Minerals (MULTIVITAMIN ADULT PO), Take  by mouth Daily., Disp: , Rfl:   •  Omega-3 Fatty Acids (fish oil) 1000 MG capsule capsule, Take  by mouth Daily With Breakfast., Disp: , Rfl:   •  pantoprazole (PROTONIX) 40 MG EC tablet, Take 1 tablet by mouth Daily., Disp: , Rfl:   •  sertraline (Zoloft) 50 MG tablet, Take 1 tablet by mouth Daily., Disp: 90 tablet, Rfl: 3  •  solifenacin (VESICARE) 5 MG tablet, , Disp: , Rfl:   •  tamsulosin (FLOMAX) 0.4 MG capsule 24 hr capsule, Take 1 capsule by mouth Daily., Disp: , Rfl:   •  vitamin B-12 (CYANOCOBALAMIN) 500 MCG tablet, Take 1 tablet by mouth 2 (two) times a day., Disp: , Rfl:      Review of Systems   Constitutional:  Positive for activity change.   Musculoskeletal:  Positive for arthralgias, back pain and gait problem.   Neurological:  Positive for weakness. Negative for dizziness, light-headedness, numbness and headaches.  "  Psychiatric/Behavioral:  Positive for agitation, confusion, decreased concentration and dysphoric mood. Negative for sleep disturbance. The patient is nervous/anxious.    All other systems reviewed and are negative.       Objective:  /62   Pulse 67   Ht 167.6 cm (66\")   Wt 83.7 kg (184 lb 9.6 oz)   SpO2 96%   BMI 29.80 kg/m²     Neurological Exam  Mental Status  Alert. Oriented only to person, place and time. At 5 minutes (0/5) Unable to copy figure. Clock drawing is normal. Speech is normal. Language is fluent with no aphasia. Unable to perform serial calculations. Fund of knowledge is abnormal. Apraxia absent. mild.    Cranial Nerves  CN II: Visual acuity is normal. Visual fields full to confrontation.  CN III, IV, VI: Extraocular movements intact bilaterally. Normal lids and orbits bilaterally. Pupils equal round and reactive to light bilaterally.  CN V: Facial sensation is normal.  CN VII: Full and symmetric facial movement.  CN VIII: Severely hard of hearing with bilateral hearing aids.  CN IX, X: Palate elevates symmetrically. Normal gag reflex.  CN XI: Shoulder shrug strength is normal.  CN XII: Tongue midline without atrophy or fasciculations.    Motor  Normal muscle bulk throughout. No fasciculations present. Normal muscle tone. The following abnormal movements were seen: BUE moderate kinetic hand tremors, no resting tremors.   Strength is 5/5 throughout all four extremities.    Reported weakness in legs with ambulation, no atrophy.    Sensory  Light touch is normal in upper and lower extremities. Pinprick is normal in upper and lower extremities. Temperature is normal in upper and lower extremities. Vibration abnormality: Decreased BLE.     Reflexes  Deep tendon reflexes are 2+ and symmetric except as noted.  Right Plantar: downgoing  Left Plantar: downgoing  Deep tendon reflexes: 3+ more hyperreflexia today noted in all 4 extremities.    Right pathological reflexes: Herman's absent. Ankle " clonus absent.  Left pathological reflexes: Herman's absent. Ankle clonus absent.    Coordination    Finger-to-nose, rapid alternating movements and heel-to-shin normal bilaterally without dysmetria.    Gait  Casual gait: Wide stance. Reduced stride length. Spastic gait. Favoring left side with antalgia also noted, wife reports this has been progressively worsening since stroke.Guarded.. Romberg is absent. Normal pull test. Able to rise from chair without using arms.    Physical Exam  Constitutional:       Appearance: Normal appearance.   Eyes:      General: Lids are normal.      Extraocular Movements: Extraocular movements intact.      Pupils: Pupils are equal, round, and reactive to light.   Neurological:      Mental Status: He is alert.      Motor: Motor strength is normal.     Coordination: Coordination is intact. Romberg sign negative.   Psychiatric:         Mood and Affect: Mood is anxious.         Speech: Speech normal.         Behavior: Behavior is slowed.         Cognition and Memory: He exhibits impaired recent memory.         Judgment: Judgment is impulsive and inappropriate.      Comments:   Jovial       Results:  Results  Imaging  Echocardiogram showed left ventricular ejection fraction of 56 to 60%, moderate calcification of the aortic valve, mild aortic valve regurgitation and moderate mitral annular calcification present.    Testing  [place] cognitive assessment score is a 22 out of 30, 0 out of 5 for word recall, continues to have significant amnestic recall.  Prior MOCA 20/30    Assessment/Plan:     Diagnoses and all orders for this visit:    1. Major neurocognitive disorder due to Alzheimer's disease, with behavioral disturbance (Primary)  -     sertraline (Zoloft) 50 MG tablet; Take 1 tablet by mouth Daily.  Dispense: 90 tablet; Refill: 3    2. 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 Lumbar stenosis without neurogenic  claudication  -     Ambulatory Referral to Physical Therapy for Evaluation & Treatment    4. Impaired functional mobility, balance, and endurance  -     Ambulatory Referral to Physical Therapy for Evaluation & Treatment    5. Weakness  -     CK; Future  -     Ambulatory Referral to Physical Therapy for Evaluation & Treatment    6. Abnormality of gait due to impairment of balance  -     Ambulatory Referral to Physical Therapy for Evaluation & Treatment    7. Stenosis of right carotid artery  Comments:  continue aspirin and statin therapy           Assessment & Plan  1. Alzheimer's Disease.  He has been exhibiting symptoms since 2019 and is currently on memantine and donepezil for cognitive enhancement. His [place] Cognitive Assessment score is 22 out of 30, indicating significant amnestic recall issues. His wife continues to manage finances, appointments, and driving. He will continue his current medications for cognitive enhancement. No refills needed today.    2. Cerebrovascular Accident.  He had a stroke in May 2024. An echocardiogram on 10/23/2024 showed a left ventricular ejection fraction of 56 to 60%, moderate calcification of the aortic valve, mild aortic valve regurgitation, and moderate mitral annular calcification. He is currently on aspirin 81 mg daily and atorvastatin 40 mg. He has not had any recent falls but reports increased weakness in his lower extremities. He will continue his current medications to prevent another stroke. A CK level test will be ordered to check for elevated muscle enzymes, which could indicate issues with atorvastatin. He will be referred to physical therapy for gait and balance training. If the CK level is abnormal, the statin will be reevaluated.    3. Lumbar Spinal Stenosis.  He has a history of moderate to severe lumbar spinal stenosis, which could be contributing to his leg weakness and balance issues. He has not undergone recent physical therapy recently. He will be  referred to physical therapy to work on strengthening his legs and improving balance. If he continues to have significant hip issues, an x-ray of his hip will be considered in consultation with Dr. Coley his PCP.    4. Carpal Tunnel Syndrome.  He reports occasional loss of feeling in his hands. He has been referred to a hand specialist for potential injections to address this issue.    5. Mood Disorder.  He is taking sertraline 50 mg daily, which has improved his mood. He has not required any clonazepam. He will continue his current medication regimen.    Follow-up  The patient will follow up in July 2025 or sooner if needed.    Reviewed medications, potential side effects and signs and symptoms to report. Discussed risk versus benefits of treatment plan with patient and/or family-including medications, labs and radiology that may be ordered. Addressed questions and concerns during visit. Patient and/or family verbalized understanding and agree with plan.    During this visit the following were done:  Labs Reviewed []    Labs Ordered []    Radiology Reports Reviewed [x]    Radiology Ordered []    PCP Records Reviewed []    Referring Provider Records Reviewed []    ER Records Reviewed []    Hospital Records Reviewed []    History Obtained From Family [x]    Radiology Images Reviewed []    Other Reviewed [x]    Records Requested []      11/26/24   16:15 EST    Patient or patient representative verbalized consent for the use of Ambient Listening during the visit with  TAMERA Hooper for chart documentation. 11/26/2024  16:57 EST    Note to patient: The 21st Century Cures Act makes medical notes like these available to patients in the interest of transparency. However, be advised this is a medical document. It is intended as peer to peer communication. It is written in medical language and may contain abbreviations or verbiage that are unfamiliar. It may appear blunt or direct. Medical documents are intended  to carry relevant information, facts as evident, and the clinical opinion of the provider.

## 2025-01-27 ENCOUNTER — TELEPHONE (OUTPATIENT)
Dept: NEUROLOGY | Facility: CLINIC | Age: 80
End: 2025-01-27
Payer: MEDICARE

## 2025-01-27 NOTE — TELEPHONE ENCOUNTER
Friend, Ace Ibrahim, came by office to notify our clinic that patient's wife Shwetha suddenly passed away from a brain bleed on Friday 1/24/2025 and the patient is home alone. We were unable to provide PHI to Ace as he is not on the [facility]. However, we provided him with emergency guardianship resources. Called and notified [facility]PS with Intake ID #[id number] of situation. Friend states he has checked on the patient. He is home alone. Daughter Candy Marie reportedly drinking alcohol and not present. Notified Kia Marie, manager. TAMERA Andre